# Patient Record
Sex: MALE | Race: WHITE | Employment: OTHER | URBAN - METROPOLITAN AREA
[De-identification: names, ages, dates, MRNs, and addresses within clinical notes are randomized per-mention and may not be internally consistent; named-entity substitution may affect disease eponyms.]

---

## 2017-05-02 ENCOUNTER — GENERIC CONVERSION - ENCOUNTER (OUTPATIENT)
Dept: OTHER | Facility: OTHER | Age: 67
End: 2017-05-02

## 2017-05-02 LAB
A/G RATIO (HISTORICAL): 2.1 (ref 1.2–2.2)
ALBUMIN SERPL BCP-MCNC: 4.4 G/DL (ref 3.6–4.8)
ALP SERPL-CCNC: 45 IU/L (ref 39–117)
ALT SERPL W P-5'-P-CCNC: 22 IU/L (ref 0–44)
AST SERPL W P-5'-P-CCNC: 24 IU/L (ref 0–40)
BASOPHILS # BLD AUTO: 0.1 X10E3/UL (ref 0–0.2)
BASOPHILS # BLD AUTO: 1 %
BILIRUB SERPL-MCNC: 0.8 MG/DL (ref 0–1.2)
BUN SERPL-MCNC: 18 MG/DL (ref 8–27)
BUN/CREA RATIO (HISTORICAL): 16 (ref 10–24)
CALCIUM SERPL-MCNC: 9.2 MG/DL (ref 8.6–10.2)
CHLORIDE SERPL-SCNC: 100 MMOL/L (ref 96–106)
CHOLEST SERPL-MCNC: 153 MG/DL (ref 100–199)
CO2 SERPL-SCNC: 22 MMOL/L (ref 18–29)
CREAT SERPL-MCNC: 1.11 MG/DL (ref 0.76–1.27)
DEPRECATED RDW RBC AUTO: 14.1 % (ref 12.3–15.4)
EGFR AFRICAN AMERICAN (HISTORICAL): 79 ML/MIN/1.73
EGFR-AMERICAN CALC (HISTORICAL): 68 ML/MIN/1.73
EOSINOPHIL # BLD AUTO: 0.8 X10E3/UL (ref 0–0.4)
EOSINOPHIL # BLD AUTO: 13 %
GLUCOSE SERPL-MCNC: 108 MG/DL (ref 65–99)
HBA1C MFR BLD HPLC: 6.1 % (ref 4.8–5.6)
HCT VFR BLD AUTO: 40.4 % (ref 37.5–51)
HDLC SERPL-MCNC: 53 MG/DL
HGB BLD-MCNC: 14.2 G/DL (ref 12.6–17.7)
IMM.GRANULOCYTES (CD4/8) (HISTORICAL): 0 %
IMM.GRANULOCYTES (CD4/8) (HISTORICAL): 0 X10E3/UL (ref 0–0.1)
LDLC SERPL CALC-MCNC: 78 MG/DL (ref 0–99)
LYMPHOCYTES # BLD AUTO: 2.1 X10E3/UL (ref 0.7–3.1)
LYMPHOCYTES # BLD AUTO: 33 %
MCH RBC QN AUTO: 29.1 PG (ref 26.6–33)
MCHC RBC AUTO-ENTMCNC: 35.1 G/DL (ref 31.5–35.7)
MCV RBC AUTO: 83 FL (ref 79–97)
MONOCYTES # BLD AUTO: 0.5 X10E3/UL (ref 0.1–0.9)
MONOCYTES (HISTORICAL): 8 %
NEUTROPHILS # BLD AUTO: 2.9 X10E3/UL (ref 1.4–7)
NEUTROPHILS # BLD AUTO: 45 %
PLATELET # BLD AUTO: 231 X10E3/UL (ref 150–379)
POTASSIUM SERPL-SCNC: 4.3 MMOL/L (ref 3.5–5.2)
PROSTATE SPECIFIC ANTIGEN (HISTORICAL): 1 NG/ML (ref 0–4)
RBC (HISTORICAL): 4.88 X10E6/UL (ref 4.14–5.8)
SODIUM SERPL-SCNC: 139 MMOL/L (ref 134–144)
TOT. GLOBULIN, SERUM (HISTORICAL): 2.1 G/DL (ref 1.5–4.5)
TOTAL PROTEIN (HISTORICAL): 6.5 G/DL (ref 6–8.5)
TRIGL SERPL-MCNC: 111 MG/DL (ref 0–149)
WBC # BLD AUTO: 6.3 X10E3/UL (ref 3.4–10.8)

## 2017-05-03 LAB — INTERPRETATION (HISTORICAL): NORMAL

## 2017-05-10 ENCOUNTER — ALLSCRIPTS OFFICE VISIT (OUTPATIENT)
Dept: OTHER | Facility: OTHER | Age: 67
End: 2017-05-10

## 2017-05-15 ENCOUNTER — GENERIC CONVERSION - ENCOUNTER (OUTPATIENT)
Dept: OTHER | Facility: OTHER | Age: 67
End: 2017-05-15

## 2017-11-13 ENCOUNTER — GENERIC CONVERSION - ENCOUNTER (OUTPATIENT)
Dept: OTHER | Facility: OTHER | Age: 67
End: 2017-11-13

## 2017-11-13 LAB
A/G RATIO (HISTORICAL): 2.1 (ref 1.2–2.2)
ALBUMIN SERPL BCP-MCNC: 4.2 G/DL (ref 3.6–4.8)
ALP SERPL-CCNC: 40 IU/L (ref 39–117)
ALT SERPL W P-5'-P-CCNC: 23 IU/L (ref 0–44)
AST SERPL W P-5'-P-CCNC: 21 IU/L (ref 0–40)
BILIRUB SERPL-MCNC: 0.6 MG/DL (ref 0–1.2)
BUN SERPL-MCNC: 15 MG/DL (ref 8–27)
BUN/CREA RATIO (HISTORICAL): 13 (ref 10–24)
CALCIUM SERPL-MCNC: 8.9 MG/DL (ref 8.6–10.2)
CHLORIDE SERPL-SCNC: 99 MMOL/L (ref 96–106)
CO2 SERPL-SCNC: 25 MMOL/L (ref 18–29)
CREAT SERPL-MCNC: 1.14 MG/DL (ref 0.76–1.27)
EGFR AFRICAN AMERICAN (HISTORICAL): 77 ML/MIN/1.73
EGFR-AMERICAN CALC (HISTORICAL): 66 ML/MIN/1.73
GLUCOSE SERPL-MCNC: 107 MG/DL (ref 65–99)
HBA1C MFR BLD HPLC: 6.1 % (ref 4.8–5.6)
POTASSIUM SERPL-SCNC: 3.9 MMOL/L (ref 3.5–5.2)
SODIUM SERPL-SCNC: 140 MMOL/L (ref 134–144)
TOT. GLOBULIN, SERUM (HISTORICAL): 2 G/DL (ref 1.5–4.5)
TOTAL PROTEIN (HISTORICAL): 6.2 G/DL (ref 6–8.5)

## 2017-11-15 ENCOUNTER — GENERIC CONVERSION - ENCOUNTER (OUTPATIENT)
Dept: OTHER | Facility: OTHER | Age: 67
End: 2017-11-15

## 2017-11-20 ENCOUNTER — ALLSCRIPTS OFFICE VISIT (OUTPATIENT)
Dept: OTHER | Facility: OTHER | Age: 67
End: 2017-11-20

## 2017-11-21 NOTE — PROGRESS NOTES
Assessment    1  Benign essential hypertension (401 1) (I10)   2  CAD (coronary atherosclerotic disease) (414 00) (I25 10)   3  Hyperlipidemia LDL goal <100 (272 4) (E78 5)   4  IFG (impaired fasting glucose) (790 21) (R73 01)    Plan  Benign essential hypertension    · AmLODIPine Besylate 5 MG Oral Tablet; Take 1 tablet daily   · Metoprolol Tartrate 50 MG Oral Tablet; 1 two times a day  Benign essential hypertension, CAD (coronary atherosclerotic disease), HyperlipidemiaLDL goal <100, IFG (impaired fasting glucose)    · (1) COMPREHENSIVE METABOLIC PANEL; Status:Active; Requested for:20Nov2017;    · (1) HEMOGLOBIN A1C; Status:Active; Requested for:20Nov2017;   Benign essential hypertension, CAD (coronary atherosclerotic disease), HyperlipidemiaLDL goal <100, IFG (impaired fasting glucose), Prostate cancerscreening    · (1) PSA (SCREEN) (Dx V76 44 Screen for Prostate Cancer); Status:Active; Requestedfor:20Nov2017;   CAD (coronary atherosclerotic disease)    · Aspir-81 81 MG Oral Tablet Delayed Release; 1 every day   · Clopidogrel Bisulfate 75 MG Oral Tablet (Plavix); 1 every day   · Nitrostat 0 4 MG Sublingual Tablet Sublingual (Nitroglycerin); PLACE 1 TABLETUNDER THE TONGUE EVERY 5 MINUTES UP TO 3 DOSES AS NEEDEDFOR CHEST PAIN  Hyperlipidemia LDL goal <100    · Crestor 20 MG Oral Tablet (Rosuvastatin Calcium); ONE TAB DAILY  Screening for genitourinary condition    · *VB - Urinary Incontinence Screen (Dx Z13 89 Screen for UI); Status:Resulted - RequiresVerification,Retrospective By Protocol Authorization;   Done: 60GNO2825 12:00AM  Unlinked    · Doxycycline Hyclate 20 MG Oral Tablet    Discussion/Summary  Possible side effects of new medications were reviewed with the patient/guardian today  The treatment plan was reviewed with the patient/guardian  The patient/guardian understands and agrees with the treatment plan      Chief Complaint  Seen for a f/u on labs  er/cma        History of Present Illness  prediabetesand prevention advisedadvisedloss is recommended  The patient states his hyperlipidemia has been stable since the last visit  Comorbid Illnesses: coronary artery disease  Symptoms: denies muscle pain  The patient presents for follow-up of essential hypertension  The patient states he has been doing well with his blood pressure control since the last visit  Comorbid Illnesses: coronary artery disease  Symptoms: denies lower extremity edema  Review of Systems   Cardiovascular: no chest pain  Neurological: no dizziness  Preventive Quality 65 and Older: The patient currently has no urinary incontinence symptoms  Active Problems    1  Benign colon polyp (211 3) (K63 5)   2  Benign essential hypertension (401 1) (I10)   3  BMI 26 0-26 9,adult (V85 22) (Z68 26)   4  CAD (coronary atherosclerotic disease) (414 00) (I25 10)   5  Colon cancer screening (V76 51) (Z12 11)   6  Hemorrhoids (455 6) (K64 9)   7  Hyperlipidemia LDL goal <100 (272 4) (E78 5)   8  IFG (impaired fasting glucose) (790 21) (R73 01)   9  Immunization due (V05 9) (Z23)   10  Organic impotence (607 84) (N52 9)   11  Overweight (278 02) (E66 3)   12  Prostate cancer screening (V76 44) (Z12 5)   13  Screening for cardiovascular, respiratory, and genitourinary diseases  (V81 2,V81 4,V81 6) (Z13 6,Z13 83,Z13 89)   14  Screening for diabetes mellitus (DM) (V77 1) (Z13 1)    Past Medical History  1  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)   2  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)   3  History of chest pain (V13 89) (Z87 898)   4  History of dermatitis (V13 3) (Z87 2)   5  History of diarrhea (V12 79) (Z87 898)   6  History of Myalgia And Myositis (729 1)   7  History of Noninfectious Dermatological Conditions (709 9)   8  History of Skin neoplasm (239 2) (D49 2)    Family History  Father    1   Family history of Coronary arteriosclerosis    The family history was reviewed and updated today  Social History     · Former smoker (C35 25) (X85 108)  The social history was reviewed and updated today  Current Meds   1  AmLODIPine Besylate 5 MG Oral Tablet; Take 1 tablet daily; Therapy: (Recorded:20Nov2017) to Recorded   2  Aspir-81 81 MG Oral Tablet Delayed Release; 1 every day; Therapy: 90OXR0824 to (Last Rx:10Jun2013)  Requested for: 17QYL7878 Ordered   3  Cialis 20 MG Oral Tablet; use as directed; Therapy: 42OSY3237 to (Last Richard Lias)  Requested for: 28Oct2016 Ordered   4  Clopidogrel Bisulfate 75 MG Oral Tablet; 1 every day; Therapy: 82RUS0253 to (Last Rx:10Jun2013)  Requested for: 14GWT0893 Ordered   5  Crestor 20 MG Oral Tablet; ONE TAB DAILY; Therapy: 47DRV2865 to (Last Rx:10Jun2013)  Requested for: 67EKJ4461 Ordered   6  Doxycycline Hyclate 20 MG Oral Tablet; take 1 tablet twice daily Recorded   7  Metoprolol Tartrate 50 MG Oral Tablet; 1 two times a day; Therapy: 13IDW0664 to (Last Rx:10Jun2013)  Requested for: 28GER9059 Ordered   8  Nitrostat 0 4 MG Sublingual Tablet Sublingual; PLACE 1 TABLET UNDER THE TONGUE EVERY 5 MINUTES UP TO 3 DOSES AS NEEDED FOR CHEST PAIN; Therapy: 55PLJ6161 to (Evaluate:23Oct2013); Last Rx:77Ens7095 Ordered    Allergies  1  No Known Drug Allergies    Vitals  Vital Signs    Recorded: 20Nov2017 10:00AM   Temperature 96 3 F   Heart Rate 76   Respiration 20   Systolic 402   Diastolic 80   Height 5 ft 10 in   Weight 178 lb    BMI Calculated 25 54   BSA Calculated 1 99       Physical Exam   Constitutional  General appearance: No acute distress, well appearing and well nourished  Eyes  Conjunctiva and lids: No swelling, erythema, or discharge  Pupils and irises: Equal, round and reactive to light  Ears, Nose, Mouth, and Throat  External inspection of ears and nose: Normal    Otoscopic examination: Tympanic membrance translucent with normal light reflex  Canals patent without erythema     Nasal mucosa, septum, and turbinates: Normal without edema or erythema  Oropharynx: Normal with no erythema, edema, exudate or lesions  Pulmonary  Respiratory effort: No increased work of breathing or signs of respiratory distress  Auscultation of lungs: Clear to auscultation, equal breath sounds bilaterally, no wheezes, no rales, no rhonci  Cardiovascular  Auscultation of heart: Normal rate and rhythm, normal S1 and S2, without murmurs  Examination of extremities for edema and/or varicosities: Normal    Carotid pulses: Normal    Abdomen  Abdomen: Non-tender, no masses  Lymphatic  Palpation of lymph nodes in neck: No lymphadenopathy  Musculoskeletal  Gait and station: Normal    Digits and nails: Normal without clubbing or cyanosis  Skin  Skin and subcutaneous tissue: Normal without rashes or lesions  Psychiatric  Mood and affect: Normal          Results/Data  *VB - Urinary Incontinence Screen (Dx Z13 89 Screen for UI) 07IJA2067 12:00AM Julisa Bismarck     Test Name Result Flag Reference   Urinary Incontinence Assessment 72NLK1510                       Provider Comments    htn/cad/chol stableunchangedf/uof statins for purposes of CVD/CVA risk reduction was advised according to USPSTF guidelines, along with appropriate continued liver monitoring        Signatures   Electronically signed by : Jaiden Pink DO; Nov 20 2017 12:49PM EST                       (Author)

## 2018-01-11 NOTE — PROGRESS NOTES
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)   6  Prediabetes (790 29) (R73 09)    Plan  Benign essential hypertension, CAD (coronary atherosclerotic disease), Hyperlipidemia  LDL goal <100, Prediabetes    · (LC) CMP14+eGFR; Status:Active; Requested QUINN:30JLH2595;    · (LC) Hemoglobin A1c; Status:Active; Requested KWK:91WZU7266;    · ([facility]) Lipid Panel; Status:Active; Requested KRYSTAL:50PGD8714;   Hyperlipidemia LDL goal <100    · Begin a limited exercise program ; Status:Complete;   Done: 05IZL9402   · Eat a low fat and low cholesterol diet ; Status:Complete;   Done: 48GFZ6630   · Shared Decision Making Aid given; Status:Complete;   Done: 64OTC0812    Discussion/Summary    ALVIN  htn/cad/chol/ed/prediabetes stable  q6m f/u  The patient, patient's family was counseled regarding risk factor reductions, impressions, risks and benefits of treatment options  Chief Complaint  pt present to follow up on medications  hg      History of Present Illness  CAD/htn/chol stable  ED stable, not very active  sees cardiologist  Dr Diego Aggarwal  prediabetes unchanged  chol stable  psa normal      Review of Systems    Constitutional: no fever and no chills  Cardiovascular: no chest pain  Respiratory: no shortness of breath  Neurological: no dizziness and no fainting  Preventive Quality 65 and Older: The patient currently has no urinary incontinence symptoms  Active Problems    1  Benign colon polyp (211 3) (K63 5)   2  Benign essential hypertension (401 1) (I10)   3  CAD (coronary atherosclerotic disease) (414 00) (I25 10)   4  Colon cancer screening (V76 51) (Z12 11)   5  Dermatitis (692 9) (L30 9)   6  Hemorrhoids (455 6) (K64 9)   7   Hyperlipidemia LDL goal <100 (272 4) (E78 5)   8  Immunization due (V05 9) (Z23)   9  Myalgia And Myositis (729 1)   10  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)   11  Prediabetes (790 29) (R73 09)   12  Prostate cancer screening (V76 44) (Z12 5)   13  Screening for diabetes mellitus (DM) (V77 1) (Z13 1)   14  Skin neoplasm (239 2) (D49 2)    Past Medical History    1  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)   2  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)   3  History of chest pain (V13 89) (Z87 898)   4  History of diarrhea (V12 79) (Z87 898)   5  History of Noninfectious Dermatological Conditions (709 9)    Family History    1  Family history of Coronary arteriosclerosis    The family history was reviewed and updated today  Social History    · Former smoker (N04 50) (W13 358)  The social history was reviewed and is unchanged  Current Meds   1  AmLODIPine Besylate 5 MG Oral Tablet; Take 1 tablet daily; Therapy: (Blanquita Ball) to Recorded   2  Aspir-81 81 MG Oral Tablet Delayed Release; 1 every day; Therapy: 02GQY7696 to (Last Rx:10Jun2013)  Requested for: 13VPT6105 Ordered   3  Cialis 20 MG Oral Tablet; use as directed; Therapy: 14BMH0851 to (Last Rx:92Kqu5873)  Requested for: 83Svo3707 Ordered   4  Clopidogrel Bisulfate 75 MG Oral Tablet; 1 every day; Therapy: 18LCJ0661 to (Last Rx:10Jun2013)  Requested for: 03Obi4788 Ordered   5  Crestor 20 MG Oral Tablet; ONE TAB DAILY; Therapy: 11NWP5308 to (Last Rx:10Jun2013)  Requested for: 88WFE5242 Ordered   6  Doxycycline Hyclate 20 MG Oral Tablet; take 1 tablet twice daily Recorded   7  Metoprolol Tartrate 50 MG Oral Tablet; 1 two times a day; Therapy: 99ZFZ9800 to (Last Rx:10Jun2013)  Requested for: 06Nrt7613 Ordered   8  Nitrostat 0 4 MG Sublingual Tablet Sublingual; PLACE 1 TABLET UNDER THE TONGUE   EVERY 5 MINUTES UP TO 3 DOSES AS NEEDED FOR CHEST PAIN;   Therapy: 89SYY4271 to (Evaluate:23Oct2013);  Last Rx:36Ksk8711 Ordered    Allergies    1  No Known Drug Allergies    Vitals  Vital Signs [Data Includes: Current Encounter]    Recorded: 16ASR5668 02:10PM   Temperature 98 4 F   Heart Rate 68   Respiration 18   Systolic 152   Diastolic 70   Height 5 ft 10 in   Weight 182 lb    BMI Calculated 26 11   BSA Calculated 2 01     Physical Exam    Constitutional   General appearance: No acute distress, well appearing and well nourished  Eyes   Conjunctiva and lids: No swelling, erythema, or discharge  Pupils and irises: Equal, round and reactive to light  Ears, Nose, Mouth, and Throat   External inspection of ears and nose: Normal     Otoscopic examination: Tympanic membrance translucent with normal light reflex  Canals patent without erythema  Nasal mucosa, septum, and turbinates: Normal without edema or erythema  Oropharynx: Normal with no erythema, edema, exudate or lesions  Pulmonary   Respiratory effort: No increased work of breathing or signs of respiratory distress  Auscultation of lungs: Clear to auscultation, equal breath sounds bilaterally, no wheezes, no rales, no rhonci  Cardiovascular   Palpation of heart: Normal PMI, no thrills  Auscultation of heart: Normal rate and rhythm, normal S1 and S2, without murmurs  Examination of extremities for edema and/or varicosities: Normal     Abdomen   Abdomen: Non-tender, no masses  Lymphatic   Palpation of lymph nodes in neck: No lymphadenopathy  Musculoskeletal   Gait and station: Normal     Digits and nails: Normal without clubbing or cyanosis  Inspection/palpation of joints, bones, and muscles: Normal     Skin   Skin and subcutaneous tissue: Normal without rashes or lesions      Psychiatric   Mood and affect: Normal          Signatures   Electronically signed by : Stefano Boykin DO; Jan 27 2016 10:46PM EST                       (Author)

## 2018-01-11 NOTE — RESULT NOTES
Verified Results  (1) CBC/PLT/DIFF 95XHY8121 07:38AM Juluis Hair     Test Name Result Flag Reference   WBC 6 3 x10E3/uL  3 4-10 8   RBC 4 88 x10E6/uL  4 14-5 80   Hemoglobin 14 2 g/dL  12 6-17 7   Hematocrit 40 4 %  37 5-51 0   MCV 83 fL  79-97   MCH 29 1 pg  26 6-33 0   MCHC 35 1 g/dL  31 5-35 7   RDW 14 1 %  12 3-15 4   Platelets 785 E23L6/ZJ  150-379   Neutrophils 45 %     Lymphs 33 %     Monocytes 8 %     Eos 13 %     Basos 1 %     Neutrophils (Absolute) 2 9 x10E3/uL  1 4-7 0   Lymphs (Absolute) 2 1 x10E3/uL  0 7-3 1   Monocytes(Absolute) 0 5 x10E3/uL  0 1-0 9   Eos (Absolute) 0 8 x10E3/uL H 0 0-0 4   Baso (Absolute) 0 1 x10E3/uL  0 0-0 2   Immature Granulocytes 0 %     Immature Grans (Abs) 0 0 x10E3/uL  0 0-0 1

## 2018-01-12 VITALS
HEIGHT: 70 IN | SYSTOLIC BLOOD PRESSURE: 130 MMHG | DIASTOLIC BLOOD PRESSURE: 80 MMHG | BODY MASS INDEX: 25.48 KG/M2 | WEIGHT: 178 LBS | TEMPERATURE: 96.3 F | HEART RATE: 76 BPM | RESPIRATION RATE: 20 BRPM

## 2018-01-12 NOTE — PROGRESS NOTES
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)   6  Prediabetes (790 29) (R73 09)    Plan  Benign essential hypertension, CAD (coronary atherosclerotic disease), Hyperlipidemia  LDL goal <100, Prediabetes    · (LC) CMP14+eGFR; Status:Active; Requested YOK:95NFW6003;    · (LC) Hemoglobin A1c; Status:Active; Requested RNB:16YXZ6695;    · ([facility]) Lipid Panel; Status:Active; Requested [id number];   Hyperlipidemia LDL goal <100    · Begin a limited exercise program ; Status:Complete;   Done: 73YRQ8803   · Eat a low fat and low cholesterol diet ; Status:Complete;   Done: 63UCT6047   · Shared Decision Making Aid given; Status:Complete;   Done: 05BBW1804    Discussion/Summary  Impression: Initial Annual Wellness Visit, with preventive exam as well as age and risk appropriate counseling completed, given  Cardiovascular screening and counseling: the risks and benefits of screening were discussed and screening is current  Diabetes screening and counseling: the risks and benefits of screening were discussed and screening is current  Prostate cancer screening and counseling: the risks and benefits of screening were discussed and screening is current  Immunizations: the patient declines the influenza vaccination and the patient declines the pneumococcal vaccination  Advance Directive Planning: paperwork and instructions were given to the patient  Patient Discussion: plan discussed with the patient, plan discussed with the patient's family  History of Present Illness  The patient is being seen for the initial annual wellness visit  Medicare Screening and Risk Factors   Hospitalizations: he has been previously hospitalizied and cardiac stent     Medicare Screening Tests Risk Questions   Abdominal aortic aneurysm risk assessment: none indicated  Osteoporosis risk assessment: none indicated  HIV risk assessment: none indicated  Drug and Alcohol Use: The patient is a former cigarette smoker and quit 1984  The patient reports occasional alcohol use  He has never used illicit drugs  Diet and Physical Activity: Current diet includes low fat food choices and low salt food choices  He walks dog  The patient does not exercise  Mood Disorder and Cognitive Impairment Screening: PHQ-9 Depression Scale   Over the past 2 weeks, how often have you been bothered by the following problems? 1 ) Little interest or pleasure in doing things? Not at all    2 ) Feeling down, depressed or hopeless? Not at all  He denies feeling down, depressed, or hopeless over the past two weeks  He denies feeling little interest or pleasure in doing things over the past two weeks  Functional Ability/Level of Safety: Hearing is a hearing aid is not used  He denies hearing difficulties  The patient is currently able to do activities of daily living without limitations and able to do instrumental activities of daily living without limitations  Activities of daily living details: no meal preparation help needed, does not need help doing housework, does not need help managing medications and does not need help managing money  Advance Directives: Advance directives: no living will  Co-Managers and Medical Equipment/Suppliers: See Patient Care Team      Patient Care Team    Care Team Member Role Specialty Office Number   Carson Toledo, [address] Referring Family Medicine [phone]   Enzo Perez MD Specialist Cardiology [phone]    Specialist  [phone]    Specialist  [phone]     Active Problems    1  Benign colon polyp (211 3) (K63 5)   2  Benign essential hypertension (401 1) (I10)   3  CAD (coronary atherosclerotic disease) (414 00) (I25 10)   4   Colon cancer screening (V74 51) (Z12 11)   5  Dermatitis (692 9) (L30 9)   6  Hemorrhoids (455 6) (K64 9)   7  Hyperlipidemia LDL goal <100 (272 4) (E78 5)   8  Immunization due (V05 9) (Z23)   9  Myalgia And Myositis (729 1)   10  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)   11  Prediabetes (790 29) (R73 09)   12  Prostate cancer screening (V76 44) (Z12 5)   13  Screening for diabetes mellitus (DM) (V77 1) (Z13 1)   14  Screening for genitourinary condition (V81 6) (Z13 89)   15  Skin neoplasm (239 2) (D49 2)    Past Medical History    1  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)   2  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)   3  History of chest pain (V13 89) (Z87 898)   4  History of diarrhea (V12 79) (Z87 898)   5  History of Noninfectious Dermatological Conditions (709 9)    Family History    1  Family history of Coronary arteriosclerosis    Social History    · Former smoker (H12 86) (T72 970)    Current Meds   1  AmLODIPine Besylate 5 MG Oral Tablet; Take 1 tablet daily; Therapy: (Javier Chin to Recorded   2  Aspir-81 81 MG Oral Tablet Delayed Release; 1 every day; Therapy: 88DSX5936 to (Last Rx:10Jun2013)  Requested for: 94EVT4941 Ordered   3  Cialis 20 MG Oral Tablet; use as directed; Therapy: 58CRO2440 to (Last Rx:53Qtl9941)  Requested for: 84Wrl4428 Ordered   4  Clopidogrel Bisulfate 75 MG Oral Tablet (Plavix); 1 every day; Therapy: 84KBF7132 to (Last Rx:10Jun2013)  Requested for: 92Pfn0599 Ordered   5  Crestor 20 MG Oral Tablet; ONE TAB DAILY; Therapy: 14ZOR8925 to (Last Rx:10Jun2013)  Requested for: 95HOO4858 Ordered   6  Doxycycline Hyclate 20 MG Oral Tablet; take 1 tablet twice daily Recorded   7  Metoprolol Tartrate 50 MG Oral Tablet; 1 two times a day; Therapy: 35SBR2725 to (Last Rx:48Ywd5241)  Requested for: 91Zen4385 Ordered   8   Nitrostat 0 4 MG Sublingual Tablet Sublingual; PLACE 1 TABLET UNDER THE TONGUE   EVERY 5 MINUTES UP TO 3 DOSES AS NEEDED FOR CHEST PAIN;   Therapy: 45GOI4581 to (Evaluate:23Oct2013); Last Rx:02Wsx3759 Ordered    Allergies    1   No Known Drug Allergies    Vitals  Signs [Data Includes: Current Encounter]   Recorded: 91DRS5815 02:10PM   Temperature: 98 4 F  Heart Rate: 68  Respiration: 18  Systolic: 214  Diastolic: 70  Height: 5 ft 10 in  Weight: 182 lb   BMI Calculated: 26 11  BSA Calculated: 2 01    Results/Data  Encounter Results   *VB-Urinary Incontinence Screen (Dx V81 6 Screen for UI) 28AHZ0814 10:46PM Calin Hem     Test Name Result Flag Reference   Urinary Incontinence Assessment 47VQC6123       DIGITAL RECTAL EXAM 57UEF9363 10:44PM Calin Hem     Test Name Result Flag Reference   DIGITAL RECTAL EXAM [id number]         Signatures   Electronically signed by : Kady Valiente DO; Jan 27 2016 10:51PM EST                       (Author)

## 2018-01-14 VITALS
TEMPERATURE: 97.4 F | SYSTOLIC BLOOD PRESSURE: 122 MMHG | BODY MASS INDEX: 25.34 KG/M2 | HEIGHT: 70 IN | WEIGHT: 177 LBS | RESPIRATION RATE: 16 BRPM | DIASTOLIC BLOOD PRESSURE: 82 MMHG | HEART RATE: 60 BPM

## 2018-01-15 NOTE — RESULT NOTES
Verified Results  ([facility] [facility]) Comp  Metabolic Panel (13) 15DDR6182 09:56AM Phyliss Severs     Test Name Result Flag Reference   Glucose, Serum 107 mg/dL H 65-99   BUN 13 mg/dL  8-27   Creatinine, Serum 1 09 mg/dL  0 76-1 27   eGFR If NonAfricn Am 71 mL/min/1 73  >59   eGFR If Africn Am 82 mL/min/1 73  >59   BUN/Creatinine Ratio 12  10-22   Sodium, Serum 139 mmol/L  134-144   Potassium, Serum 4 6 mmol/L  3 5-5 2   Chloride, Serum 99 mmol/L     Carbon Dioxide, Total 23 mmol/L  18-29   Calcium, Serum 9 2 mg/dL  8 6-10 2   Protein, Total, Serum 6 7 g/dL  6 0-8 5   Albumin, Serum 4 7 g/dL  3 6-4 8   Globulin, Total 2 0 g/dL  1 5-4 5   A/G Ratio 2 4  1 1-2 5   Bilirubin, Total 0 7 mg/dL  0 0-1 2   Alkaline Phosphatase, S 49 IU/L     AST (SGOT) 19 IU/L  0-40     (LC) Lipid Panel 70SYS6912 09:56AM Phyliss Severs     Test Name Result Flag Reference   Cholesterol, Total 192 mg/dL  100-199   Triglycerides 165 mg/dL H 0-149   HDL Cholesterol 63 mg/dL  >39   According to ATP-III Guidelines, HDL-C >59 mg/dL is considered a  negative risk factor for CHD  VLDL Cholesterol Jeremias 33 mg/dL  5-40   LDL Cholesterol Calc 96 mg/dL  0-99     (1) PSA (SCREEN) (Dx V76 44 Screen for Prostate Cancer) 13DMB8043 09:56AM Phyliss Severs     Test Name Result Flag Reference   Prostate Specific Ag, Serum 1 1 ng/mL  0 0-4 0   Roche ECLIA methodology  According to the American Urological Association, Serum PSA should  decrease and remain at undetectable levels after radical  prostatectomy  The AUA defines biochemical recurrence as an initial  PSA value 0 2 ng/mL or greater followed by a subsequent confirmatory  PSA value 0 2 ng/mL or greater  Values obtained with different assay methods or kits cannot be used  interchangeably  Results cannot be interpreted as absolute evidence  of the presence or absence of malignant disease       [facility]) Cardiovascular Risk Assessment 81QIT1640 09:56AM Phyliss Severs     Test Name Result Flag Reference Interpretation Note     Supplement report is available  PDF Image   ([facility]) Hemoglobin A1c 33IHY7176 09:56AM Reji Razo courtesy copy of this report has been sent to  [facility] Specialist      Test Name Result Flag Reference   Hemoglobin A1c 6 4 %Hb     Reference Range:  American Diabetes Association (ADA) Guidelines:  <5 7: Decreased risk for diabetes  5 7 - 6 4: Increased risk for diabetes  >6 4: Ongoing Hyperglycemia of any cause  <7 0: Glycemic control for adults with diabetes   Estimated Average Glucose 137 mg/dL         Discussion/Summary   Please schedule an office appointment to discuss your test results    Dr Carson Toledo

## 2018-01-16 NOTE — RESULT NOTES
Verified Results  ([address] [facility]) Hemoglobin A1c 82Jgq7344 10:48AM Juan Vizcarra     Test Name Result Flag Reference   Hemoglobin A1c 6 3 %Hb     Reference Range:  American Diabetes Association (ADA) Guidelines:  <5 7: Decreased risk for diabetes  5 7 - 6 4: Increased risk for diabetes  >6 4: Ongoing Hyperglycemia of any cause  <7 0: Glycemic control for adults with diabetes   Estimated Average Glucose 134 mg/dL         Discussion/Summary   A1c level indicates risk for developing Diabetes    Dr Jessica Proctor

## 2018-01-16 NOTE — RESULT NOTES
Verified Results  [facility]) CMP14+eGFR [id number] 10:48AM [facility] [place]     Test Name Result Flag Reference   Glucose, Serum 113 mg/dL H 65-99   BUN 12 mg/dL  8-27   Creatinine, Serum 1 02 mg/dL  0 76-1 27   eGFR If NonAfricn Am 76 mL/min/1 73  >59   eGFR If Africn Am 88 mL/min/1 73  >59   BUN/Creatinine Ratio 12  10-22   Sodium, Serum 142 mmol/L  134-144   Potassium, Serum 4 9 mmol/L  3 5-5 2   Chloride, Serum 99 mmol/L     Carbon Dioxide, Total 26 mmol/L  18-29   Calcium, Serum 9 6 mg/dL  8 6-10 2   Protein, Total, Serum 7 0 g/dL  6 0-8 5   Albumin, Serum 4 9 g/dL H 3 6-4 8   Globulin, Total 2 1 g/dL  1 5-4 5   A/G Ratio 2 3  1 1-2 5   Bilirubin, Total 0 5 mg/dL  0 0-1 2   Alkaline Phosphatase, S 53 IU/L     AST (SGOT) 25 IU/L  0-40   ALT (SGPT) 28 IU/L  0-44     (LC) Lipid Panel [id number] 10:48AM [facility] [place]     Test Name Result Flag Reference   Cholesterol, Total 183 mg/dL  100-199   Triglycerides 182 mg/dL H 0-149   HDL Cholesterol 63 mg/dL  >39   According to ATP-III Guidelines, HDL-C >59 mg/dL is considered a  negative risk factor for CHD  VLDL Cholesterol Jeremias 36 mg/dL  5-40   LDL Cholesterol Calc 84 mg/dL  0-99       Discussion/Summary   Please schedule an office appointment    Dr Swann Render

## 2018-01-17 NOTE — RESULT NOTES
Verified Results  (1) COMPREHENSIVE METABOLIC PANEL [id number] 89:57NS [place]     Test Name Result Flag Reference   Glucose, Serum 108 mg/dL H 65-99   BUN 18 mg/dL  8-27   Creatinine, Serum 1 11 mg/dL  0 76-1 27   BUN/Creatinine Ratio 16  10-24   Sodium, Serum 139 mmol/L  134-144   Potassium, Serum 4 3 mmol/L  3 5-5 2   Chloride, Serum 100 mmol/L     Carbon Dioxide, Total 22 mmol/L  18-29   Calcium, Serum 9 2 mg/dL  8 6-10 2   Protein, Total, Serum 6 5 g/dL  6 0-8 5   Albumin, Serum 4 4 g/dL  3 6-4 8   Globulin, Total 2 1 g/dL  1 5-4 5   A/G Ratio 2 1  1 2-2 2   Bilirubin, Total 0 8 mg/dL  0 0-1 2   Alkaline Phosphatase, S 45 IU/L     AST (SGOT) 24 IU/L  0-40   ALT (SGPT) 22 IU/L  0-44   eGFR If NonAfricn Am 68 mL/min/1 73  >59   eGFR If Africn Am 79 mL/min/1 73  >59

## 2018-01-18 NOTE — RESULT NOTES
Discussion/Summary   Please schedule an office appointment     Dr Addison Murry        Verified Results  (1) HEMOGLOBIN A1C [id number] 07:50AM [place]     Test Name Result Flag Reference   Hemoglobin A1c 6 1 % H 4 8-5 6   Pre-diabetes: 5 7 - 6 4           Diabetes: >6 4           Glycemic control for adults with diabetes: <7 0     (1) COMPREHENSIVE METABOLIC PANEL [id number] 48:93VX [place]     Test Name Result Flag Reference   Glucose, Serum 107 mg/dL H 65-99   BUN 15 mg/dL  8-27   Creatinine, Serum 1 14 mg/dL  0 76-1 27   BUN/Creatinine Ratio 13  10-24   Sodium, Serum 140 mmol/L  134-144   Potassium, Serum 3 9 mmol/L  3 5-5 2   Chloride, Serum 99 mmol/L     Carbon Dioxide, Total 25 mmol/L  18-29   Calcium, Serum 8 9 mg/dL  8 6-10 2   Protein, Total, Serum 6 2 g/dL  6 0-8 5   Albumin, Serum 4 2 g/dL  3 6-4 8   Globulin, Total 2 0 g/dL  1 5-4 5   A/G Ratio 2 1  1 2-2 2   Bilirubin, Total 0 6 mg/dL  0 0-1 2   Alkaline Phosphatase, S 40 IU/L     AST (SGOT) 21 IU/L  0-40   ALT (SGPT) 23 IU/L  0-44   eGFR If NonAfricn Am 66 mL/min/1 73  >59   eGFR If Africn Am 77 mL/min/1 73  >59

## 2018-05-19 LAB
ALBUMIN SERPL-MCNC: 4.6 G/DL (ref 3.6–4.8)
ALBUMIN/GLOB SERPL: 2.1 {RATIO} (ref 1.2–2.2)
ALP SERPL-CCNC: 46 IU/L (ref 39–117)
ALT SERPL-CCNC: 21 IU/L (ref 0–44)
AST SERPL-CCNC: 17 IU/L (ref 0–40)
BILIRUB SERPL-MCNC: 0.5 MG/DL (ref 0–1.2)
BUN SERPL-MCNC: 13 MG/DL (ref 8–27)
BUN/CREAT SERPL: 12 (ref 10–24)
CALCIUM SERPL-MCNC: 9.2 MG/DL (ref 8.6–10.2)
CHLORIDE SERPL-SCNC: 99 MMOL/L (ref 96–106)
CO2 SERPL-SCNC: 25 MMOL/L (ref 18–29)
CREAT SERPL-MCNC: 1.08 MG/DL (ref 0.76–1.27)
GLOBULIN SER-MCNC: 2.2 G/DL (ref 1.5–4.5)
GLUCOSE SERPL-MCNC: 114 MG/DL (ref 65–99)
HBA1C MFR BLD: 6.1 % (ref 4.8–5.6)
POTASSIUM SERPL-SCNC: 4.5 MMOL/L (ref 3.5–5.2)
PROT SERPL-MCNC: 6.8 G/DL (ref 6–8.5)
PSA SERPL-MCNC: 1 NG/ML (ref 0–4)
SL AMB EGFR AFRICAN AMERICAN: 81 ML/MIN/1.73
SL AMB EGFR NON AFRICAN AMERICAN: 70 ML/MIN/1.73
SODIUM SERPL-SCNC: 140 MMOL/L (ref 134–144)

## 2018-06-14 ENCOUNTER — OFFICE VISIT (OUTPATIENT)
Dept: FAMILY MEDICINE CLINIC | Facility: CLINIC | Age: 68
End: 2018-06-14
Payer: MEDICARE

## 2018-06-14 VITALS
DIASTOLIC BLOOD PRESSURE: 70 MMHG | TEMPERATURE: 97 F | SYSTOLIC BLOOD PRESSURE: 134 MMHG | RESPIRATION RATE: 16 BRPM | WEIGHT: 175 LBS | HEIGHT: 69 IN | BODY MASS INDEX: 25.92 KG/M2 | HEART RATE: 72 BPM

## 2018-06-14 DIAGNOSIS — I25.10 CORONARY ARTERY DISEASE INVOLVING NATIVE CORONARY ARTERY OF NATIVE HEART WITHOUT ANGINA PECTORIS: ICD-10-CM

## 2018-06-14 DIAGNOSIS — I10 BENIGN ESSENTIAL HYPERTENSION: ICD-10-CM

## 2018-06-14 DIAGNOSIS — N52.9 ORGANIC IMPOTENCE: ICD-10-CM

## 2018-06-14 DIAGNOSIS — Z00.00 MEDICARE ANNUAL WELLNESS VISIT, SUBSEQUENT: Primary | ICD-10-CM

## 2018-06-14 DIAGNOSIS — R73.01 IFG (IMPAIRED FASTING GLUCOSE): ICD-10-CM

## 2018-06-14 PROCEDURE — G0439 PPPS, SUBSEQ VISIT: HCPCS | Performed by: FAMILY MEDICINE

## 2018-06-14 PROCEDURE — 99214 OFFICE O/P EST MOD 30 MIN: CPT | Performed by: FAMILY MEDICINE

## 2018-06-14 RX ORDER — TADALAFIL 20 MG/1
20 TABLET ORAL DAILY PRN
Qty: 12 TABLET | Refills: 5 | Status: SHIPPED | OUTPATIENT
Start: 2018-06-14 | End: 2019-07-09 | Stop reason: SDUPTHER

## 2018-06-14 RX ORDER — ROSUVASTATIN CALCIUM 20 MG/1
20 TABLET, COATED ORAL
COMMUNITY
Start: 2010-12-01 | End: 2021-03-18 | Stop reason: SDUPTHER

## 2018-06-14 RX ORDER — TADALAFIL 20 MG/1
TABLET ORAL
COMMUNITY
Start: 2015-08-05 | End: 2018-06-14 | Stop reason: SDUPTHER

## 2018-06-14 RX ORDER — METOPROLOL TARTRATE 50 MG/1
TABLET, FILM COATED ORAL
COMMUNITY
Start: 2017-10-27 | End: 2021-03-18 | Stop reason: SDUPTHER

## 2018-06-14 RX ORDER — AMLODIPINE BESYLATE 5 MG/1
5 TABLET ORAL
COMMUNITY
Start: 2018-05-07 | End: 2021-03-18 | Stop reason: SDUPTHER

## 2018-06-14 RX ORDER — NITROGLYCERIN 0.4 MG/1
1 TABLET SUBLINGUAL
COMMUNITY
Start: 2013-06-11 | End: 2021-03-18 | Stop reason: SDUPTHER

## 2018-06-14 RX ORDER — CLOPIDOGREL BISULFATE 75 MG/1
75 TABLET ORAL
COMMUNITY
Start: 2017-07-10 | End: 2021-03-18 | Stop reason: SDUPTHER

## 2018-06-14 NOTE — PROGRESS NOTES
Assessment/Plan:    No problem-specific Assessment & Plan notes found for this encounter  Cad/htn/ifg stable  Cont meds  cialis for ED given, risks aware       Diagnoses and all orders for this visit:    Medicare annual wellness visit, subsequent    Benign essential hypertension  -     Comprehensive metabolic panel; Future    Coronary artery disease involving native coronary artery of native heart without angina pectoris  -     Lipid Panel with Direct LDL reflex; Future    IFG (impaired fasting glucose)  -     Hemoglobin A1C; Future    Organic impotence  -     tadalafil (CIALIS) 20 MG tablet; Take 1 tablet (20 mg total) by mouth daily as needed for erectile dysfunction    Other orders  -     amLODIPine (NORVASC) 5 mg tablet;   -     Discontinue: tadalafil (CIALIS) 20 MG tablet; Take by mouth  -     clopidogrel (PLAVIX) 75 mg tablet; Take 75 mg by mouth  -     rosuvastatin (CRESTOR) 20 MG tablet; Take 1 tablet by mouth daily  -     metoprolol tartrate (LOPRESSOR) 50 mg tablet; TAKE 1 TABLET TWICE A DAY  -     nitroglycerin (NITROSTAT) 0 4 mg SL tablet; Place 1 tablet under the tongue every 5 (five) minutes as needed              Return in about 6 months (around 12/14/2018) for Recheck  Subjective:      Patient ID: Sea Weinberg is a 76 y o  male  Chief Complaint   Patient presents with    Follow-up     bloodwork 5/18/2018 Eugenio Alejandro       HPI  Dr Joss Lloyd cardio in [facility]  Due soon  q6m  Last stress test normal  cialis tolerated in past    Low fat/salt  Daily exercise  No cp or COLINDRES    htn stable  ifg aware  Could do more exercise  Does walk dog    The following portions of the patient's history were reviewed and updated as appropriate: allergies, current medications, past family history, past medical history, past social history, past surgical history and problem list     Review of Systems   Cardiovascular: Negative for chest pain  Neurological: Negative for syncope           Current Outpatient Prescriptions Medication Sig Dispense Refill    amLODIPine (NORVASC) 5 mg tablet       clopidogrel (PLAVIX) 75 mg tablet Take 75 mg by mouth      metoprolol tartrate (LOPRESSOR) 50 mg tablet TAKE 1 TABLET TWICE A DAY      nitroglycerin (NITROSTAT) 0 4 mg SL tablet Place 1 tablet under the tongue every 5 (five) minutes as needed      rosuvastatin (CRESTOR) 20 MG tablet Take 1 tablet by mouth daily      tadalafil (CIALIS) 20 MG tablet Take 1 tablet (20 mg total) by mouth daily as needed for erectile dysfunction 12 tablet 5     No current facility-administered medications for this visit  Objective:    /70   Pulse 72   Temp (!) 97 °F (36 1 °C)   Resp 16   Ht 5' 9" (1 753 m)   Wt 79 4 kg (175 lb)   BMI 25 84 kg/m²        Physical Exam   Constitutional: He appears well-developed  HENT:   Head: Normocephalic  Eyes: Conjunctivae are normal    Neck: Neck supple  Cardiovascular: Normal rate and intact distal pulses  Pulmonary/Chest: Effort normal  No respiratory distress  Abdominal: Soft  Musculoskeletal: He exhibits no edema or deformity  Neurological: He is alert  Skin: Skin is warm and dry  Psychiatric: His behavior is normal  Thought content normal    Nursing note and vitals reviewed               Power Ruano DO  Assessment and Plan:    Problem List Items Addressed This Visit     Benign essential hypertension    Relevant Medications    amLODIPine (NORVASC) 5 mg tablet    metoprolol tartrate (LOPRESSOR) 50 mg tablet    Other Relevant Orders    Comprehensive metabolic panel    Coronary artery disease involving native coronary artery of native heart without angina pectoris    Relevant Medications    amLODIPine (NORVASC) 5 mg tablet    clopidogrel (PLAVIX) 75 mg tablet    metoprolol tartrate (LOPRESSOR) 50 mg tablet    nitroglycerin (NITROSTAT) 0 4 mg SL tablet    tadalafil (CIALIS) 20 MG tablet    Other Relevant Orders    Lipid Panel with Direct LDL reflex    IFG (impaired fasting glucose) Relevant Orders    Hemoglobin A1C    Medicare annual wellness visit, subsequent - Primary    Organic impotence    Relevant Medications    tadalafil (CIALIS) 20 MG tablet        Health Maintenance Due   Topic Date Due    Hepatitis C Screening  1950    Depression Screening PHQ-9  1950    CRC Screening: Colonoscopy  1950    DTaP,Tdap,and Td Vaccines (1 - Tdap) 03/11/1971    ABDOMINAL AORTIC ANEURYSM (AAA) SCREEN  03/11/2015    PNEUMOCOCCAL POLYSACCHARIDE VACCINE AGE 72 AND OVER  03/11/2015    GLAUCOMA SCREENING 67+ YR  03/11/2017         HPI:  Christine Teresa is a 76 y o  male here for his Subsequent Wellness Visit  Patient Active Problem List   Diagnosis    Benign essential hypertension    CAD (coronary atherosclerotic disease)    Chronic ischemic heart disease, unspecified    Coronary artery disease involving native coronary artery of native heart without angina pectoris    Hyperlipidemia LDL goal <100    Organic impotence    Antiplatelet or antithrombotic long-term use    Old myocardial infarction    IFG (impaired fasting glucose)    Medicare annual wellness visit, subsequent     Past Medical History:   Diagnosis Date    Chest pain     25nov2008  last assessed    Congenital anomaly of coronary artery     16Jan2009  last assessed    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 [facility])     16jan2009  last assessed    Skin neoplasm     [id number] resolved     No past surgical history on file    Family History   Problem Relation Age of Onset    Coronary artery disease Father      History   Smoking Status    Former Smoker   Smokeless Tobacco    Never Used     History   Alcohol Use    Yes     Comment: occ      History   Drug Use No       Current Outpatient Prescriptions   Medication Sig Dispense Refill    amLODIPine (NORVASC) 5 mg tablet       clopidogrel (PLAVIX) 75 mg tablet Take 75 mg by mouth      metoprolol tartrate (LOPRESSOR) 50 mg tablet TAKE 1 TABLET TWICE A DAY      nitroglycerin (NITROSTAT) 0 4 mg SL tablet Place 1 tablet under the tongue every 5 (five) minutes as needed      rosuvastatin (CRESTOR) 20 MG tablet Take 1 tablet by mouth daily      tadalafil (CIALIS) 20 MG tablet Take 1 tablet (20 mg total) by mouth daily as needed for erectile dysfunction 12 tablet 5     No current facility-administered medications for this visit  Allergies   Allergen Reactions    Lisinopril Cough       There is no immunization history on file for this patient      Patient Care Team:  Autumn Bernal DO as PCP - General  MD Autumn Mixon DO      Medicare Screening Tests and Risk Assessments:  AWV Clinical     ISAR:   Previous hospitalizations?:  No       Once in a Lifetime Medicare Screening:   EKG performed?:  No    AAA screening performed? (if performed, please add date to Health Maintenance):  No       Medicare Screening Tests and Risk Assessment:   AAA Risk Assessment    None Indicated:  Yes    Osteoporosis Risk Assessment    None indicated:  Yes    HIV Risk Assessment    None indicated:  Yes        Drug and Alcohol Use:   Tobacco use    Cigarettes:  former smoker    Quit date:  1/1/1984   Tobacco use duration    Tobacco Cessation Readiness    Alcohol use    Alcohol use:  occasional use    Concern about alcohol use:  No    Alcohol Treatment Readiness   Illicit Drug Use    Drug use:  never        Diet & Exercise:   Diet   What is your diet?:  Low Saturated Fat   How many servings a day of the following:   Exercise    Do you currently exercise?:  yes    Frequency:  daily    Type of exercise:  walking       Cognitive Impairment Screening:   Cognitive Impairment Screening    Do you have difficulty learning or retaining new information?:  No        Functional Ability/Level of Safety:   Hearing    Hearing difficulties:  Yes    Hearing aid:  No    Hearing Impairment Assessment    Current Activities    Status:  unlimited ADL's   Help needed with the folllowing: Transportation:  No   Managing Medications:  No Managing Money:  No   ADL    Fall Risk   Have you fallen in the last 12 months?:  No    Injury History       Home Safety:   Are there hazards in your environment?:  No   Home Safety Risk Factors       Advanced Directives:   Advanced Directives    Living Will:  No Durable POA for healthcare:  No   Patient's End of Life Decisions    Reviewed with patient:  No        Urinary Incontinence:   Do you have urinary incontinence?:  No        Glaucoma:            Provider Screening     Preventative Screening/Counseling:   Cardiovascular Screening/Counseling:   (Labs Q5 years, EKG optional one-time)   General:  Screening Current           Diabetes Screening/Counseling:   (2 tests/year if Pre-Diabetes or 1 test/year if no Diabetes)   General:  Screening Current           Colorectal Cancer Screening/Counseling:   (FOBT Q1 yr; Flex Sig Q4 yrs or Q10 yrs after Screening Colonoscopy; Screening Colonoscpy Q2 yrs High Risk or Q10 yrs Low Risk; Barium Enema Q2 yrs High Risk or Q4 yrs Low Risk)   General:  Risks and Benefits Discussed           Prostate Cancer Screening/Counseling:   (Annual)    General:  Risks and Benefits Discussed          Breast Cancer Screening/Counseling:   (Baseline Age 28 - 43; Annual Age 36+)         Cervical Cancer Screening/Counseling:   (Annual for High Risk or Childbearing Age with Abnormal Pap in Last 3 yrs; Every 2 all others)         Osteoporosis Screening/Counseling:   (Every 2 Yrs if at risk or more if medically necessary)         AAA Screening/Counseling:   (Once per Lifetime with risk factors)          Glaucoma Screening/Counseling:   (Annual)         HIV Screening/Counseling:   (Voluntary;  Once annually for high risk OR 3 times for Pregnancy at diagnosis of IUP; 3rd trimester; and at Labor         Hepatitis C Screening:             Immunizations:   Pneumococcal (Once in a Lifetime):  Patient Declines       Other Preventative Couseling (Non-Medicare Wellness Visit Required):       Referrals (Non-Medicare Wellness Visit Required):       Medical Equipment/Suppliers:

## 2018-09-07 NOTE — PRE-PROCEDURE INSTRUCTIONS
Pre-Surgery Instructions:   Medication Instructions    amLODIPine (NORVASC) 5 mg tablet Patient was instructed by Physician and understands   ASPIRIN 81 PO Patient was instructed by Physician and understands   clopidogrel (PLAVIX) 75 mg tablet Patient was instructed by Physician and understands   metoprolol tartrate (LOPRESSOR) 50 mg tablet Patient was instructed by Physician and understands   nitroglycerin (NITROSTAT) 0 4 mg SL tablet Patient was instructed by Physician and understands   rosuvastatin (CRESTOR) 20 MG tablet Patient was instructed by Physician and understands   tadalafil (CIALIS) 20 MG tablet Patient was instructed by Physician and understands  Pt to follow Dr Deepak Ramírez instructions  Pt is aware of needing a

## 2018-09-10 ENCOUNTER — HOSPITAL ENCOUNTER (OUTPATIENT)
Facility: AMBULARY SURGERY CENTER | Age: 68
Setting detail: OUTPATIENT SURGERY
Discharge: HOME | End: 2018-09-10
Attending: INTERNAL MEDICINE | Admitting: INTERNAL MEDICINE
Payer: MEDICARE

## 2018-09-10 ENCOUNTER — ANESTHESIA (OUTPATIENT)
Dept: GASTROENTEROLOGY | Facility: AMBULARY SURGERY CENTER | Age: 68
End: 2018-09-10
Payer: MEDICARE

## 2018-09-10 VITALS
HEART RATE: 59 BPM | OXYGEN SATURATION: 98 % | TEMPERATURE: 96.9 F | BODY MASS INDEX: 26.66 KG/M2 | WEIGHT: 180 LBS | DIASTOLIC BLOOD PRESSURE: 77 MMHG | SYSTOLIC BLOOD PRESSURE: 166 MMHG | RESPIRATION RATE: 18 BRPM | HEIGHT: 69 IN

## 2018-09-10 DIAGNOSIS — Z86.010 HISTORY OF COLONIC POLYPS: ICD-10-CM

## 2018-09-10 PROCEDURE — 88305 TISSUE EXAM BY PATHOLOGIST: CPT | Performed by: PATHOLOGY

## 2018-09-10 RX ORDER — SODIUM CHLORIDE, SODIUM LACTATE, POTASSIUM CHLORIDE, CALCIUM CHLORIDE 600; 310; 30; 20 MG/100ML; MG/100ML; MG/100ML; MG/100ML
125 INJECTION, SOLUTION INTRAVENOUS CONTINUOUS
Status: DISCONTINUED | OUTPATIENT
Start: 2018-09-10 | End: 2018-09-10 | Stop reason: HOSPADM

## 2018-09-10 RX ORDER — PROPOFOL 10 MG/ML
INJECTION, EMULSION INTRAVENOUS CONTINUOUS PRN
Status: DISCONTINUED | OUTPATIENT
Start: 2018-09-10 | End: 2018-09-10 | Stop reason: SURG

## 2018-09-10 RX ORDER — PROPOFOL 10 MG/ML
INJECTION, EMULSION INTRAVENOUS AS NEEDED
Status: DISCONTINUED | OUTPATIENT
Start: 2018-09-10 | End: 2018-09-10 | Stop reason: SURG

## 2018-09-10 RX ORDER — LIDOCAINE HYDROCHLORIDE 10 MG/ML
INJECTION, SOLUTION INFILTRATION; PERINEURAL AS NEEDED
Status: DISCONTINUED | OUTPATIENT
Start: 2018-09-10 | End: 2018-09-10 | Stop reason: SURG

## 2018-09-10 RX ADMIN — LIDOCAINE HYDROCHLORIDE 50 MG: 10 INJECTION, SOLUTION INFILTRATION; PERINEURAL at 08:42

## 2018-09-10 RX ADMIN — PROPOFOL 125 MCG/KG/MIN: 10 INJECTION, EMULSION INTRAVENOUS at 08:40

## 2018-09-10 RX ADMIN — SODIUM CHLORIDE, SODIUM LACTATE, POTASSIUM CHLORIDE, AND CALCIUM CHLORIDE: .6; .31; .03; .02 INJECTION, SOLUTION INTRAVENOUS at 08:41

## 2018-09-10 RX ADMIN — PROPOFOL 100 MG: 10 INJECTION, EMULSION INTRAVENOUS at 08:40

## 2018-09-10 RX ADMIN — SODIUM CHLORIDE, SODIUM LACTATE, POTASSIUM CHLORIDE, AND CALCIUM CHLORIDE 125 ML/HR: .6; .31; .03; .02 INJECTION, SOLUTION INTRAVENOUS at 08:37

## 2018-09-10 NOTE — ANESTHESIA PREPROCEDURE EVALUATION
Review of Systems/Medical History          Cardiovascular  Hyperlipidemia, Hypertension , Past MI > 6 months, CAD , CAD status: 1VD,    Pulmonary       GI/Hepatic            Endo/Other     GYN       Hematology   Musculoskeletal       Neurology   Psychology           Physical Exam    Airway    Mallampati score: I         Dental       Cardiovascular  Rhythm: regular, Rate: normal,     Pulmonary      Other Findings        Anesthesia Plan  ASA Score- 3     Anesthesia Type- IV sedation with anesthesia with ASA Monitors  Additional Monitors:   Airway Plan:         Plan Factors-    Induction- intravenous  Postoperative Plan-     Informed Consent- Anesthetic plan and risks discussed with patient

## 2018-09-10 NOTE — OP NOTE
COLONOSCOPY  Procedure Note    Arce Nando  9/10/2018    Procedure:  Colonoscopy with snare polypectomy    Pre-op Diagnosis:  History of multiple colon polyps     Post-op Diagnosis: see impression below    Allergies   Allergen Reactions    Lisinopril Cough       Surgeon(s) and Role:     * Jamar Cordero MD - Primary     Bleeding Abnormalities:  None    Indications:  58-year-old gentleman history of multiple hyperplastic polyps last colonoscopy 8 years ago here for colonoscopy  Pre-Procedure Exam:  As per preop consult  Anesthesia:  Mac     Instruments:  Colonoscope    Technique:   ASA class 2 patient was verbally identified less than 100% sensitivity discussed informed consent was obtained which included description of the procedure alternatives risks benefits possible complications including pain bleeding infection perforation arrhythmias and respiratory depression  With the left side down the patient was sedated digital exam was performed scope was advanced to the cecum upon pull out although was evaluated as best as possible  Tolerated procedure well without any immediate complications  Estimated Blood Loss:  Less than 1 cc    Findings:  Good prep scope the cecum appendix opening normal distal terminal ileum normal ascending 5 mm polyp cold snare container 1  Sigmoid diverticulosis 4 mm polyp cold snare container 2    Internal hemorrhoids rectal exam no masses  Impressions:  History of polyps 2 removed today sigmoid diverticulosis internal hemorrhoids  Plan:  Colonoscopy 5 years    CC:  DO Jamar Lozada MD     Date: 9/10/2018  Time: 9:05 AM

## 2018-09-10 NOTE — DISCHARGE INSTRUCTIONS
Two polyps removed repeat colonoscopy 5 years we will call you in 10 days with the results    You have diverticulosis and internal hemorrhoids

## 2018-09-10 NOTE — H&P
Physician Requesting Consult: Jose Tomas DO       Reason for Consult / Principal Problem:  History of polyps of the colon      HPI:  14-year-old gentleman history of multiple hyperplastic polyps last colonoscopy 8 years ago    Allergies: Allergies   Allergen Reactions    Lisinopril Cough       Medications:  Current Facility-Administered Medications:     lactated ringers infusion, 125 mL/hr, Intravenous, Continuous, Harjit Horta DO, Last Rate: 125 mL/hr at 09/10/18 0837, 125 mL/hr at 09/10/18 0837    Past Medical history:  Past Medical History:   Diagnosis Date    Chest pain     25nov2008  last assessed    Congenital anomaly of coronary artery     16Jan2009  last assessed    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 [facility])     16jan2009  last assessed    Fracture     right lower leg    Hypertension     Skin neoplasm     [id number] resolved    Wears glasses        Past Surgical History:   Past Surgical History:   Procedure Laterality Date    ANGIOPLASTY      COLONOSCOPY      CORONARY ANGIOPLASTY WITH STENT PLACEMENT      rca stent    FRACTURE SURGERY Right     tibia,fibula fx repair    TONSILLECTOMY      age 10       Social history:  Reviewed see chart    Review of Systems: Otherwise multiple systems reviewed and/or noncontributory- see chart    Physical Exam:  Vital signs stable afebrile alert oriented x3 regular rate rhythm clear to auscultation abdomen benign    Lab Results: No results found for this or any previous visit (from the past 24 hour(s))  Imaging Studies: No results found      Assessment:  As above    Plan:  Colonoscopy today    Norah Ramirez MD

## 2018-12-17 LAB
ALBUMIN SERPL-MCNC: 4.5 G/DL (ref 3.6–4.8)
ALBUMIN/GLOB SERPL: 2.1 {RATIO} (ref 1.2–2.2)
ALP SERPL-CCNC: 44 IU/L (ref 39–117)
ALT SERPL-CCNC: 21 IU/L (ref 0–44)
AST SERPL-CCNC: 19 IU/L (ref 0–40)
BILIRUB SERPL-MCNC: 0.4 MG/DL (ref 0–1.2)
BUN SERPL-MCNC: 14 MG/DL (ref 8–27)
BUN/CREAT SERPL: 13 (ref 10–24)
CALCIUM SERPL-MCNC: 9.1 MG/DL (ref 8.6–10.2)
CHLORIDE SERPL-SCNC: 99 MMOL/L (ref 96–106)
CHOLEST SERPL-MCNC: 167 MG/DL (ref 100–199)
CO2 SERPL-SCNC: 24 MMOL/L (ref 20–29)
CREAT SERPL-MCNC: 1.1 MG/DL (ref 0.76–1.27)
GLOBULIN SER-MCNC: 2.1 G/DL (ref 1.5–4.5)
GLUCOSE SERPL-MCNC: 115 MG/DL (ref 65–99)
HBA1C MFR BLD: 6.1 % (ref 4.8–5.6)
HDLC SERPL-MCNC: 45 MG/DL
LABCORP COMMENT: NORMAL
LDLC SERPL CALC-MCNC: 95 MG/DL (ref 0–99)
MICRODELETION SYND BLD/T FISH: NORMAL
POTASSIUM SERPL-SCNC: 4.1 MMOL/L (ref 3.5–5.2)
PROT SERPL-MCNC: 6.6 G/DL (ref 6–8.5)
SL AMB EGFR AFRICAN AMERICAN: 79 ML/MIN/1.73
SL AMB EGFR NON AFRICAN AMERICAN: 69 ML/MIN/1.73
SODIUM SERPL-SCNC: 138 MMOL/L (ref 134–144)
TRIGL SERPL-MCNC: 135 MG/DL (ref 0–149)

## 2019-01-08 ENCOUNTER — OFFICE VISIT (OUTPATIENT)
Dept: FAMILY MEDICINE CLINIC | Facility: CLINIC | Age: 69
End: 2019-01-08
Payer: COMMERCIAL

## 2019-01-08 VITALS
HEIGHT: 69 IN | DIASTOLIC BLOOD PRESSURE: 76 MMHG | HEART RATE: 58 BPM | WEIGHT: 178 LBS | RESPIRATION RATE: 16 BRPM | SYSTOLIC BLOOD PRESSURE: 124 MMHG | BODY MASS INDEX: 26.36 KG/M2 | TEMPERATURE: 96.2 F

## 2019-01-08 DIAGNOSIS — I25.10 ATHEROSCLEROSIS OF NATIVE CORONARY ARTERY OF NATIVE HEART WITHOUT ANGINA PECTORIS: Primary | ICD-10-CM

## 2019-01-08 DIAGNOSIS — R73.01 IFG (IMPAIRED FASTING GLUCOSE): ICD-10-CM

## 2019-01-08 DIAGNOSIS — Z12.5 PROSTATE CANCER SCREENING: ICD-10-CM

## 2019-01-08 DIAGNOSIS — Z95.5 STENTED CORONARY ARTERY: ICD-10-CM

## 2019-01-08 DIAGNOSIS — E78.5 HYPERLIPIDEMIA LDL GOAL <100: ICD-10-CM

## 2019-01-08 DIAGNOSIS — I10 BENIGN ESSENTIAL HYPERTENSION: ICD-10-CM

## 2019-01-08 PROCEDURE — 3074F SYST BP LT 130 MM HG: CPT | Performed by: FAMILY MEDICINE

## 2019-01-08 PROCEDURE — 1160F RVW MEDS BY RX/DR IN RCRD: CPT | Performed by: FAMILY MEDICINE

## 2019-01-08 PROCEDURE — 1036F TOBACCO NON-USER: CPT | Performed by: FAMILY MEDICINE

## 2019-01-08 PROCEDURE — 3078F DIAST BP <80 MM HG: CPT | Performed by: FAMILY MEDICINE

## 2019-01-08 PROCEDURE — 3008F BODY MASS INDEX DOCD: CPT | Performed by: FAMILY MEDICINE

## 2019-01-08 PROCEDURE — 99214 OFFICE O/P EST MOD 30 MIN: CPT | Performed by: FAMILY MEDICINE

## 2019-01-08 NOTE — PROGRESS NOTES
Assessment/Plan:    No problem-specific Assessment & Plan notes found for this encounter     htn stable  Chol stable  Use of statins for the purposes of CVD/CVA risks reduction was advised according to USPSTF guidelines along with appropriate continued liver monitoring  No bruising on plavix  ED stable, cialis effective  q6m f/u advised if I am prescribing meds, qyear if gets all from cardiologist, he will check bottles  Declines immunizations  ifg aware  Diet/exercise/weight loss is recommended  Diagnoses and all orders for this visit:    Atherosclerosis of native coronary artery of native heart without angina pectoris    Benign essential hypertension  -     Comprehensive metabolic panel; Future    Stented coronary artery  -     CBC and differential; Future    Hyperlipidemia LDL goal <100    Prostate cancer screening  -     PSA, Total Screen; Future    IFG (impaired fasting glucose)  -     Hemoglobin A1C; Future              Return in about 6 months (around 7/8/2019) for Recheck  Subjective:      Patient ID: Sharri Selby is a 76 y o  male  Chief Complaint   Patient presents with    Follow-up     Review blood work drhlpn       HPI  Cardio in [facility] qyear  Low fat/salt diet  Exercises daily, walking  Last stress test normal  cialis effective, and tolerated, no cp  Tolerating meds  Sees his cardiologist for past 10y in [facility]    The following portions of the patient's history were reviewed and updated as appropriate: allergies, current medications, past family history, past medical history, past social history, past surgical history and problem list     Review of Systems   Respiratory: Negative for shortness of breath  Cardiovascular: Negative for chest pain           Current Outpatient Prescriptions   Medication Sig Dispense Refill    amLODIPine (NORVASC) 5 mg tablet 5 mg daily at bedtime        ASPIRIN 81 PO Take by mouth daily at bedtime      clopidogrel (PLAVIX) 75 mg tablet Take 75 mg by mouth daily at bedtime        metoprolol tartrate (LOPRESSOR) 50 mg tablet TAKE 1 TABLET TWICE A DAY      nitroglycerin (NITROSTAT) 0 4 mg SL tablet Place 1 tablet under the tongue every 5 (five) minutes as needed      rosuvastatin (CRESTOR) 20 MG tablet Take 20 mg by mouth daily at bedtime        tadalafil (CIALIS) 20 MG tablet Take 1 tablet (20 mg total) by mouth daily as needed for erectile dysfunction 12 tablet 5     No current facility-administered medications for this visit  Objective:    /76   Pulse 58   Temp (!) 96 2 °F (35 7 °C)   Resp 16   Ht 5' 9" (1 753 m)   Wt 80 7 kg (178 lb)   BMI 26 29 kg/m²        Physical Exam   Constitutional: He appears well-developed  No distress  HENT:   Head: Normocephalic  Mouth/Throat: No oropharyngeal exudate  Eyes: Conjunctivae are normal  No scleral icterus  Neck: Neck supple  No thyromegaly present  Cardiovascular: Normal rate and intact distal pulses  No murmur heard  Pulmonary/Chest: Effort normal  No respiratory distress  He has no wheezes  Abdominal: Soft  There is no tenderness  There is no rebound  Musculoskeletal: He exhibits no edema or deformity  Lymphadenopathy:     He has no cervical adenopathy  Neurological: He is alert  He exhibits normal muscle tone  Skin: Skin is warm and dry  No rash noted  No pallor  Psychiatric: His behavior is normal  Thought content normal    Nursing note and vitals reviewed               Nolvia Ospina DO

## 2019-05-31 LAB
ALBUMIN SERPL-MCNC: 4.5 G/DL (ref 3.6–4.8)
ALBUMIN/GLOB SERPL: 2.3 {RATIO} (ref 1.2–2.2)
ALP SERPL-CCNC: 60 IU/L (ref 39–117)
ALT SERPL-CCNC: 25 IU/L (ref 0–44)
AST SERPL-CCNC: 34 IU/L (ref 0–40)
BASOPHILS # BLD AUTO: 0.1 X10E3/UL (ref 0–0.2)
BASOPHILS NFR BLD AUTO: 1 %
BILIRUB SERPL-MCNC: 0.8 MG/DL (ref 0–1.2)
BUN SERPL-MCNC: 17 MG/DL (ref 8–27)
BUN/CREAT SERPL: 16 (ref 10–24)
CALCIUM SERPL-MCNC: 9.3 MG/DL (ref 8.6–10.2)
CHLORIDE SERPL-SCNC: 99 MMOL/L (ref 96–106)
CO2 SERPL-SCNC: 23 MMOL/L (ref 20–29)
CREAT SERPL-MCNC: 1.06 MG/DL (ref 0.76–1.27)
EOSINOPHIL # BLD AUTO: 0.8 X10E3/UL (ref 0–0.4)
EOSINOPHIL NFR BLD AUTO: 8 %
ERYTHROCYTE [DISTWIDTH] IN BLOOD BY AUTOMATED COUNT: 14 % (ref 12.3–15.4)
GLOBULIN SER-MCNC: 2 G/DL (ref 1.5–4.5)
GLUCOSE SERPL-MCNC: 112 MG/DL (ref 65–99)
HBA1C MFR BLD: 6.2 % (ref 4.8–5.6)
HCT VFR BLD AUTO: 44.8 % (ref 37.5–51)
HGB BLD-MCNC: 14.8 G/DL (ref 13–17.7)
IMM GRANULOCYTES # BLD: 0 X10E3/UL (ref 0–0.1)
IMM GRANULOCYTES NFR BLD: 0 %
LABCORP COMMENT: NORMAL
LYMPHOCYTES # BLD AUTO: 2.1 X10E3/UL (ref 0.7–3.1)
LYMPHOCYTES NFR BLD AUTO: 21 %
MCH RBC QN AUTO: 28.5 PG (ref 26.6–33)
MCHC RBC AUTO-ENTMCNC: 33 G/DL (ref 31.5–35.7)
MCV RBC AUTO: 86 FL (ref 79–97)
MONOCYTES # BLD AUTO: 0.8 X10E3/UL (ref 0.1–0.9)
MONOCYTES NFR BLD AUTO: 8 %
NEUTROPHILS # BLD AUTO: 6.4 X10E3/UL (ref 1.4–7)
NEUTROPHILS NFR BLD AUTO: 62 %
PLATELET # BLD AUTO: 231 X10E3/UL (ref 150–450)
POTASSIUM SERPL-SCNC: 4.3 MMOL/L (ref 3.5–5.2)
PROT SERPL-MCNC: 6.5 G/DL (ref 6–8.5)
PSA SERPL-MCNC: 1 NG/ML (ref 0–4)
RBC # BLD AUTO: 5.2 X10E6/UL (ref 4.14–5.8)
SL AMB EGFR AFRICAN AMERICAN: 82 ML/MIN/1.73
SL AMB EGFR NON AFRICAN AMERICAN: 71 ML/MIN/1.73
SODIUM SERPL-SCNC: 136 MMOL/L (ref 134–144)
WBC # BLD AUTO: 10.2 X10E3/UL (ref 3.4–10.8)

## 2019-07-09 ENCOUNTER — OFFICE VISIT (OUTPATIENT)
Dept: FAMILY MEDICINE CLINIC | Facility: CLINIC | Age: 69
End: 2019-07-09
Payer: COMMERCIAL

## 2019-07-09 VITALS
BODY MASS INDEX: 26.36 KG/M2 | HEIGHT: 69 IN | HEART RATE: 60 BPM | RESPIRATION RATE: 16 BRPM | WEIGHT: 178 LBS | TEMPERATURE: 98.9 F | DIASTOLIC BLOOD PRESSURE: 70 MMHG | SYSTOLIC BLOOD PRESSURE: 130 MMHG

## 2019-07-09 DIAGNOSIS — R73.01 IFG (IMPAIRED FASTING GLUCOSE): ICD-10-CM

## 2019-07-09 DIAGNOSIS — I25.10 ATHEROSCLEROSIS OF NATIVE CORONARY ARTERY OF NATIVE HEART WITHOUT ANGINA PECTORIS: ICD-10-CM

## 2019-07-09 DIAGNOSIS — Z00.00 MEDICARE ANNUAL WELLNESS VISIT, SUBSEQUENT: Primary | ICD-10-CM

## 2019-07-09 DIAGNOSIS — I10 BENIGN ESSENTIAL HYPERTENSION: ICD-10-CM

## 2019-07-09 DIAGNOSIS — Z87.891 HISTORY OF TOBACCO USE: ICD-10-CM

## 2019-07-09 DIAGNOSIS — N52.9 ORGANIC IMPOTENCE: ICD-10-CM

## 2019-07-09 DIAGNOSIS — L30.9 ECZEMA OF LEFT HAND: ICD-10-CM

## 2019-07-09 DIAGNOSIS — Z12.5 PROSTATE CANCER SCREENING: ICD-10-CM

## 2019-07-09 DIAGNOSIS — E78.5 HYPERLIPIDEMIA LDL GOAL <100: ICD-10-CM

## 2019-07-09 PROCEDURE — 1170F FXNL STATUS ASSESSED: CPT | Performed by: FAMILY MEDICINE

## 2019-07-09 PROCEDURE — 1036F TOBACCO NON-USER: CPT | Performed by: FAMILY MEDICINE

## 2019-07-09 PROCEDURE — 1125F AMNT PAIN NOTED PAIN PRSNT: CPT | Performed by: FAMILY MEDICINE

## 2019-07-09 PROCEDURE — 99214 OFFICE O/P EST MOD 30 MIN: CPT | Performed by: FAMILY MEDICINE

## 2019-07-09 PROCEDURE — G0439 PPPS, SUBSEQ VISIT: HCPCS | Performed by: FAMILY MEDICINE

## 2019-07-09 PROCEDURE — 3075F SYST BP GE 130 - 139MM HG: CPT | Performed by: FAMILY MEDICINE

## 2019-07-09 PROCEDURE — 3008F BODY MASS INDEX DOCD: CPT | Performed by: FAMILY MEDICINE

## 2019-07-09 PROCEDURE — 1160F RVW MEDS BY RX/DR IN RCRD: CPT | Performed by: FAMILY MEDICINE

## 2019-07-09 RX ORDER — TADALAFIL 20 MG/1
20 TABLET ORAL DAILY PRN
Qty: 20 TABLET | Refills: 5 | Status: SHIPPED | OUTPATIENT
Start: 2019-07-09 | End: 2021-03-18 | Stop reason: SDUPTHER

## 2019-07-09 NOTE — PROGRESS NOTES
Assessment/Plan:    No problem-specific Assessment & Plan notes found for this encounter  Aware of ntg-cialis warning  Labs ordered, cc to cardio  tob use hx, LDCT ordered  ED stable, refill given  Eczema hand, skin care advised, cortisone cream prn  bmi aware  BMI Counseling: Body mass index is 26 29 kg/m²  Discussed the patient's BMI with him  The BMI is above average  BMI counseling and education was provided to the patient  Nutrition recommendations include decreasing overall calorie intake  Diagnoses and all orders for this visit:    History of tobacco use  -     CT lung screening program; Future    Benign essential hypertension  -     Comprehensive metabolic panel; Future    Atherosclerosis of native coronary artery of native heart without angina pectoris  -     Lipid Panel with Direct LDL reflex; Future  -     CBC and differential; Future    Hyperlipidemia LDL goal <100    Organic impotence  -     tadalafil (CIALIS) 20 MG tablet; Take 1 tablet (20 mg total) by mouth daily as needed for erectile dysfunction    IFG (impaired fasting glucose)  -     Hemoglobin A1C; Future    Medicare annual wellness visit, subsequent    Prostate cancer screening  -     PSA, Total Screen; Future    Eczema of left hand        Return in about 1 year (around 7/9/2020)  Subjective:      Patient ID: Flip Youngblood is a 71 y o  male  Chief Complaint   Patient presents with    Follow-up     review [id number]       HPI  Sees cardio Dr Gonzalez  Yearly per pt, no labs  Happy with yearly labs from me  Ex smoker, over 1ppd      The following portions of the patient's history were reviewed and updated as appropriate: allergies, current medications, past family history, past medical history, past social history, past surgical history and problem list     Review of Systems   Respiratory: Negative for shortness of breath  Cardiovascular: Negative for chest pain  Gastrointestinal: Negative for bowel incontinence  Psychiatric/Behavioral: The patient is not nervous/anxious  Current Outpatient Medications   Medication Sig Dispense Refill    amLODIPine (NORVASC) 5 mg tablet 5 mg daily at bedtime        ASPIRIN 81 PO Take by mouth daily at bedtime      clopidogrel (PLAVIX) 75 mg tablet Take 75 mg by mouth daily at bedtime        metoprolol tartrate (LOPRESSOR) 50 mg tablet TAKE 1 TABLET TWICE A DAY      nitroglycerin (NITROSTAT) 0 4 mg SL tablet Place 1 tablet under the tongue every 5 (five) minutes as needed      rosuvastatin (CRESTOR) 20 MG tablet Take 20 mg by mouth daily at bedtime        tadalafil (CIALIS) 20 MG tablet Take 1 tablet (20 mg total) by mouth daily as needed for erectile dysfunction 20 tablet 5     No current facility-administered medications for this visit  Objective:    /70   Pulse 60   Temp 98 9 °F (37 2 °C)   Resp 16   Ht 5' 9" (1 753 m)   Wt 80 7 kg (178 lb)   BMI 26 29 kg/m²        Physical Exam   Constitutional: He appears well-developed  No distress  HENT:   Head: Normocephalic  Mouth/Throat: No oropharyngeal exudate  Eyes: Conjunctivae are normal  No scleral icterus  Neck: Neck supple  No JVD present  Cardiovascular: Normal rate, normal heart sounds and intact distal pulses  No murmur heard  Pulmonary/Chest: Effort normal  No respiratory distress  He has no wheezes  He has no rales  Abdominal: Soft  Bowel sounds are normal  He exhibits no distension  No hernia  Genitourinary: Rectum normal, prostate normal and penis normal    Musculoskeletal: He exhibits no edema or deformity  Neurological: He is alert  He exhibits normal muscle tone  Skin: Skin is warm and dry  No pallor  Psychiatric: His behavior is normal  Thought content normal    Nursing note and vitals reviewed               Jazmin Saleh DO   Assessment and Plan:     Problem List Items Addressed This Visit        Active Problems    Benign essential hypertension    Relevant Orders Comprehensive metabolic panel    CAD (coronary atherosclerotic disease)    Relevant Medications    tadalafil (CIALIS) 20 MG tablet    Other Relevant Orders    Lipid Panel with Direct LDL reflex    CBC and differential    History of tobacco use - Primary    Relevant Orders    CT lung screening program    Hyperlipidemia LDL goal <100    IFG (impaired fasting glucose)    Relevant Orders    Hemoglobin A1C    Medicare annual wellness visit, subsequent    Organic impotence    Relevant Medications    tadalafil (CIALIS) 20 MG tablet      Other Visit Diagnoses     Prostate cancer screening        Relevant Orders    PSA, Total Screen    Eczema of left hand             History of Present Illness:     Patient presents for Medicare Annual Wellness visit    Patient Care Team:  Vonda Waller DO as PCP - MD Vonda Tadeo DO Lenny Record, MD as Endoscopist     Problem List:     Patient Active Problem List   Diagnosis    Benign essential hypertension    CAD (coronary atherosclerotic disease)    Chronic ischemic heart disease, unspecified    Coronary artery disease involving native coronary artery of native heart without angina pectoris    Hyperlipidemia LDL goal <100    Organic impotence    Antiplatelet or antithrombotic long-term use    Old myocardial infarction    IFG (impaired fasting glucose)    Medicare annual wellness visit, subsequent    Benign colon polyp    Hemorrhoids    Overweight    Stented coronary artery    History of tobacco use      Past Medical and Surgical History:     Past Medical History:   Diagnosis Date    Chest pain     [id number]  last assessed    Congenital anomaly of coronary artery     16Jan2009  last assessed    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 [facility])     16jan2009  last assessed    Fracture     right lower leg    Hypertension     Skin neoplasm     [id number] resolved    Wears glasses      Past Surgical History:   Procedure Laterality Date    ANGIOPLASTY      COLONOSCOPY      COLONOSCOPY N/A 9/10/2018    Procedure: COLONOSCOPY;  Surgeon: Chepe Gomez MD;  Location: [facility]; Service: Gastroenterology    CORONARY ANGIOPLASTY WITH STENT PLACEMENT      rca stent    FRACTURE SURGERY Right     tibia,fibula fx repair   Elwyn Serge TONSILLECTOMY      age 10      Family History:     Family History   Problem Relation Age of Onset    Coronary artery disease Father     Heart disease Father     Hypertension Father     Macular degeneration Father     Diabetes Sister     Heart disease Brother         CABG age 55,      Social History:     Social History     Tobacco Use   Smoking Status Former Smoker    Last attempt to quit:     Years since quittin 5   Smokeless Tobacco Never Used     Social History     Substance and Sexual Activity   Alcohol Use Yes    Comment: occ     Social History     Substance and Sexual Activity   Drug Use No      Medications and Allergies:     Current Outpatient Medications   Medication Sig Dispense Refill    amLODIPine (NORVASC) 5 mg tablet 5 mg daily at bedtime        ASPIRIN 81 PO Take by mouth daily at bedtime      clopidogrel (PLAVIX) 75 mg tablet Take 75 mg by mouth daily at bedtime        metoprolol tartrate (LOPRESSOR) 50 mg tablet TAKE 1 TABLET TWICE A DAY      nitroglycerin (NITROSTAT) 0 4 mg SL tablet Place 1 tablet under the tongue every 5 (five) minutes as needed      rosuvastatin (CRESTOR) 20 MG tablet Take 20 mg by mouth daily at bedtime        tadalafil (CIALIS) 20 MG tablet Take 1 tablet (20 mg total) by mouth daily as needed for erectile dysfunction 20 tablet 5     No current facility-administered medications for this visit  Allergies   Allergen Reactions    Lisinopril Cough      Immunizations: There is no immunization history on file for this patient  Medicare Screening Tests and Risk Assessments:     Soy is here for his Subsequent Wellness visit    Last Medicare Wellness visit information reviewed, patient interviewed and updates made to the record today  Health Risk Assessment:  Patient rates overall health as excellent  Patient feels that their physical health rating is Much better  Eyesight was rated as Same  Hearing was rated as Same  Patient feels that their emotional and mental health rating is Much better  Pain experienced by patient in the last 7 days has been None  Patient states that he has experienced no weight loss or gain in last 6 months  Emotional/Mental Health:  Patient has not been feeling nervous/anxious  PHQ-9 Depression Screening:    Frequency of the following problems over the past two weeks:      1  Little interest or pleasure in doing things: 0 - not at all      2  Feeling down, depressed, or hopeless: 0 - not at all  PHQ-2 Score: 0          Broken Bones/Falls: Fall Risk Assessment:    In the past year, patient has experienced: No history of falling in past year          Bladder/Bowel:  Patient has not leaked urine accidently in the last six months  Patient reports no loss of bowel control  Immunizations:  Patient has not had a flu vaccination within the last year  Patient has not received a pneumonia shot  Patient has not received a shingles shot  Patient has not received tetanus/diphtheria shot  Home Safety:  Patient does not have trouble with stairs inside or outside of their home  Patient currently reports that there are no safety hazards present in home, working smoke alarms, no working carbon monoxide detectors  Preventative Screenings:   prostate cancer screen performed, colon cancer screen completed, cholesterol screen completed, glaucoma eye exam completed,     Nutrition:  Current diet: Regular, Limited junk food, Low Saturated Fat, Low Carb, No Added Salt and Low Cholesterol with servings of the following:    Medications:  Patient is not currently taking any over-the-counter supplements  Patient is able to manage medications  Lifestyle Choices:  Patient reports no tobacco use  Patient has smoked or used tobacco in the past   Patient has stopped his tobacco use  Tobacco use quit date: 1984  Patient reports alcohol use  Alcohol use per week: 1  Patient drives a vehicle  Patient wears seat belt  Current level of exercise of physical activity described by patient as: walk  Activities of Daily Living:  Can get out of bed by his or her self, able to dress self, able to make own meals, able to do own shopping, able to bathe self, can do own laundry/housekeeping, can manage own money, pay bills and track expenses    Previous Hospitalizations:  No hospitalization or ED visit in past 12 months        Advanced Directives:  Patient has not decided on power of   Patient has not completed advanced directive  Preventative Screening/Counseling:      Cardiovascular:      General: Risks and Benefits Discussed and Screening Current          Diabetes:      General: Screening Current          Colorectal Cancer:      General: Risks and Benefits Discussed          Prostate Cancer:      General: Screening Current          Osteoporosis:      General: Screening Not Indicated          AAA:      General: Screening Not Indicated          Glaucoma:      General: Risks and Benefits Discussed          HIV:      General: Screening Not Indicated          Hepatitis C:      General: Risks and Benefits Discussed        Advanced Directives:   Patient has no living will for healthcare, No end of life assessment reviewed with patient

## 2019-07-10 NOTE — PATIENT INSTRUCTIONS
Obesity   AMBULATORY CARE:   Obesity  is when your body mass index (BMI) is greater than 30  Your healthcare provider will use your height and weight to measure your BMI  The risks of obesity include  many health problems, such as injuries or physical disability  You may need tests to check for the following:  · Diabetes     · High blood pressure or high cholesterol     · Heart disease     · Gallbladder or liver disease     · Cancer of the colon, breast, prostate, liver, or kidney     · Sleep apnea     · Arthritis or gout  Seek care immediately if:   · You have a severe headache, confusion, or difficulty speaking  · You have weakness on one side of your body  · You have chest pain, sweating, or shortness of breath  Contact your healthcare provider if:   · You have symptoms of gallbladder or liver disease, such as pain in your upper abdomen  · You have knee or hip pain and discomfort while walking  · You have symptoms of diabetes, such as intense hunger and thirst, and frequent urination  · You have symptoms of sleep apnea, such as snoring or daytime sleepiness  · You have questions or concerns about your condition or care  Treatment for obesity  focuses on helping you lose weight to improve your health  Even a small decrease in BMI can reduce the risk for many health problems  Your healthcare provider will help you set a weight-loss goal   · Lifestyle changes  are the first step in treating obesity  These include making healthy food choices and getting regular physical activity  Your healthcare provider may suggest a weight-loss program that involves coaching, education, and therapy  · Medicine  may help you lose weight when it is used with a healthy diet and physical activity  · Surgery  can help you lose weight if you are very obese and have other health problems  There are several types of weight-loss surgery  Ask your healthcare provider for more information    Be successful losing weight:   · Set small, realistic goals  An example of a small goal is to walk for 20 minutes 5 days a week  Anther goal is to lose 5% of your body weight  · Tell friends, family members, and coworkers about your goals  and ask for their support  Ask a friend to lose weight with you, or join a weight-loss support group  · Identify foods or triggers that may cause you to overeat , and find ways to avoid them  Remove tempting high-calorie foods from your home and workplace  Place a bowl of fresh fruit on your kitchen counter  If stress causes you to eat, then find other ways to cope with stress  · Keep a diary to track what you eat and drink  Also write down how many minutes of physical activity you do each day  Weigh yourself once a week and record it in your diary  Eating changes: You will need to eat 500 to 1,000 fewer calories each day than you currently eat to lose 1 to 2 pounds a week  The following changes will help you cut calories:  · Eat smaller portions  Use small plates, no larger than 9 inches in diameter  Fill your plate half full of fruits and vegetables  Measure your food using measuring cups until you know what a serving size looks like  · Eat 3 meals and 1 or 2 snacks each day  Plan your meals in advance  [place] and eat at home most of the time  Eat slowly  · Eat fruits and vegetables at every meal   They are low in calories and high in fiber, which makes you feel full  Do not add butter, margarine, or cream sauce to vegetables  Use herbs to season steamed vegetables  · Eat less fat and fewer fried foods  Eat more baked or grilled chicken and fish  These protein sources are lower in calories and fat than red meat  Limit fast food  Dress your salads with olive oil and vinegar instead of bottled dressing  · Limit the amount of sugar you eat  Do not drink sugary beverages  Limit alcohol  Activity changes:  Physical activity is good for your body in many ways   It helps you burn calories and build strong muscles  It decreases stress and depression, and improves your mood  It can also help you sleep better  Talk to your healthcare provider before you begin an exercise program   · Exercise for at least 30 minutes 5 days a week  Start slowly  Set aside time each day for physical activity that you enjoy and that is convenient for you  It is best to do both weight training and an activity that increases your heart rate, such as walking, bicycling, or swimming  · Find ways to be more active  Do yard work and housecleaning  Walk up the stairs instead of using elevators  Spend your leisure time going to events that require walking, such as outdoor festivals or fairs  This extra physical activity can help you lose weight and keep it off  Follow up with your healthcare provider as directed: You may need to meet with a dietitian  Write down your questions so you remember to ask them during your visits  © 2017 [address] [employer] Information is for End User's use only and may not be sold, redistributed or otherwise used for commercial purposes  All illustrations and images included in CareNotes® are the copyrighted property of [employer]  or Charly Guerra  The above information is an  only  It is not intended as medical advice for individual conditions or treatments  Talk to your doctor, nurse or pharmacist before following any medical regimen to see if it is safe and effective for you  Urinary Incontinence   WHAT YOU NEED TO KNOW:   What is urinary incontinence? Urinary incontinence (UI) is when you lose control of your bladder  What causes UI? UI occurs because your bladder cannot store or empty urine properly  The following are the most common types of UI:  · Stress incontinence  is when you leak urine due to increased bladder pressure  This may happen when you cough, sneeze, or exercise       · Urge incontinence  is when you feel the need to urinate right away and leak urine accidentally  · Mixed incontinence  is when you have both stress and urge UI  What are the signs and symptoms of UI?   · You feel like your bladder does not empty completely when you urinate  · You urinate often and need to urinate immediately  · You leak urine when you sleep, or you wake up with the urge to urinate  · You leak urine when you cough, sneeze, exercise, or laugh  How is UI diagnosed? Your healthcare provider will ask how often you leak urine and whether you have stress or urge symptoms  Tell him which medicines you take, how often you urinate, and how much liquid you drink each day  You may need any of the following tests:  · Urine tests  may show infection or kidney function  · A pelvic exam  may be done to check for blockages  A pelvic exam will also show if your bladder, uterus, or other organs have moved out of place  · An x-ray, ultrasound, or CT  may show problems with parts of your urinary system  You may be given contrast liquid to help your organs show up better in the pictures  Tell the healthcare provider if you have ever had an allergic reaction to contrast liquid  Do not enter the MRI room with anything metal  Metal can cause serious injury  Tell the healthcare provider if you have any metal in or on your body  · A bladder scan  will show how much urine is left in your bladder after you urinate  You will be asked to urinate and then healthcare providers will use a small ultrasound machine to check the urine left in your bladder  · Cystometry  is used to check the function of your urinary system  Your healthcare provider checks the pressure in your bladder while filling it with fluid  Your bladder pressure may also be tested when your bladder is full and while you urinate  How is UI treated? · Medicines  can help strengthen your bladder control      · Electrical stimulation  is used to send a small amount of electrical energy to your pelvic floor muscles  This helps control your bladder function  Electrodes may be placed outside your body or in your rectum  For women, the electrodes may be placed in the vagina  · A bulking agent  may be injected into the wall of your urethra to make it thicker  This helps keep your urethra closed and decreases urine leakage  · Devices  such as a clamp, pessary, or tampon may help stop urine leaks  Ask your healthcare provider for more information about these and other devices  · Surgery  may be needed if other treatments do not work  Several types of surgery can help improve your bladder control  Ask your healthcare provider for more information about the surgery you may need  How can I manage my symptoms? · Do pelvic muscle exercises often  Your pelvic muscles help you stop urinating  Squeeze these muscles tight for 5 seconds, then relax for 5 seconds  Gradually work up to squeezing for 10 seconds  Do 3 sets of 15 repetitions a day, or as directed  This will help strengthen your pelvic muscles and improve bladder control  · A catheter  may be used to help empty your bladder  A catheter is a tiny, plastic tube that is put into your bladder to drain your urine  Your healthcare provider may tell you to use a catheter to prevent your bladder from getting too full and leaking urine  · Keep a UI record  Write down how often you leak urine and how much you leak  Make a note of what you were doing when you leaked urine  · Train your bladder  Go to the bathroom at set times, such as every 2 hours, even if you do not feel the urge to go  You can also try to hold your urine when you feel the urge to go  For example, hold your urine for 5 minutes when you feel the urge to go  As that becomes easier, hold your urine for 10 minutes  · Drink liquids as directed  Ask your healthcare provider how much liquid to drink each day and which liquids are best for you   You may need to limit the amount of liquid you drink to help control your urine leakage  Limit or do not have drinks that contain caffeine or alcohol  Do not drink any liquid right before you go to bed  · Prevent constipation  Eat a variety of high-fiber foods  Good examples are high-fiber cereals, beans, vegetables, and whole-grain breads  Prune juice may help make your bowel movement softer  Walking is the best way to trigger your intestines to have a bowel movement  · Exercise regularly and maintain a healthy weight  Ask your healthcare provider how much you should weigh and about the best exercise plan for you  Weight loss and exercise will decrease pressure on your bladder and help you control your leakage  Ask him to help you create a weight loss plan if you are overweight  When should I seek immediate care? · You have severe pain  · You are confused or cannot think clearly  When should I contact my healthcare provider? · You have a fever  · You see blood in your urine  · You have pain when you urinate  · You have new or worse pain, even after treatment  · Your mouth feels dry or you have vision changes  · Your urine is cloudy or smells bad  · You have questions or concerns about your condition or care  CARE AGREEMENT:   You have the right to help plan your care  Learn about your health condition and how it may be treated  Discuss treatment options with your caregivers to decide what care you want to receive  You always have the right to refuse treatment  The above information is an  only  It is not intended as medical advice for individual conditions or treatments  Talk to your doctor, nurse or pharmacist before following any medical regimen to see if it is safe and effective for you  © 2017 [address] [employer] Information is for End User's use only and may not be sold, redistributed or otherwise used for commercial purposes   All illustrations and images included in CareNotes® are the copyrighted property of [employer] M [employer]  or Charly Guerra  Cigarette Smoking and Your Health   AMBULATORY CARE:   Risks to your health if you smoke:  Nicotine and other chemicals found in tobacco damage every cell in your body  Even if you are a light smoker, you have an increased risk for cancer, heart disease, and lung disease  If you are pregnant or have diabetes, smoking increases your risk for complications  Benefits to your health if you stop smoking:   · You decrease respiratory symptoms such as coughing, wheezing, and shortness of breath  · You reduce your risk for cancers of the lung, mouth, throat, kidney, bladder, pancreas, stomach, and cervix  If you already have cancer, you increase the benefits of chemotherapy  You also reduce your risk for cancer returning or a second cancer from developing  · You reduce your risk for heart disease, blood clots, heart attack, and stroke  · You reduce your risk for lung infections, and diseases such as pneumonia, asthma, chronic bronchitis, and emphysema  · Your circulation improves  More oxygen can be delivered to your body  If you have diabetes, you lower your risk for complications, such as kidney, artery, and eye diseases  You also lower your risk for nerve damage  Nerve damage can lead to amputations, poor vision, and blindness  · You improve your body's ability to heal and to fight infections  Benefits to the health of others if you stop smoking:  Tobacco is harmful to nonsmokers who breathe in your secondhand smoke  The following are ways the health of others around you may improve when you stop smoking:  · You lower the risks for lung cancer and heart disease in nonsmoking adults  · If you are pregnant, you lower the risk for miscarriage, early delivery, low birth weight, and stillbirth  You also lower your baby's risk for SIDS, obesity, developmental delay, and neurobehavioral problems, such as ADHD  · If you have children, you lower their risk for ear infections, colds, pneumonia, bronchitis, and asthma  For more information and support to stop smoking:   · Smokefree  gov  Phone: 3- 640 - 244-3561  Web Address: www smokefrTilera  Follow up with your healthcare provider as directed:  Write down your questions so you remember to ask them during your visits  © 2017 [address] [employer] Information is for End User's use only and may not be sold, redistributed or otherwise used for commercial purposes  All illustrations and images included in CareNotes® are the copyrighted property of [employer] M [employer]  or Charly Guerra  The above information is an  only  It is not intended as medical advice for individual conditions or treatments  Talk to your doctor, nurse or pharmacist before following any medical regimen to see if it is safe and effective for you  Fall Prevention   WHAT YOU NEED TO KNOW:   What is fall prevention? Fall prevention includes ways to make your home and other areas safer  It also includes ways you can move more carefully to prevent a fall  What increases my risk for falls? · Lack of vitamin D    · Not getting enough sleep each night    · Trouble walking or keeping your balance, or foot problems    · Health conditions that cause changes in your blood pressure, vision, or muscle strength and coordination    · Medicines that make you dizzy, weak, or sleepy    · Problems seeing clearly    · Shoes that have high heels or are not supportive    · Tripping hazards, such as items left on the floor, no handrails on the stairs, or broken steps  How can I help protect myself from falls? · Stand or sit up slowly  This may help you keep your balance and prevent falls  If you need to get up during the night, sit up first  Be sure you are fully awake before you stand  Turn on the light before you start walking  Go slowly in case you are still sleepy   Make sure you will not trip over any pets sleeping in the bedroom  · Use assistive devices as directed  Your healthcare provider may suggest that you use a cane or walker to help you keep your balance  You may need to have grab bars put in your bathroom near the toilet or in the shower  · Wear shoes that fit well and have soles that   Wear shoes both inside and outside  Use slippers with good   Do not wear shoes with high heels  · Wear a personal alarm  This is a device that allows you to call 911 if you fall and need help  Ask your healthcare provider for more information  · Stay active  Exercise can help strengthen your muscles and improve your balance  Your healthcare provider may recommend water aerobics or walking  He or she may also recommend physical therapy to improve your coordination  Never start an exercise program without talking to your healthcare provider first      · Manage medical conditions  Keep all appointments with your healthcare providers  Visit your eye doctor as directed  How can I make my home safer? · Add items to prevent falls in the bathroom  Put nonslip strips on your bath or shower floor to prevent you from slipping  Use a bath mat if you do not have carpet in the bathroom  This will prevent you from falling when you step out of the bath or shower  Use a shower seat so you do not need to stand while you shower  Sit on the toilet or a chair in your bathroom to dry yourself and put on clothing  This will prevent you from losing your balance from drying or dressing yourself while you are standing  · Keep paths clear  Remove books, shoes, and other objects from walkways and stairs  Place cords for telephones and lamps out of the way so that you do not need to walk over them  Tape them down if you cannot move them  Remove small rugs  If you cannot remove a rug, secure it with double-sided tape  This will prevent you from tripping  · Install bright lights in your home  Use night lights to help light paths to the bathroom or kitchen  Always turn on the light before you start walking  · Keep items you use often on shelves within reach  Do not use a step stool to help you reach an item  · Paint or place reflective tape on the edges of your stairs  This will help you see the stairs better  Call 911 or have someone else call if:   · You have fallen and are unconscious  · You have fallen and cannot move part of your body  Contact your healthcare provider if:   · You have fallen and have pain or a headache  · You have questions or concerns about your condition or care  CARE AGREEMENT:   You have the right to help plan your care  Learn about your health condition and how it may be treated  Discuss treatment options with your caregivers to decide what care you want to receive  You always have the right to refuse treatment  The above information is an  only  It is not intended as medical advice for individual conditions or treatments  Talk to your doctor, nurse or pharmacist before following any medical regimen to see if it is safe and effective for you  © 2017 [address] [facility] Information is for End User's use only and may not be sold, redistributed or otherwise used for commercial purposes  All illustrations and images included in CareNotes® are the copyrighted property of [employer]  or Charly Guerra  Advance Directives   WHAT YOU NEED TO KNOW:   What are advance directives? Advance directives are legal documents that state your wishes and plans for medical care  These plans are made ahead of time in case you lose your ability to make decisions for yourself  Advance directives can apply to any medical decision, such as the treatments you want, and if you want to donate organs  What are the types of advance directives? There are many types of advance directives, and each state has rules about how to use them   You may choose a combination of any of the following:  · Living will: This is a written record of the treatment you want  You can also choose which treatments you do not want, which to limit, and which to stop at a certain time  This includes surgery, medicine, IV fluid, and tube feedings  · Durable power of  for healthcare [place] [facility]): This is a written record that states who you want to make healthcare choices for you when you are unable to make them for yourself  This person, called a proxy, is usually a family member or a friend  You may choose more than 1 proxy  · Do not resuscitate (DNR) order:  A DNR order is used in case your heart stops beating or you stop breathing  It is a request not to have certain forms of treatment, such as CPR  A DNR order may be included in other types of advance directives  · Medical directive: This covers the care that you want if you are in a coma, near death, or unable to make decisions for yourself  You can list the treatments you want for each condition  Treatment may include pain medicine, surgery, blood transfusions, dialysis, IV or tube feedings, and a ventilator (breathing machine)  · Values history: This document has questions about your views, beliefs, and how you feel and think about life  This information can help others choose the care that you would choose  Why are advance directives important? An advance directive helps you control your care  Although spoken wishes may be used, it is better to have your wishes written down  Spoken wishes can be misunderstood, or not followed  Treatments may be given even if you do not want them  An advance directive may make it easier for your family to make difficult choices about your care  How do I decide what to put in my advance directives? · Make informed decisions:  Make sure you fully understand treatments or care you may receive   Think about the benefits and problems your decisions could cause for you or your family  Talk to healthcare providers if you have concerns or questions before you write down your wishes  You may also want to talk with your Sikh or , or a   Check your state laws to make sure that what you put in your advance directive is legal      · Sign all forms:  Sign and date your advance directive when you have finished  You may also need 2 witnesses to sign the forms  Witnesses cannot be your doctor or his staff, your spouse, heirs or beneficiaries, people you owe money to, or your chosen proxy  Talk to your family, proxy, and healthcare providers about your advance directive  Give each person a copy, and keep one for yourself in a place you can get to easily  Do not keep it hidden or locked away  · Review and revise your plans: You can revise your advance directive at any time, as long as you are able to make decisions  Review your plan every year, and when there are changes in your life, or your health  When you make changes, let your family, proxy, and healthcare providers know  Give each a new copy  Where can I find more information? · [employer]  [address] , [address]   Phone: 1- 825 - 631-9395  Phone: 9- 047 - [phone]  Web Address: http://www  aafp org  · [address] [facility])  [address] [facility], [address]  Phone: 1- [phone] - [phone]  Phone: [phone]  Web Address: Yesenia powell  [facility] AGREEMENT:   You have the right to help plan your care  To help with this plan, you must learn about your health condition and treatment options  You must also learn about advance directives and how they are used  Work with your healthcare providers to decide what care will be used to treat you  You always have the right to refuse treatment  The above information is an  only   It is not intended as medical advice for individual conditions or treatments  Talk to your doctor, nurse or pharmacist before following any medical regimen to see if it is safe and effective for you  © 2017 [address] [employer] Information is for End User's use only and may not be sold, redistributed or otherwise used for commercial purposes  All illustrations and images included in CareNotes® are the copyrighted property of [employer] M [employer]  or Charly Guerra

## 2019-08-13 ENCOUNTER — HOSPITAL ENCOUNTER (OUTPATIENT)
Dept: RADIOLOGY | Facility: HOSPITAL | Age: 69
Discharge: HOME | End: 2019-08-13
Attending: FAMILY MEDICINE
Payer: COMMERCIAL

## 2019-08-13 DIAGNOSIS — Z87.891 HISTORY OF TOBACCO USE: ICD-10-CM

## 2019-08-13 PROCEDURE — G0297 LDCT FOR LUNG CA SCREEN: HCPCS

## 2019-08-18 PROBLEM — E04.1 RIGHT THYROID NODULE: Status: ACTIVE | Noted: 2019-08-18

## 2019-09-19 DIAGNOSIS — E04.1 RIGHT THYROID NODULE: Primary | ICD-10-CM

## 2019-09-30 ENCOUNTER — TRANSCRIBE ORDERS (OUTPATIENT)
Dept: ADMINISTRATIVE | Facility: HOSPITAL | Age: 69
End: 2019-09-30

## 2019-09-30 ENCOUNTER — HOSPITAL ENCOUNTER (OUTPATIENT)
Dept: RADIOLOGY | Facility: HOSPITAL | Age: 69
Discharge: HOME | End: 2019-09-30
Attending: FAMILY MEDICINE
Payer: COMMERCIAL

## 2019-09-30 DIAGNOSIS — E04.1 RIGHT THYROID NODULE: ICD-10-CM

## 2019-09-30 PROCEDURE — 76536 US EXAM OF HEAD AND NECK: CPT

## 2019-10-03 ENCOUNTER — TELEPHONE (OUTPATIENT)
Dept: FAMILY MEDICINE CLINIC | Facility: CLINIC | Age: 69
End: 2019-10-03

## 2019-10-03 DIAGNOSIS — E04.1 RIGHT THYROID NODULE: Primary | ICD-10-CM

## 2019-12-09 ENCOUNTER — HOSPITAL ENCOUNTER (OUTPATIENT)
Dept: RADIOLOGY | Facility: HOSPITAL | Age: 69
Discharge: HOME | End: 2019-12-09
Attending: FAMILY MEDICINE
Payer: COMMERCIAL

## 2019-12-09 DIAGNOSIS — E04.1 RIGHT THYROID NODULE: ICD-10-CM

## 2019-12-09 PROCEDURE — 10005 FNA BX W/US GDN 1ST LES: CPT

## 2019-12-09 PROCEDURE — 88173 CYTOPATH EVAL FNA REPORT: CPT | Performed by: PATHOLOGY

## 2019-12-09 RX ORDER — LIDOCAINE HYDROCHLORIDE 10 MG/ML
10 INJECTION, SOLUTION EPIDURAL; INFILTRATION; INTRACAUDAL; PERINEURAL ONCE
Status: COMPLETED | OUTPATIENT
Start: 2019-12-09 | End: 2019-12-09

## 2019-12-09 RX ADMIN — LIDOCAINE HYDROCHLORIDE 2 ML: 10 INJECTION, SOLUTION EPIDURAL; INFILTRATION; INTRACAUDAL; PERINEURAL at 09:55

## 2020-03-17 ENCOUNTER — OFFICE VISIT (OUTPATIENT)
Dept: FAMILY MEDICINE CLINIC | Facility: CLINIC | Age: 70
End: 2020-03-17
Payer: COMMERCIAL

## 2020-03-17 VITALS
TEMPERATURE: 97.9 F | DIASTOLIC BLOOD PRESSURE: 70 MMHG | RESPIRATION RATE: 16 BRPM | BODY MASS INDEX: 26.36 KG/M2 | HEART RATE: 60 BPM | SYSTOLIC BLOOD PRESSURE: 134 MMHG | HEIGHT: 69 IN | WEIGHT: 178 LBS

## 2020-03-17 DIAGNOSIS — I25.10 CORONARY ARTERY DISEASE INVOLVING NATIVE CORONARY ARTERY OF NATIVE HEART WITHOUT ANGINA PECTORIS: ICD-10-CM

## 2020-03-17 DIAGNOSIS — I10 BENIGN ESSENTIAL HYPERTENSION: ICD-10-CM

## 2020-03-17 DIAGNOSIS — B07.0 PLANTAR WART: Primary | ICD-10-CM

## 2020-03-17 PROCEDURE — 17110 DESTRUCTION B9 LES UP TO 14: CPT | Performed by: FAMILY MEDICINE

## 2020-03-17 PROCEDURE — 3078F DIAST BP <80 MM HG: CPT | Performed by: FAMILY MEDICINE

## 2020-03-17 PROCEDURE — 99213 OFFICE O/P EST LOW 20 MIN: CPT | Performed by: FAMILY MEDICINE

## 2020-03-17 PROCEDURE — 3008F BODY MASS INDEX DOCD: CPT | Performed by: FAMILY MEDICINE

## 2020-03-17 PROCEDURE — 1036F TOBACCO NON-USER: CPT | Performed by: FAMILY MEDICINE

## 2020-03-17 PROCEDURE — 3075F SYST BP GE 130 - 139MM HG: CPT | Performed by: FAMILY MEDICINE

## 2020-03-17 PROCEDURE — 1160F RVW MEDS BY RX/DR IN RCRD: CPT | Performed by: FAMILY MEDICINE

## 2020-03-17 NOTE — PROGRESS NOTES
Assessment/Plan:    No problem-specific Assessment & Plan notes found for this encounter  Cryo x 1 done after shave, tolerated well after shave done, f/u if recurs, no definite FB seen    Cad/htn stable     Diagnoses and all orders for this visit:    Plantar wart  -     Lesion Destruction    Coronary artery disease involving native coronary artery of native heart without angina pectoris    Benign essential hypertension        Return if symptoms worsen or fail to improve  Subjective:      Patient ID: Chava Ryan is a 79 y o  male  Chief Complaint   Patient presents with    Pain     foot pain , right foot for 7 weeks prcma       HPI  Right foot pain due to bump  Sudden onset? White  Crusty  No FB exposure  Was in [place]  Usually wears socks/shoes  Tender to walk on  No redness or pus or bleeding    The following portions of the patient's history were reviewed and updated as appropriate: allergies, current medications, past family history, past medical history, past social history, past surgical history and problem list     Review of Systems   Constitutional: Negative for fever  Current Outpatient Medications   Medication Sig Dispense Refill    amLODIPine (NORVASC) 5 mg tablet 5 mg daily at bedtime        ASPIRIN 81 PO Take by mouth daily at bedtime      clopidogrel (PLAVIX) 75 mg tablet Take 75 mg by mouth daily at bedtime        metoprolol tartrate (LOPRESSOR) 50 mg tablet TAKE 1 TABLET TWICE A DAY      nitroglycerin (NITROSTAT) 0 4 mg SL tablet Place 1 tablet under the tongue every 5 (five) minutes as needed      rosuvastatin (CRESTOR) 20 MG tablet Take 20 mg by mouth daily at bedtime        tadalafil (CIALIS) 20 MG tablet Take 1 tablet (20 mg total) by mouth daily as needed for erectile dysfunction 20 tablet 5     No current facility-administered medications for this visit          Objective:    /70   Pulse 60   Temp 97 9 °F (36 6 °C)   Resp 16   Ht 5' 9" (1 753 m)   Wt 80 7 kg (178 lb)   BMI 26 29 kg/m²        Physical Exam   Constitutional: He appears well-developed  No distress  HENT:   Head: Normocephalic  Right Ear: External ear normal    Left Ear: External ear normal    Mouth/Throat: No oropharyngeal exudate  Eyes: Conjunctivae are normal  No scleral icterus  Neck: Neck supple  Cardiovascular: Normal rate, regular rhythm and intact distal pulses  Pulmonary/Chest: Effort normal and breath sounds normal  No respiratory distress  Abdominal: Soft  Musculoskeletal: He exhibits no edema or deformity  Neurological: He is alert  Skin: Skin is warm and dry  No rash noted  No pallor  Right plantar wart   Psychiatric: His behavior is normal  Thought content normal    Nursing note and vitals reviewed  BMI Counseling: Body mass index is 26 29 kg/m²  The BMI is above normal  Nutrition recommendations include decreasing portion sizes and moderation in carbohydrate intake  Exercise recommendations include exercising 3-5 times per week  No pharmacotherapy was ordered         Lesion Destruction  Date/Time: 3/17/2020 10:20 PM  Performed by: Yadira Juan DO  Authorized by: Yadira Juan DO     Procedure Details - Lesion Destruction:     Number of Lesions:  1  Lesion 1:     Body area:  Lower extremity    Lower extremity location:  R foot    Malignancy: benign lesion      Destruction method: cryotherapy    Lesion 6:      Tolerated well             Yadira Juan DO

## 2020-12-01 ENCOUNTER — VBI (OUTPATIENT)
Dept: ADMINISTRATIVE | Facility: OTHER | Age: 70
End: 2020-12-01

## 2021-02-09 ENCOUNTER — TELEPHONE (OUTPATIENT)
Dept: FAMILY MEDICINE CLINIC | Facility: CLINIC | Age: 71
End: 2021-02-09

## 2021-02-10 DIAGNOSIS — Z12.5 PROSTATE CANCER SCREENING: ICD-10-CM

## 2021-02-10 DIAGNOSIS — I25.9 CHRONIC ISCHEMIC HEART DISEASE, UNSPECIFIED: ICD-10-CM

## 2021-02-10 DIAGNOSIS — R73.01 IFG (IMPAIRED FASTING GLUCOSE): ICD-10-CM

## 2021-02-10 DIAGNOSIS — I10 BENIGN ESSENTIAL HYPERTENSION: Primary | ICD-10-CM

## 2021-02-10 DIAGNOSIS — E78.5 HYPERLIPIDEMIA LDL GOAL <100: ICD-10-CM

## 2021-03-12 LAB
ALBUMIN SERPL-MCNC: 4.5 G/DL (ref 3.7–4.7)
ALBUMIN/GLOB SERPL: 2.4 {RATIO} (ref 1.2–2.2)
ALP SERPL-CCNC: 55 IU/L (ref 39–117)
ALT SERPL-CCNC: 22 IU/L (ref 0–44)
AST SERPL-CCNC: 20 IU/L (ref 0–40)
BASOPHILS # BLD AUTO: 0.1 X10E3/UL (ref 0–0.2)
BASOPHILS NFR BLD AUTO: 1 %
BILIRUB SERPL-MCNC: 0.4 MG/DL (ref 0–1.2)
BUN SERPL-MCNC: 14 MG/DL (ref 8–27)
BUN/CREAT SERPL: 13 (ref 10–24)
CALCIUM SERPL-MCNC: 9.2 MG/DL (ref 8.6–10.2)
CHLORIDE SERPL-SCNC: 101 MMOL/L (ref 96–106)
CHOLEST SERPL-MCNC: 172 MG/DL (ref 100–199)
CO2 SERPL-SCNC: 25 MMOL/L (ref 20–29)
CREAT SERPL-MCNC: 1.08 MG/DL (ref 0.76–1.27)
EOSINOPHIL # BLD AUTO: 0.7 X10E3/UL (ref 0–0.4)
EOSINOPHIL NFR BLD AUTO: 9 %
ERYTHROCYTE [DISTWIDTH] IN BLOOD BY AUTOMATED COUNT: 13.4 % (ref 11.6–15.4)
GLOBULIN SER-MCNC: 1.9 G/DL (ref 1.5–4.5)
GLUCOSE SERPL-MCNC: 115 MG/DL (ref 65–99)
HBA1C MFR BLD: 6.2 % (ref 4.8–5.6)
HCT VFR BLD AUTO: 44.8 % (ref 37.5–51)
HDLC SERPL-MCNC: 42 MG/DL
HGB BLD-MCNC: 15.3 G/DL (ref 13–17.7)
IMM GRANULOCYTES # BLD: 0 X10E3/UL (ref 0–0.1)
IMM GRANULOCYTES NFR BLD: 0 %
LDLC SERPL CALC-MCNC: 97 MG/DL (ref 0–99)
LYMPHOCYTES # BLD AUTO: 2.6 X10E3/UL (ref 0.7–3.1)
LYMPHOCYTES NFR BLD AUTO: 33 %
MCH RBC QN AUTO: 29.2 PG (ref 26.6–33)
MCHC RBC AUTO-ENTMCNC: 34.2 G/DL (ref 31.5–35.7)
MCV RBC AUTO: 86 FL (ref 79–97)
MICRODELETION SYND BLD/T FISH: NORMAL
MONOCYTES # BLD AUTO: 0.6 X10E3/UL (ref 0.1–0.9)
MONOCYTES NFR BLD AUTO: 8 %
NEUTROPHILS # BLD AUTO: 3.8 X10E3/UL (ref 1.4–7)
NEUTROPHILS NFR BLD AUTO: 49 %
PLATELET # BLD AUTO: 232 X10E3/UL (ref 150–450)
POTASSIUM SERPL-SCNC: 4.6 MMOL/L (ref 3.5–5.2)
PROT SERPL-MCNC: 6.4 G/DL (ref 6–8.5)
PSA SERPL-MCNC: 1.2 NG/ML (ref 0–4)
RBC # BLD AUTO: 5.24 X10E6/UL (ref 4.14–5.8)
SL AMB EGFR AFRICAN AMERICAN: 79 ML/MIN/1.73
SL AMB EGFR NON AFRICAN AMERICAN: 69 ML/MIN/1.73
SODIUM SERPL-SCNC: 139 MMOL/L (ref 134–144)
TRIGL SERPL-MCNC: 193 MG/DL (ref 0–149)
WBC # BLD AUTO: 7.8 X10E3/UL (ref 3.4–10.8)

## 2021-03-18 ENCOUNTER — OFFICE VISIT (OUTPATIENT)
Dept: FAMILY MEDICINE CLINIC | Facility: CLINIC | Age: 71
End: 2021-03-18
Payer: COMMERCIAL

## 2021-03-18 ENCOUNTER — TELEPHONE (OUTPATIENT)
Dept: FAMILY MEDICINE CLINIC | Facility: CLINIC | Age: 71
End: 2021-03-18

## 2021-03-18 VITALS
TEMPERATURE: 97.1 F | HEIGHT: 69 IN | BODY MASS INDEX: 26.66 KG/M2 | RESPIRATION RATE: 16 BRPM | WEIGHT: 180 LBS | HEART RATE: 64 BPM | SYSTOLIC BLOOD PRESSURE: 150 MMHG | DIASTOLIC BLOOD PRESSURE: 80 MMHG

## 2021-03-18 DIAGNOSIS — R73.03 PREDIABETES: ICD-10-CM

## 2021-03-18 DIAGNOSIS — N52.9 ORGANIC IMPOTENCE: ICD-10-CM

## 2021-03-18 DIAGNOSIS — Z00.00 MEDICARE ANNUAL WELLNESS VISIT, SUBSEQUENT: Primary | ICD-10-CM

## 2021-03-18 DIAGNOSIS — I25.10 CORONARY ARTERY DISEASE INVOLVING NATIVE CORONARY ARTERY OF NATIVE HEART WITHOUT ANGINA PECTORIS: ICD-10-CM

## 2021-03-18 DIAGNOSIS — I10 BENIGN ESSENTIAL HYPERTENSION: ICD-10-CM

## 2021-03-18 DIAGNOSIS — Z95.5 STENTED CORONARY ARTERY: ICD-10-CM

## 2021-03-18 DIAGNOSIS — M20.62 TOE DEFORMITY, LEFT: ICD-10-CM

## 2021-03-18 DIAGNOSIS — E78.5 HYPERLIPIDEMIA LDL GOAL <100: ICD-10-CM

## 2021-03-18 PROBLEM — B07.0 PLANTAR WART: Status: RESOLVED | Noted: 2020-03-17 | Resolved: 2021-03-18

## 2021-03-18 PROCEDURE — 1160F RVW MEDS BY RX/DR IN RCRD: CPT | Performed by: FAMILY MEDICINE

## 2021-03-18 PROCEDURE — 1170F FXNL STATUS ASSESSED: CPT | Performed by: FAMILY MEDICINE

## 2021-03-18 PROCEDURE — 1125F AMNT PAIN NOTED PAIN PRSNT: CPT | Performed by: FAMILY MEDICINE

## 2021-03-18 PROCEDURE — 99214 OFFICE O/P EST MOD 30 MIN: CPT | Performed by: FAMILY MEDICINE

## 2021-03-18 PROCEDURE — G0439 PPPS, SUBSEQ VISIT: HCPCS | Performed by: FAMILY MEDICINE

## 2021-03-18 PROCEDURE — 3725F SCREEN DEPRESSION PERFORMED: CPT | Performed by: FAMILY MEDICINE

## 2021-03-18 PROCEDURE — 3288F FALL RISK ASSESSMENT DOCD: CPT | Performed by: FAMILY MEDICINE

## 2021-03-18 PROCEDURE — 1036F TOBACCO NON-USER: CPT | Performed by: FAMILY MEDICINE

## 2021-03-18 RX ORDER — CLOPIDOGREL BISULFATE 75 MG/1
75 TABLET ORAL
Qty: 30 TABLET | Refills: 1 | Status: SHIPPED | OUTPATIENT
Start: 2021-03-18 | End: 2021-04-12 | Stop reason: SDUPTHER

## 2021-03-18 RX ORDER — TADALAFIL 20 MG/1
20 TABLET ORAL DAILY PRN
Qty: 20 TABLET | Refills: 5 | Status: SHIPPED | OUTPATIENT
Start: 2021-03-18

## 2021-03-18 RX ORDER — METOPROLOL TARTRATE 50 MG/1
50 TABLET, FILM COATED ORAL 2 TIMES DAILY
Qty: 30 TABLET | Refills: 1 | Status: SHIPPED | OUTPATIENT
Start: 2021-03-18 | End: 2021-04-12 | Stop reason: SDUPTHER

## 2021-03-18 RX ORDER — ROSUVASTATIN CALCIUM 20 MG/1
20 TABLET, COATED ORAL
Qty: 30 TABLET | Refills: 1 | Status: SHIPPED | OUTPATIENT
Start: 2021-03-18 | End: 2021-04-12 | Stop reason: SDUPTHER

## 2021-03-18 RX ORDER — AMLODIPINE BESYLATE 5 MG/1
5 TABLET ORAL
Qty: 30 TABLET | Refills: 1 | Status: SHIPPED | OUTPATIENT
Start: 2021-03-18 | End: 2021-04-12 | Stop reason: SDUPTHER

## 2021-03-18 RX ORDER — NITROGLYCERIN 0.4 MG/1
0.4 TABLET SUBLINGUAL
Qty: 25 TABLET | Refills: 0 | Status: SHIPPED | OUTPATIENT
Start: 2021-03-18

## 2021-03-18 NOTE — TELEPHONE ENCOUNTER
DR Jf Llamas called and said there is an interaction with patients nitro and Cialis  Please call back  Julia Young

## 2021-03-18 NOTE — TELEPHONE ENCOUNTER
Please call pharmacy to let them know that I reiterated to the patient that the medications should never be used at the same time and that they can dispense them

## 2021-03-18 NOTE — PROGRESS NOTES
Assessment/Plan:    No problem-specific Assessment & Plan notes found for this encounter  Cad/stent, stable, rare ntg, wants new cardio here in [place]  HLD, stable, cont for risk reduction, refilled until sees cardio  htn stable, cont meds, refilled until sees cardio  ED new, clearly understands never to use with ntg at risk of death  Prediabetes, diet advised  Toe deformity, chronically swollen, #4 left foot, xr, possible podiatry     Diagnoses and all orders for this visit:    Medicare annual wellness visit, subsequent    Stented coronary artery    Hyperlipidemia LDL goal <100  -     rosuvastatin (Crestor) 20 MG tablet; Take 1 tablet (20 mg total) by mouth daily at bedtime    Organic impotence  -     tadalafil (Cialis) 20 MG tablet; Take 1 tablet (20 mg total) by mouth daily as needed for erectile dysfunction    Prediabetes    Benign essential hypertension  -     amLODIPine (NORVASC) 5 mg tablet; Take 1 tablet (5 mg total) by mouth daily at bedtime  -     metoprolol tartrate (LOPRESSOR) 50 mg tablet; Take 1 tablet (50 mg total) by mouth 2 (two) times a day    Coronary artery disease involving native coronary artery of native heart without angina pectoris  -     nitroglycerin (Nitrostat) 0 4 mg SL tablet; Place 1 tablet (0 4 mg total) under the tongue every 5 (five) minutes as needed for chest pain  -     Ambulatory referral to Cardiology; Future  -     clopidogrel (PLAVIX) 75 mg tablet; Take 1 tablet (75 mg total) by mouth daily at bedtime    Toe deformity, left  -     XR toe left fourth min 2 views; Future        No follow-ups on file  Subjective:      Patient ID: Jessica Briceno is a 70 y o  male      Chief Complaint   Patient presents with    Follow-up     medication ac/cma     Blood Pressure Check    Cardiologist referrl       HPI  Wart better  Needs new cardiologist, Dr Yehuda Montero retired, [facility]    On cialis  Tolerated  Effective when taken  Risks with ntg aware    On amlodipine and plavix  Last seen by cardio in 2 years  Ran out of crestor yesterday  Also metoprolol 50mg bid, lately 1x/d past week  norvasc 5mg qd  plavix 75mg qd    ntg has but not taken    Last labs noted  fbs 115    On diet   Low fat  Low salt  Not watching carbs    Daily exercise, walking    Has heart stent, x 1, since 2009    The following portions of the patient's history were reviewed and updated as appropriate: allergies, current medications, past family history, past medical history, past social history, past surgical history and problem list     Review of Systems   Respiratory: Negative for shortness of breath  Cardiovascular: Negative for chest pain  Current Outpatient Medications   Medication Sig Dispense Refill    amLODIPine (NORVASC) 5 mg tablet Take 1 tablet (5 mg total) by mouth daily at bedtime 30 tablet 1    ASPIRIN 81 PO Take by mouth daily at bedtime      clopidogrel (PLAVIX) 75 mg tablet Take 1 tablet (75 mg total) by mouth daily at bedtime 30 tablet 1    metoprolol tartrate (LOPRESSOR) 50 mg tablet Take 1 tablet (50 mg total) by mouth 2 (two) times a day 30 tablet 1    nitroglycerin (Nitrostat) 0 4 mg SL tablet Place 1 tablet (0 4 mg total) under the tongue every 5 (five) minutes as needed for chest pain 25 tablet 0    rosuvastatin (Crestor) 20 MG tablet Take 1 tablet (20 mg total) by mouth daily at bedtime 30 tablet 1    tadalafil (Cialis) 20 MG tablet Take 1 tablet (20 mg total) by mouth daily as needed for erectile dysfunction 20 tablet 5     No current facility-administered medications for this visit  Objective:    /80   Pulse 64   Temp (!) 97 1 °F (36 2 °C)   Resp 16   Ht 5' 9" (1 753 m)   Wt 81 6 kg (180 lb)   BMI 26 58 kg/m²        Physical Exam  Vitals signs and nursing note reviewed  Constitutional:       Appearance: He is well-developed  He is not ill-appearing  HENT:      Head: Normocephalic        Right Ear: Tympanic membrane normal       Left Ear: Tympanic membrane normal  Eyes:      General: No scleral icterus  Conjunctiva/sclera: Conjunctivae normal    Neck:      Musculoskeletal: Neck supple  Cardiovascular:      Rate and Rhythm: Normal rate and regular rhythm  Heart sounds: No murmur  Pulmonary:      Effort: Pulmonary effort is normal  No respiratory distress  Abdominal:      Palpations: Abdomen is soft  There is no mass  Tenderness: There is no abdominal tenderness  Hernia: No hernia is present  Genitourinary:     Penis: Normal        Scrotum/Testes: Normal       Prostate: Normal       Rectum: Normal    Musculoskeletal:         General: No deformity  Right lower leg: No edema  Left lower leg: No edema  Skin:     General: Skin is warm and dry  Coloration: Skin is not pale  Neurological:      Mental Status: He is alert  Motor: No weakness  Gait: Gait normal    Psychiatric:         Behavior: Behavior normal          Thought Content: Thought content normal        BMI Counseling: Body mass index is 26 58 kg/m²  The BMI is above normal  Nutrition recommendations include decreasing portion sizes and moderation in carbohydrate intake  Exercise recommendations include exercising 3-5 times per week  No pharmacotherapy was ordered                  Arun Alonzo DO

## 2021-03-19 NOTE — PATIENT INSTRUCTIONS

## 2021-03-19 NOTE — PROGRESS NOTES
Assessment and Plan:     Problem List Items Addressed This Visit        Active Problems    Benign essential hypertension    Relevant Medications    amLODIPine (NORVASC) 5 mg tablet    metoprolol tartrate (LOPRESSOR) 50 mg tablet    Coronary artery disease involving native coronary artery of native heart without angina pectoris    Relevant Medications    nitroglycerin (Nitrostat) 0 4 mg SL tablet    clopidogrel (PLAVIX) 75 mg tablet    amLODIPine (NORVASC) 5 mg tablet    metoprolol tartrate (LOPRESSOR) 50 mg tablet    tadalafil (Cialis) 20 MG tablet    Other Relevant Orders    Ambulatory referral to Cardiology    Hyperlipidemia LDL goal <100    Relevant Medications    rosuvastatin (Crestor) 20 MG tablet    Organic impotence    Relevant Medications    tadalafil (Cialis) 20 MG tablet    Medicare annual wellness visit, subsequent - Primary    Stented coronary artery    Prediabetes    Toe deformity, left    Relevant Orders    XR toe left fourth min 2 views           Preventive health issues were discussed with patient, and age appropriate screening tests were ordered as noted in patient's After Visit Summary  Personalized health advice and appropriate referrals for health education or preventive services given if needed, as noted in patient's After Visit Summary       History of Present Illness:     Patient presents for Medicare Annual Wellness visit    Patient Care Team:  Cristal Boyce DO as PCP - General  MD Cristal Dennis DO Vilma Rosenthal, MD as Endoscopist     Problem List:     Patient Active Problem List   Diagnosis    Benign essential hypertension    Chronic ischemic heart disease, unspecified    Coronary artery disease involving native coronary artery of native heart without angina pectoris    Hyperlipidemia LDL goal <100    Organic impotence    Antiplatelet or antithrombotic long-term use    Prediabetes    Medicare annual wellness visit, subsequent    Benign colon polyp    Hemorrhoids    Overweight    Stented coronary artery    History of tobacco use    Right thyroid nodule    Toe deformity, left      Past Medical and Surgical History:     Past Medical History:   Diagnosis Date    Chest pain     2008  last assessed    Congenital anomaly of coronary artery     2009  last assessed    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 [facility])     2009  last assessed    Fracture     right lower leg    Hypertension     Skin neoplasm     [id number] resolved    Wears glasses      Past Surgical History:   Procedure Laterality Date    ANGIOPLASTY      COLONOSCOPY      COLONOSCOPY N/A 9/10/2018    Procedure: COLONOSCOPY;  Surgeon: Anitha Cortés MD;  Location: [facility];   Service: Gastroenterology    CORONARY ANGIOPLASTY WITH STENT PLACEMENT      rca stent    FRACTURE SURGERY Right     tibia,fibula fx repair    TONSILLECTOMY      age 9    US GUIDED THYROID BIOPSY  2019      Family History:     Family History   Problem Relation Age of Onset    Coronary artery disease Father     Heart disease Father     Hypertension Father     Macular degeneration Father     Diabetes Sister     Heart disease Brother         CABG age 55,      Social History:        Social History     Socioeconomic History    Marital status: /Civil Union     Spouse name: None    Number of children: None    Years of education: None    Highest education level: None   Occupational History    None   Social Needs    Financial resource strain: None    Food insecurity     Worry: None     Inability: None    Transportation needs     Medical: None     Non-medical: None   Tobacco Use    Smoking status: Former Smoker     Quit date:      Years since quittin 2    Smokeless tobacco: Never Used   Substance and Sexual Activity    Alcohol use: Yes     Comment: occ    Drug use: No    Sexual activity: None   Lifestyle    Physical activity     Days per week: None     Minutes per session: None    Stress: None   Relationships    Social connections     Talks on phone: None     Gets together: None     Attends Cheondoism service: None     Active member of club or organization: None     Attends meetings of clubs or organizations: None     Relationship status: None    Intimate partner violence     Fear of current or ex partner: None     Emotionally abused: None     Physically abused: None     Forced sexual activity: None   Other Topics Concern    None   Social History Narrative    None      Medications and Allergies:     Current Outpatient Medications   Medication Sig Dispense Refill    amLODIPine (NORVASC) 5 mg tablet Take 1 tablet (5 mg total) by mouth daily at bedtime 30 tablet 1    ASPIRIN 81 PO Take by mouth daily at bedtime      clopidogrel (PLAVIX) 75 mg tablet Take 1 tablet (75 mg total) by mouth daily at bedtime 30 tablet 1    metoprolol tartrate (LOPRESSOR) 50 mg tablet Take 1 tablet (50 mg total) by mouth 2 (two) times a day 30 tablet 1    nitroglycerin (Nitrostat) 0 4 mg SL tablet Place 1 tablet (0 4 mg total) under the tongue every 5 (five) minutes as needed for chest pain 25 tablet 0    rosuvastatin (Crestor) 20 MG tablet Take 1 tablet (20 mg total) by mouth daily at bedtime 30 tablet 1    tadalafil (Cialis) 20 MG tablet Take 1 tablet (20 mg total) by mouth daily as needed for erectile dysfunction 20 tablet 5     No current facility-administered medications for this visit  Allergies   Allergen Reactions    Lisinopril Cough      Immunizations: There is no immunization history on file for this patient     Health Maintenance:         Topic Date Due    Hepatitis C Screening  Never done    Colorectal Cancer Screening  09/10/2028         Topic Date Due    COVID-19 Vaccine (1) Never done      Medicare Health Risk Assessment:     /80   Pulse 64   Temp (!) 97 1 °F (36 2 °C)   Resp 16   Ht 5' 9" (1 753 m)   Wt 81 6 kg (180 lb)   BMI 26 58 kg/m² Renita Bailey is here for his Subsequent Wellness visit  Last Medicare Wellness visit information reviewed, patient interviewed and updates made to the record today  Health Risk Assessment:   Patient rates overall health as excellent  Patient feels that their physical health rating is same  Patient is very satisfied with their life  Eyesight was rated as same  Hearing was rated as same  Patient feels that their emotional and mental health rating is same  Patients states they are never, rarely angry  Patient states they are never, rarely unusually tired/fatigued  Pain experienced in the last 7 days has been none  Patient states that he has experienced no weight loss or gain in last 6 months  Depression Screening:   PHQ-2 Score: 0      Fall Risk Screening: In the past year, patient has experienced: no history of falling in past year      Home Safety:  Patient does not have trouble with stairs inside or outside of their home  Patient has working smoke alarms and has no working carbon monoxide detector  Home safety hazards include: none  Nutrition:   Current diet is Low Saturated Fat  Medications:   Patient is not currently taking any over-the-counter supplements  Patient is able to manage medications  Activities of Daily Living (ADLs)/Instrumental Activities of Daily Living (IADLs):   Walk and transfer into and out of bed and chair?: Yes  Dress and groom yourself?: Yes    Bathe or shower yourself?: Yes    Feed yourself? Yes  Do your laundry/housekeeping?: Yes  Manage your money, pay your bills and track your expenses?: Yes  Make your own meals?: Yes    Do your own shopping?: Yes    Previous Hospitalizations:   Any hospitalizations or ED visits within the last 12 months?: No      Advance Care Planning:   Living will: Yes    Durable POA for healthcare:  Yes    Advanced directive: Yes    End of Life Decisions reviewed with patient: No      Cognitive Screening:   Provider or family/friend/caregiver concerned regarding cognition?: No    PREVENTIVE SCREENINGS      Cardiovascular Screening:    General: Screening Not Indicated and History Lipid Disorder      Diabetes Screening:     General: Screening Current      Colorectal Cancer Screening:     General: Screening Current      Prostate Cancer Screening:    General: Screening Current      Osteoporosis Screening:    General: Screening Not Indicated      Abdominal Aortic Aneurysm (AAA) Screening:    Risk factors include: age between 73-67 yo and tobacco use        General: Screening Not Indicated      Lung Cancer Screening:     General: Screening Not Indicated      Hepatitis C Screening:    General: Patient Declines    Hep C Screening Accepted: No     Screening, Brief Intervention, and Referral to Treatment (SBIRT)    Screening  Typical number of drinks in a day: 0  Typical number of drinks in a week: 0  Interpretation: Low risk drinking behavior  Single Item Drug Screening:  How often have you used an illegal drug (including marijuana) or a prescription medication for non-medical reasons in the past year? never    Single Item Drug Screen Score: 0  Interpretation: Negative screen for possible drug use disorder    Other Counseling Topics:   Car/seat belt/driving safety, sunscreen and regular weightbearing exercise       F/u 1m for bp check if does not see cardiologist    Clover Devlin, DO

## 2021-04-12 ENCOUNTER — CONSULT (OUTPATIENT)
Dept: CARDIOLOGY CLINIC | Facility: CLINIC | Age: 71
End: 2021-04-12
Payer: COMMERCIAL

## 2021-04-12 VITALS
SYSTOLIC BLOOD PRESSURE: 140 MMHG | DIASTOLIC BLOOD PRESSURE: 70 MMHG | HEIGHT: 69 IN | WEIGHT: 183 LBS | TEMPERATURE: 98.1 F | OXYGEN SATURATION: 97 % | HEART RATE: 55 BPM | BODY MASS INDEX: 27.11 KG/M2

## 2021-04-12 DIAGNOSIS — R01.1 CARDIAC MURMUR: ICD-10-CM

## 2021-04-12 DIAGNOSIS — I25.10 CORONARY ARTERY DISEASE INVOLVING NATIVE CORONARY ARTERY OF NATIVE HEART WITHOUT ANGINA PECTORIS: ICD-10-CM

## 2021-04-12 DIAGNOSIS — I10 BENIGN ESSENTIAL HYPERTENSION: ICD-10-CM

## 2021-04-12 DIAGNOSIS — Z98.61 HISTORY OF PERCUTANEOUS CORONARY INTERVENTION: ICD-10-CM

## 2021-04-12 DIAGNOSIS — E78.5 DYSLIPIDEMIA: ICD-10-CM

## 2021-04-12 PROCEDURE — 3078F DIAST BP <80 MM HG: CPT | Performed by: INTERNAL MEDICINE

## 2021-04-12 PROCEDURE — 1036F TOBACCO NON-USER: CPT | Performed by: INTERNAL MEDICINE

## 2021-04-12 PROCEDURE — 93000 ELECTROCARDIOGRAM COMPLETE: CPT | Performed by: INTERNAL MEDICINE

## 2021-04-12 PROCEDURE — 3077F SYST BP >= 140 MM HG: CPT | Performed by: INTERNAL MEDICINE

## 2021-04-12 PROCEDURE — 3008F BODY MASS INDEX DOCD: CPT | Performed by: INTERNAL MEDICINE

## 2021-04-12 PROCEDURE — 99214 OFFICE O/P EST MOD 30 MIN: CPT | Performed by: INTERNAL MEDICINE

## 2021-04-12 RX ORDER — ROSUVASTATIN CALCIUM 20 MG/1
20 TABLET, COATED ORAL
Qty: 90 TABLET | Refills: 2 | Status: SHIPPED | OUTPATIENT
Start: 2021-04-12 | End: 2021-11-17

## 2021-04-12 RX ORDER — AMLODIPINE BESYLATE 5 MG/1
5 TABLET ORAL
Qty: 90 TABLET | Refills: 2 | Status: SHIPPED | OUTPATIENT
Start: 2021-04-12 | End: 2021-11-17

## 2021-04-12 RX ORDER — METOPROLOL TARTRATE 50 MG/1
50 TABLET, FILM COATED ORAL 2 TIMES DAILY
Qty: 90 TABLET | Refills: 2 | Status: SHIPPED | OUTPATIENT
Start: 2021-04-12 | End: 2021-06-03

## 2021-04-12 RX ORDER — CLOPIDOGREL BISULFATE 75 MG/1
75 TABLET ORAL
Qty: 90 TABLET | Refills: 2 | Status: SHIPPED | OUTPATIENT
Start: 2021-04-12 | End: 2021-12-01

## 2021-04-12 NOTE — PROGRESS NOTES
Consultation - Cardiology Office  [facility]  Anita Liriano 70 y o  male MRN: [id number]  : 1950  Unit/Bed#:  Encounter: [id number]      Assessment:     1  Coronary artery disease involving native coronary artery of native heart without angina pectoris    2  Dyslipidemia    3  Benign essential hypertension    4  Cardiac murmur    5  History of percutaneous coronary intervention        Discussion summary and Plan:     1  Coronary artery disease  Patient had history of coronary artery disease with stenting done of right coronary artery in   Does not remember about other arteries  He has no recent cardiac workup he will be scheduled for echo Doppler and a exercise stress test   EKG shows no significant changes  2  Benign essential hypertension  Borderline acceptable continue metoprolol and amlodipine  Blood pressure has been acceptable  3  Dyslipidemia  On statins  Would like LDL to be lower than around 90  At some point we may need to increase the dose of Crestor or add Zetia  This was discussed with patient     4  Cardiac murmur  Echo Doppler has been ordered  5  History of erectile dysfunction  On Cialis        Thank you for your consultation  If you have any question please call me at [phone]    Counseling :  A description of the counseling  Goals and Barriers  Patient's ability to self care: Yes  Medication side effect reviewed with patient in detail and all their questions answered to their satisfaction  Primary Care Physician Requesting Consult: Leticia Josue DO    Reason for Consult / Principal Problem: Coronary artery disease with angioplasty of RCA      HPI :     Anita Liriano is a 70y o  year old male who was referred by primary care doctor for coronary artery disease    Patient who has medical history significant for coronary artery disease status post angioplasty of  Right coronary artery, hypertension, dyslipidemia who used to follow at [facility] now came to follow follow-up with us for his cardiovascular issues  Patient has not seen a cardiologist since 2019  He used to follow up with [facility]  He had a stenting done of his RCA in 2009  It was done for the symptoms of exertional shortness of breath and he underwent cardiac catheterization found to have RCA stenosis  He had a strong family history of heart problem multiple family members have history of coronary artery disease and has history of bypass surgery  He also himself has impaired glucose metabolism  He has no recent surgical history recently he was found to have thyroid nodule which is followed by the medical MD      He is  he lives with his wife  He does not smoke  Used to smoke quit 20 years ago    Review of Systems   Constitutional: Negative for activity change, chills, diaphoresis, fever and unexpected weight change  HENT: Negative for congestion  Eyes: Negative for discharge and redness  Respiratory: Negative for cough, chest tightness, shortness of breath and wheezing  Cardiovascular: Negative  Negative for chest pain, palpitations and leg swelling  Gastrointestinal: Negative for abdominal pain, diarrhea and nausea  Endocrine: Negative  Genitourinary: Negative for decreased urine volume and urgency  Musculoskeletal: Negative  Negative for arthralgias, back pain and gait problem  Skin: Negative for rash and wound  Allergic/Immunologic: Negative  Neurological: Negative for dizziness, seizures, syncope, weakness, light-headedness and headaches  Hematological: Negative  Psychiatric/Behavioral: Negative for agitation and confusion  The patient is not nervous/anxious          Historical Information   Past Medical History:   Diagnosis Date    Chest pain     [id number]  last assessed    Congenital anomaly of coronary artery     [id number]  last assessed    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;   Service: Gastroenterology    CORONARY ANGIOPLASTY WITH STENT PLACEMENT      rca stent    FRACTURE SURGERY Right     tibia,fibula fx repair    TONSILLECTOMY      age 9    US GUIDED THYROID BIOPSY  2019     Social History     Substance and Sexual Activity   Alcohol Use Yes    Comment: occ     Social History     Substance and Sexual Activity   Drug Use No     Social History     Tobacco Use   Smoking Status Former Smoker    Quit date: 12    Years since quittin 3   Smokeless Tobacco Never Used     Family History:   Family History   Problem Relation Age of Onset    Coronary artery disease Father     Heart disease Father     Hypertension Father     Macular degeneration Father     Diabetes Sister     Heart disease Brother         CABG age 55,       Meds/Allergies     Allergies   Allergen Reactions    Lisinopril Cough       Current Outpatient Medications:     amLODIPine (NORVASC) 5 mg tablet, Take 1 tablet (5 mg total) by mouth daily at bedtime, Disp: 90 tablet, Rfl: 2    ASPIRIN 81 PO, Take by mouth daily at bedtime, Disp: , Rfl:     clopidogrel (PLAVIX) 75 mg tablet, Take 1 tablet (75 mg total) by mouth daily at bedtime, Disp: 90 tablet, Rfl: 2    metoprolol tartrate (LOPRESSOR) 50 mg tablet, Take 1 tablet (50 mg total) by mouth 2 (two) times a day, Disp: 90 tablet, Rfl: 2    rosuvastatin (Crestor) 20 MG tablet, Take 1 tablet (20 mg total) by mouth daily at bedtime, Disp: 90 tablet, Rfl: 2    tadalafil (Cialis) 20 MG tablet, Take 1 tablet (20 mg total) by mouth daily as needed for erectile dysfunction, Disp: 20 tablet, Rfl: 5   nitroglycerin (Nitrostat) 0 4 mg SL tablet, Place 1 tablet (0 4 mg total) under the tongue every 5 (five) minutes as needed for chest pain (Patient not taking: Reported on 4/12/2021), Disp: 25 tablet, Rfl: 0    Vitals: Blood pressure 140/70, pulse 55, temperature 98 1 °F (36 7 °C), temperature source Temporal, height 5' 9" (1 753 m), weight 83 kg (183 lb), SpO2 97 %  Body mass index is 27 02 kg/m²  Wt Readings from Last 3 Encounters:   04/12/21 83 kg (183 lb)   03/18/21 81 6 kg (180 lb)   03/17/20 80 7 kg (178 lb)     Vitals:    04/12/21 1248   Weight: 83 kg (183 lb)     BP Readings from Last 3 Encounters:   04/12/21 140/70   03/18/21 150/80   03/17/20 134/70         Physical Exam    Neurologic:  Alert & oriented x 3, no new focal deficits, Not in any acute distress,  Constitutional:  Well developed, well nourished, non-toxic appearance   Eyes:  Pupil equal and reacting to light, conjunctiva normal, No JVP, No LNP   HENT:  Atraumatic, oropharynx moist, Neck- normal range of motion, no tenderness,  Neck supple   Respiratory:  Bilateral air entry, mostly clear to auscultation  Cardiovascular: S1-S2 regular with a 3/6 ejection systolic murmur and S4 is present  GI:  Soft, nondistended, normal bowel sounds, nontender, no hepatosplenomegaly appreciated  Musculoskeletal:  No edema, no tenderness, no deformities  Skin:  Well hydrated, no rash   Lymphatic:  No lymphadenopathy noted   Extremities:  No edema and distal pulses are present    Diagnostic Studies Review Cardio:  None recent    EKG:  Twelve lead EKG done 04/12/2021 shows sinus rhythm heart rate 55 beats per minute no significant abnormality      Lab Review   Lab Results   Component Value Date    WBC 7 8 03/11/2021    HGB 15 3 03/11/2021    HCT 44 8 03/11/2021    MCV 86 03/11/2021    RDW 13 4 03/11/2021     03/11/2021     BMP:  Lab Results   Component Value Date    SODIUM 139 03/11/2021    K 4 6 03/11/2021     03/11/2021    CO2 25 03/11/2021 BUN 14 03/11/2021    CREATININE 1 08 03/11/2021    GLUC 115 (H) 03/11/2021    CALCIUM 8 9 11/13/2017     LFT:  Lab Results   Component Value Date    AST 20 03/11/2021    ALT 22 03/11/2021    ALKPHOS 40 11/13/2017    TP 6 4 03/11/2021    ALB 4 5 03/11/2021        Lab Results   Component Value Date    HGBA1C 6 2 (H) 03/11/2021     Lipid Profile:   Lab Results   Component Value Date    CHOLESTEROL 172 03/11/2021    HDL 42 03/11/2021    LDLCALC 97 03/11/2021    TRIG 193 (H) 03/11/2021     Lab Results   Component Value Date    CHOLESTEROL 172 03/11/2021    CHOLESTEROL 167 12/17/2018         Dr Bandar Salcedo MD [facility]      "This note has been constructed using a voice recognition system  Therefore there may be syntax, spelling, and/or grammatical errors   Please call if you have any questions  "

## 2021-04-14 ENCOUNTER — IMMUNIZATIONS (OUTPATIENT)
Dept: FAMILY MEDICINE CLINIC | Facility: HOSPITAL | Age: 71
End: 2021-04-14

## 2021-04-14 DIAGNOSIS — Z23 ENCOUNTER FOR IMMUNIZATION: Primary | ICD-10-CM

## 2021-04-14 PROCEDURE — 0001A SARS-COV-2 / COVID-19 MRNA VACCINE (PFIZER-BIONTECH) 30 MCG: CPT

## 2021-04-14 PROCEDURE — 91300 SARS-COV-2 / COVID-19 MRNA VACCINE (PFIZER-BIONTECH) 30 MCG: CPT

## 2021-05-06 ENCOUNTER — IMMUNIZATIONS (OUTPATIENT)
Dept: FAMILY MEDICINE CLINIC | Facility: HOSPITAL | Age: 71
End: 2021-05-06

## 2021-05-06 DIAGNOSIS — Z23 ENCOUNTER FOR IMMUNIZATION: Primary | ICD-10-CM

## 2021-05-06 PROCEDURE — 91300 SARS-COV-2 / COVID-19 MRNA VACCINE (PFIZER-BIONTECH) 30 MCG: CPT

## 2021-05-06 PROCEDURE — 0002A SARS-COV-2 / COVID-19 MRNA VACCINE (PFIZER-BIONTECH) 30 MCG: CPT

## 2021-06-02 DIAGNOSIS — I10 BENIGN ESSENTIAL HYPERTENSION: ICD-10-CM

## 2021-06-03 RX ORDER — METOPROLOL TARTRATE 50 MG/1
TABLET, FILM COATED ORAL
Qty: 180 TABLET | Refills: 3 | Status: SHIPPED | OUTPATIENT
Start: 2021-06-03 | End: 2022-05-09

## 2021-06-18 ENCOUNTER — HOSPITAL ENCOUNTER (OUTPATIENT)
Dept: NON INVASIVE DIAGNOSTICS | Facility: HOSPITAL | Age: 71
Discharge: HOME | End: 2021-06-18
Attending: INTERNAL MEDICINE
Payer: COMMERCIAL

## 2021-06-18 DIAGNOSIS — Z98.61 HISTORY OF PERCUTANEOUS CORONARY INTERVENTION: ICD-10-CM

## 2021-06-18 DIAGNOSIS — I10 BENIGN ESSENTIAL HYPERTENSION: ICD-10-CM

## 2021-06-18 DIAGNOSIS — E78.5 DYSLIPIDEMIA: ICD-10-CM

## 2021-06-18 DIAGNOSIS — I25.10 CORONARY ARTERY DISEASE INVOLVING NATIVE CORONARY ARTERY OF NATIVE HEART WITHOUT ANGINA PECTORIS: ICD-10-CM

## 2021-06-18 PROCEDURE — 93018 CV STRESS TEST I&R ONLY: CPT | Performed by: INTERNAL MEDICINE

## 2021-06-18 PROCEDURE — 93306 TTE W/DOPPLER COMPLETE: CPT

## 2021-06-18 PROCEDURE — 93306 TTE W/DOPPLER COMPLETE: CPT | Performed by: INTERNAL MEDICINE

## 2021-06-18 PROCEDURE — 93016 CV STRESS TEST SUPVJ ONLY: CPT | Performed by: INTERNAL MEDICINE

## 2021-06-18 PROCEDURE — 93017 CV STRESS TEST TRACING ONLY: CPT

## 2021-06-21 ENCOUNTER — TELEPHONE (OUTPATIENT)
Dept: CARDIOLOGY CLINIC | Facility: CLINIC | Age: 71
End: 2021-06-21

## 2021-06-21 DIAGNOSIS — E78.5 DYSLIPIDEMIA: ICD-10-CM

## 2021-06-21 DIAGNOSIS — I10 BENIGN ESSENTIAL HYPERTENSION: ICD-10-CM

## 2021-06-21 DIAGNOSIS — R94.39 EQUIVOCAL STRESS TEST: ICD-10-CM

## 2021-06-21 DIAGNOSIS — I25.10 CORONARY ARTERY DISEASE INVOLVING NATIVE CORONARY ARTERY OF NATIVE HEART WITHOUT ANGINA PECTORIS: Primary | ICD-10-CM

## 2021-06-21 DIAGNOSIS — Z98.61 HISTORY OF PERCUTANEOUS CORONARY INTERVENTION: ICD-10-CM

## 2021-06-21 LAB
MAX DIASTOLIC BP: 74 MMHG
MAX HEART RATE: 109 BPM
MAX PREDICTED HEART RATE: 149 BPM
MAX. SYSTOLIC BP: 196 MMHG
PROTOCOL NAME: NORMAL
REASON FOR TERMINATION: NORMAL
TARGET HR FORMULA: NORMAL
TEST INDICATION: NORMAL
TIME IN EXERCISE PHASE: NORMAL

## 2021-06-21 NOTE — TELEPHONE ENCOUNTER
----- Message from Viet Garcia MD sent at 6/18/2021  5:01 PM EDT -----  Patient's echo shows normal LV systolic function  No significant valvular disease  Patient can keep an appointment  Please call patient about echo report

## 2021-06-22 ENCOUNTER — TELEPHONE (OUTPATIENT)
Dept: CARDIOLOGY CLINIC | Facility: CLINIC | Age: 71
End: 2021-06-22

## 2021-06-22 NOTE — TELEPHONE ENCOUNTER
----- Message from Jessica Feliz MD sent at 6/18/2021  4:08 PM EDT -----    Patient's stress test was equivocal   He could not get the target heart rate  He also had a hypertensive response to exercise  He will need a nuclear stress test   Which will be scheduled    Please let him know

## 2021-06-22 NOTE — TELEPHONE ENCOUNTER
I spoke with patient, made aware of results     Also made aware of request for nuclear stress test    Patient is not willing to do a nuclear stress test

## 2021-08-27 ENCOUNTER — OFFICE VISIT (OUTPATIENT)
Dept: CARDIOLOGY CLINIC | Facility: CLINIC | Age: 71
End: 2021-08-27
Payer: COMMERCIAL

## 2021-08-27 VITALS
HEART RATE: 56 BPM | WEIGHT: 179 LBS | TEMPERATURE: 98.2 F | SYSTOLIC BLOOD PRESSURE: 128 MMHG | OXYGEN SATURATION: 95 % | DIASTOLIC BLOOD PRESSURE: 60 MMHG | HEIGHT: 69 IN | BODY MASS INDEX: 26.51 KG/M2

## 2021-08-27 DIAGNOSIS — I10 BENIGN ESSENTIAL HYPERTENSION: ICD-10-CM

## 2021-08-27 DIAGNOSIS — E78.5 DYSLIPIDEMIA: ICD-10-CM

## 2021-08-27 DIAGNOSIS — I25.10 CORONARY ARTERY DISEASE INVOLVING NATIVE CORONARY ARTERY OF NATIVE HEART WITHOUT ANGINA PECTORIS: ICD-10-CM

## 2021-08-27 DIAGNOSIS — Z98.61 HISTORY OF PERCUTANEOUS CORONARY INTERVENTION: ICD-10-CM

## 2021-08-27 DIAGNOSIS — R01.1 CARDIAC MURMUR: ICD-10-CM

## 2021-08-27 PROCEDURE — 3078F DIAST BP <80 MM HG: CPT | Performed by: INTERNAL MEDICINE

## 2021-08-27 PROCEDURE — 99214 OFFICE O/P EST MOD 30 MIN: CPT | Performed by: INTERNAL MEDICINE

## 2021-08-27 PROCEDURE — 1036F TOBACCO NON-USER: CPT | Performed by: INTERNAL MEDICINE

## 2021-08-27 PROCEDURE — 3008F BODY MASS INDEX DOCD: CPT | Performed by: INTERNAL MEDICINE

## 2021-08-27 PROCEDURE — 3074F SYST BP LT 130 MM HG: CPT | Performed by: INTERNAL MEDICINE

## 2021-08-27 PROCEDURE — 1160F RVW MEDS BY RX/DR IN RCRD: CPT | Performed by: INTERNAL MEDICINE

## 2021-08-27 NOTE — PROGRESS NOTES
Emily Chandra, DO      HPI :     Alice Reyez is a 70y o  year old male who was referred by primary care doctor for coronary artery disease  Patient who has medical history significant for coronary artery disease status post angioplasty of  Right coronary artery, hypertension, dyslipidemia who used to follow at [facility] now came to follow follow-up with us for his cardiovascular issues  Patient has not seen a cardiologist since 2019  He used to follow up with [facility]  He had a stenting done of his RCA in 2009  It was done for the symptoms of exertional shortness of breath and he underwent cardiac catheterization found to have RCA stenosis  He had a strong family history of heart problem multiple family members have history of coronary artery disease and has history of bypass surgery  He also himself has impaired glucose metabolism  He has no recent surgical history recently he was found to have thyroid nodule which is followed by the medical MD      He is  he lives with his wife  He does not smoke  Used to smoke quit 20 years ago    08/27/2021  Above reviewed  Patient came for follow-up he is doing well he has no new complaints  Denies any chest pain any shortness of breath  His cardiac workup reviewed  His stress test was equivocal because he could not get the target heart rate he only got 73% maximum predicted heart rate  Echo shows normal LV systolic function  He has no symptoms we have order nuclear stress test which he has not done yet  His cholesterol do acceptable but LDL is still not at goal is around 97  He wants to try diet for some months and if it does not help he is willing to increase his statin therapy  He had history of coronary artery disease with angioplasty of LAD and unknown vessel otherwise he is doing well  No nausea no vomiting no fever no chills blood pressure is acceptable though he had mild hypertensive response to exercise  His medications reviewed no other cardiovascular complaint at this time  Review of Systems   Constitutional: Negative for activity change, chills, diaphoresis, fever and unexpected weight change  HENT: Negative for congestion  Eyes: Negative for discharge and redness  Respiratory: Negative for cough, chest tightness, shortness of breath and wheezing  Cardiovascular: Negative  Negative for chest pain, palpitations and leg swelling  Gastrointestinal: Negative for abdominal pain, diarrhea and nausea  Endocrine: Negative  Genitourinary: Negative for decreased urine volume and urgency  Musculoskeletal: Negative  Negative for arthralgias, back pain and gait problem  Skin: Negative for rash and wound  Allergic/Immunologic: Negative  Neurological: Negative for dizziness, seizures, syncope, weakness, light-headedness and headaches  Hematological: Negative  Psychiatric/Behavioral: Negative for agitation and confusion  The patient is not nervous/anxious  Historical Information   Past Medical History:   Diagnosis Date    Chest pain     [id number]  last assessed    Congenital anomaly of coronary artery     16Jan2009  last assessed    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)     16jan2009  last assessed    Fracture     right lower leg    Hypertension     Skin neoplasm     [id number] resolved    Wears glasses      Past Surgical History:   Procedure Laterality Date    ANGIOPLASTY      COLONOSCOPY      COLONOSCOPY N/A 9/10/2018    Procedure: COLONOSCOPY;  Surgeon: Kathy Leblanc MD;  Location: [facility];   Service: Gastroenterology    CORONARY ANGIOPLASTY WITH STENT PLACEMENT      rca stent    FRACTURE SURGERY Right     tibia,fibula fx repair    TONSILLECTOMY      age 10   [address] THYROID BIOPSY  12/9/2019     Social History     Substance and Sexual Activity   Alcohol Use Yes    Comment: occ     Social History     Substance and Sexual Activity   Drug Use No     Social History     Tobacco Use   Smoking Status Former Smoker    Quit date: 12    Years since quittin 6   Smokeless Tobacco Never Used     Family History:   Family History   Problem Relation Age of Onset    Coronary artery disease Father     Heart disease Father     Hypertension Father     Macular degeneration Father     Diabetes Sister     Heart disease Brother         CABG age 55,       Meds/Allergies     Allergies   Allergen Reactions    Lisinopril Cough       Current Outpatient Medications:     amLODIPine (NORVASC) 5 mg tablet, Take 1 tablet (5 mg total) by mouth daily at bedtime, Disp: 90 tablet, Rfl: 2    clopidogrel (PLAVIX) 75 mg tablet, Take 1 tablet (75 mg total) by mouth daily at bedtime, Disp: 90 tablet, Rfl: 2    metoprolol tartrate (LOPRESSOR) 50 mg tablet, TAKE 1 TABLET BY MOUTH  TWICE DAILY, Disp: 180 tablet, Rfl: 3    rosuvastatin (Crestor) 20 MG tablet, Take 1 tablet (20 mg total) by mouth daily at bedtime, Disp: 90 tablet, Rfl: 2    tadalafil (Cialis) 20 MG tablet, Take 1 tablet (20 mg total) by mouth daily as needed for erectile dysfunction, Disp: 20 tablet, Rfl: 5    ASPIRIN 81 PO, Take by mouth daily at bedtime (Patient not taking: Reported on 2021), Disp: , Rfl:     nitroglycerin (Nitrostat) 0 4 mg SL tablet, Place 1 tablet (0 4 mg total) under the tongue every 5 (five) minutes as needed for chest pain (Patient not taking: Reported on 2021), Disp: 25 tablet, Rfl: 0    Vitals: Blood pressure 128/60, pulse 56, temperature 98 2 °F (36 8 °C), height 5' 9" (1 753 m), weight 81 2 kg (179 lb), SpO2 95 %  Body mass index is 26 43 kg/m²    Wt Readings from Last 3 Encounters:   21 81 2 kg (179 lb)   21 83 kg (183 lb)   21 81 6 kg (180 lb)     Vitals:    21 1254   Weight: 81 2 kg (179 lb)     BP Readings from Last 3 Encounters:   21 128/60   21 140/70   21 150/80         Physical Exam  Constitutional:       General: He is not in acute distress  Appearance: He is well-developed  He is not diaphoretic  HENT:      Head: Normocephalic and atraumatic  Eyes:      Pupils: Pupils are equal, round, and reactive to light  Neck:      Thyroid: No thyromegaly  Vascular: No JVD  Trachea: No tracheal deviation  Cardiovascular:      Rate and Rhythm: Normal rate and regular rhythm  Heart sounds: S1 normal and S2 normal  Heart sounds not distant  Murmur heard  Systolic (ejection) murmur is present with a grade of 2/6  No friction rub  No gallop  No S3 or S4 sounds  Pulmonary:      Effort: Pulmonary effort is normal  No respiratory distress  Breath sounds: Normal breath sounds  No wheezing or rales  Chest:      Chest wall: No tenderness  Abdominal:      General: Bowel sounds are normal  There is no distension  Palpations: Abdomen is soft  Tenderness: There is no abdominal tenderness  Musculoskeletal:         General: No deformity  Cervical back: Neck supple  Skin:     General: Skin is warm and dry  Coloration: Skin is not pale  Findings: No rash  Neurological:      Mental Status: He is alert and oriented to person, place, and time  Psychiatric:         Behavior: Behavior normal          Judgment: Judgment normal              Diagnostic Studies Review Cardio:     exercise stress test   Exercise stress test done 06/18/2021 equivocal   Patient exercised for 8 minutes achieved workload of 80 8 metabolic equivalents  It was only 73% maximum predicted heart rate  And he has mild hypertensive response to exercise  He was recommended nuclear stress test which he did not due yet  Echo Doppler  Echo Doppler done 06/18/2020 shows normal LV systolic function  EF 55 60%, no significant valvular disease  EKG:  Twelve lead EKG done 04/12/2021 shows sinus rhythm heart rate 55 beats per minute no significant abnormality      Lab Review   Lab Results   Component Value Date    WBC 7 8 03/11/2021    HGB 15 3 03/11/2021    HCT 44 8 03/11/2021    MCV 86 03/11/2021    RDW 13 4 03/11/2021     03/11/2021     BMP:  Lab Results   Component Value Date    SODIUM 139 03/11/2021    K 4 6 03/11/2021     03/11/2021    CO2 25 03/11/2021    BUN 14 03/11/2021    CREATININE 1 08 03/11/2021    GLUC 115 (H) 03/11/2021    CALCIUM 8 9 11/13/2017     LFT:  Lab Results   Component Value Date    AST 20 03/11/2021    ALT 22 03/11/2021    ALKPHOS 40 11/13/2017    TP 6 4 03/11/2021    ALB 4 5 03/11/2021        Lab Results   Component Value Date    HGBA1C 6 2 (H) 03/11/2021     Lipid Profile:   Lab Results   Component Value Date    CHOLESTEROL 172 03/11/2021    HDL 42 03/11/2021    LDLCALC 97 03/11/2021    TRIG 193 (H) 03/11/2021     Lab Results   Component Value Date    CHOLESTEROL 172 03/11/2021    CHOLESTEROL 167 12/17/2018         Dr Luis Lal MD [facility] [place]      "This note has been constructed using a voice recognition system  Therefore there may be syntax, spelling, and/or grammatical errors   Please call if you have any questions  "

## 2021-09-10 ENCOUNTER — HOSPITAL ENCOUNTER (OUTPATIENT)
Dept: RADIOLOGY | Facility: HOSPITAL | Age: 71
Discharge: HOME | End: 2021-09-10
Attending: INTERNAL MEDICINE
Payer: COMMERCIAL

## 2021-09-10 ENCOUNTER — HOSPITAL ENCOUNTER (OUTPATIENT)
Dept: NON INVASIVE DIAGNOSTICS | Facility: HOSPITAL | Age: 71
Discharge: HOME | End: 2021-09-10
Attending: INTERNAL MEDICINE
Payer: COMMERCIAL

## 2021-09-10 DIAGNOSIS — I10 BENIGN ESSENTIAL HYPERTENSION: ICD-10-CM

## 2021-09-10 DIAGNOSIS — I25.10 CORONARY ARTERY DISEASE INVOLVING NATIVE CORONARY ARTERY OF NATIVE HEART WITHOUT ANGINA PECTORIS: ICD-10-CM

## 2021-09-10 DIAGNOSIS — R94.39 EQUIVOCAL STRESS TEST: ICD-10-CM

## 2021-09-10 DIAGNOSIS — E78.5 DYSLIPIDEMIA: ICD-10-CM

## 2021-09-10 DIAGNOSIS — Z98.61 HISTORY OF PERCUTANEOUS CORONARY INTERVENTION: ICD-10-CM

## 2021-09-10 LAB
CHEST PAIN STATEMENT: NORMAL
MAX DIASTOLIC BP: 78 MMHG
MAX HEART RATE: 108 BPM
MAX PREDICTED HEART RATE: 149 BPM
MAX. SYSTOLIC BP: 190 MMHG
PROTOCOL NAME: NORMAL
TARGET HR FORMULA: NORMAL
TEST INDICATION: NORMAL
TIME IN EXERCISE PHASE: NORMAL

## 2021-09-10 PROCEDURE — 78452 HT MUSCLE IMAGE SPECT MULT: CPT | Performed by: INTERNAL MEDICINE

## 2021-09-10 PROCEDURE — 78452 HT MUSCLE IMAGE SPECT MULT: CPT

## 2021-09-10 PROCEDURE — 93017 CV STRESS TEST TRACING ONLY: CPT

## 2021-09-10 PROCEDURE — G1004 CDSM NDSC: HCPCS

## 2021-09-10 PROCEDURE — A9502 TC99M TETROFOSMIN: HCPCS

## 2021-09-10 PROCEDURE — 93018 CV STRESS TEST I&R ONLY: CPT | Performed by: INTERNAL MEDICINE

## 2021-09-10 PROCEDURE — 93016 CV STRESS TEST SUPVJ ONLY: CPT | Performed by: INTERNAL MEDICINE

## 2021-09-10 RX ADMIN — REGADENOSON 0.4 MG: 0.08 INJECTION, SOLUTION INTRAVENOUS at 11:00

## 2021-09-13 ENCOUNTER — TELEPHONE (OUTPATIENT)
Dept: CARDIOLOGY CLINIC | Facility: CLINIC | Age: 71
End: 2021-09-13

## 2021-09-13 NOTE — TELEPHONE ENCOUNTER
----- Message from Barry Michel MD sent at 9/10/2021  3:04 PM EDT -----  Pt's Patient's stress test is normal    Patient can keep regular appointment  Please call patient with the result

## 2021-11-12 ENCOUNTER — OFFICE VISIT (OUTPATIENT)
Dept: FAMILY MEDICINE CLINIC | Facility: CLINIC | Age: 71
End: 2021-11-12
Payer: COMMERCIAL

## 2021-11-12 VITALS
DIASTOLIC BLOOD PRESSURE: 68 MMHG | SYSTOLIC BLOOD PRESSURE: 138 MMHG | TEMPERATURE: 97 F | BODY MASS INDEX: 26.57 KG/M2 | HEIGHT: 69 IN | HEART RATE: 53 BPM | RESPIRATION RATE: 16 BRPM | WEIGHT: 179.4 LBS

## 2021-11-12 DIAGNOSIS — I25.10 CORONARY ARTERY DISEASE INVOLVING NATIVE CORONARY ARTERY OF NATIVE HEART WITHOUT ANGINA PECTORIS: ICD-10-CM

## 2021-11-12 DIAGNOSIS — I10 BENIGN ESSENTIAL HYPERTENSION: ICD-10-CM

## 2021-11-12 DIAGNOSIS — N52.9 ORGANIC IMPOTENCE: ICD-10-CM

## 2021-11-12 DIAGNOSIS — R73.03 PREDIABETES: ICD-10-CM

## 2021-11-12 DIAGNOSIS — L20.84 INTRINSIC ECZEMA: Primary | ICD-10-CM

## 2021-11-12 DIAGNOSIS — Z13.1 SCREENING FOR DIABETES MELLITUS (DM): ICD-10-CM

## 2021-11-12 DIAGNOSIS — Z12.5 PROSTATE CANCER SCREENING: ICD-10-CM

## 2021-11-12 PROCEDURE — 99214 OFFICE O/P EST MOD 30 MIN: CPT | Performed by: FAMILY MEDICINE

## 2021-11-12 PROCEDURE — 3725F SCREEN DEPRESSION PERFORMED: CPT | Performed by: FAMILY MEDICINE

## 2021-11-12 PROCEDURE — 1160F RVW MEDS BY RX/DR IN RCRD: CPT | Performed by: FAMILY MEDICINE

## 2021-11-12 PROCEDURE — 3075F SYST BP GE 130 - 139MM HG: CPT | Performed by: FAMILY MEDICINE

## 2021-11-12 PROCEDURE — 3078F DIAST BP <80 MM HG: CPT | Performed by: FAMILY MEDICINE

## 2021-11-12 PROCEDURE — 1036F TOBACCO NON-USER: CPT | Performed by: FAMILY MEDICINE

## 2021-11-12 PROCEDURE — 3008F BODY MASS INDEX DOCD: CPT | Performed by: FAMILY MEDICINE

## 2021-11-12 RX ORDER — METHYLPREDNISOLONE 4 MG/1
TABLET ORAL
Qty: 21 TABLET | Refills: 0 | Status: SHIPPED | OUTPATIENT
Start: 2021-11-12 | End: 2021-11-18

## 2021-11-16 DIAGNOSIS — E78.5 DYSLIPIDEMIA: ICD-10-CM

## 2021-11-16 DIAGNOSIS — I10 BENIGN ESSENTIAL HYPERTENSION: ICD-10-CM

## 2021-11-17 RX ORDER — AMLODIPINE BESYLATE 5 MG/1
TABLET ORAL
Qty: 90 TABLET | Refills: 3 | Status: SHIPPED | OUTPATIENT
Start: 2021-11-17

## 2021-11-17 RX ORDER — ROSUVASTATIN CALCIUM 20 MG/1
TABLET, COATED ORAL
Qty: 90 TABLET | Refills: 3 | Status: SHIPPED | OUTPATIENT
Start: 2021-11-17

## 2021-11-30 DIAGNOSIS — I25.10 CORONARY ARTERY DISEASE INVOLVING NATIVE CORONARY ARTERY OF NATIVE HEART WITHOUT ANGINA PECTORIS: ICD-10-CM

## 2021-12-01 RX ORDER — CLOPIDOGREL BISULFATE 75 MG/1
TABLET ORAL
Qty: 90 TABLET | Refills: 3 | Status: SHIPPED | OUTPATIENT
Start: 2021-12-01

## 2022-03-12 LAB
ALBUMIN SERPL-MCNC: 4.6 G/DL (ref 3.7–4.7)
ALBUMIN/GLOB SERPL: 2.4 {RATIO} (ref 1.2–2.2)
ALP SERPL-CCNC: 52 IU/L (ref 44–121)
ALT SERPL-CCNC: 21 IU/L (ref 0–44)
AST SERPL-CCNC: 19 IU/L (ref 0–40)
BILIRUB SERPL-MCNC: 0.5 MG/DL (ref 0–1.2)
BUN SERPL-MCNC: 13 MG/DL (ref 8–27)
BUN/CREAT SERPL: 12 (ref 10–24)
CALCIUM SERPL-MCNC: 9.1 MG/DL (ref 8.6–10.2)
CHLORIDE SERPL-SCNC: 100 MMOL/L (ref 96–106)
CHOLEST SERPL-MCNC: 159 MG/DL (ref 100–199)
CO2 SERPL-SCNC: 26 MMOL/L (ref 20–29)
CREAT SERPL-MCNC: 1.12 MG/DL (ref 0.76–1.27)
EGFR: 70 ML/MIN/1.73
GLOBULIN SER-MCNC: 1.9 G/DL (ref 1.5–4.5)
GLUCOSE SERPL-MCNC: 119 MG/DL (ref 65–99)
HBA1C MFR BLD: 6.3 % (ref 4.8–5.6)
HDLC SERPL-MCNC: 41 MG/DL
LDLC SERPL CALC-MCNC: 87 MG/DL (ref 0–99)
MICRODELETION SYND BLD/T FISH: NORMAL
MICRODELETION SYND BLD/T FISH: NORMAL
POTASSIUM SERPL-SCNC: 4.7 MMOL/L (ref 3.5–5.2)
PROT SERPL-MCNC: 6.5 G/DL (ref 6–8.5)
PSA SERPL-MCNC: 1.1 NG/ML (ref 0–4)
SODIUM SERPL-SCNC: 139 MMOL/L (ref 134–144)
TRIGL SERPL-MCNC: 184 MG/DL (ref 0–149)

## 2022-03-15 ENCOUNTER — RA CDI HCC (OUTPATIENT)
Dept: OTHER | Facility: HOSPITAL | Age: 72
End: 2022-03-15

## 2022-03-15 NOTE — PROGRESS NOTES
[facility] 75  coding opportunities       Chart reviewed, no opportunity found:   [address]        Patients Insurance     Medicare Insurance: [employer] Advantage

## 2022-04-28 ENCOUNTER — TELEPHONE (OUTPATIENT)
Dept: FAMILY MEDICINE CLINIC | Facility: CLINIC | Age: 72
End: 2022-04-28

## 2022-05-05 DIAGNOSIS — I10 BENIGN ESSENTIAL HYPERTENSION: ICD-10-CM

## 2022-05-09 RX ORDER — METOPROLOL TARTRATE 50 MG/1
TABLET, FILM COATED ORAL
Qty: 180 TABLET | Refills: 3 | Status: SHIPPED | OUTPATIENT
Start: 2022-05-09

## 2022-05-13 ENCOUNTER — OFFICE VISIT (OUTPATIENT)
Dept: FAMILY MEDICINE CLINIC | Facility: CLINIC | Age: 72
End: 2022-05-13
Payer: COMMERCIAL

## 2022-05-13 VITALS
TEMPERATURE: 96.1 F | WEIGHT: 179.8 LBS | RESPIRATION RATE: 16 BRPM | HEIGHT: 69 IN | OXYGEN SATURATION: 99 % | DIASTOLIC BLOOD PRESSURE: 82 MMHG | BODY MASS INDEX: 26.63 KG/M2 | HEART RATE: 55 BPM | SYSTOLIC BLOOD PRESSURE: 136 MMHG

## 2022-05-13 DIAGNOSIS — Z12.12 SCREENING FOR COLORECTAL CANCER: ICD-10-CM

## 2022-05-13 DIAGNOSIS — L20.84 INTRINSIC ECZEMA: ICD-10-CM

## 2022-05-13 DIAGNOSIS — B07.0 PLANTAR WART: ICD-10-CM

## 2022-05-13 DIAGNOSIS — Z00.00 MEDICARE ANNUAL WELLNESS VISIT, SUBSEQUENT: Primary | ICD-10-CM

## 2022-05-13 DIAGNOSIS — Z12.11 SCREENING FOR COLORECTAL CANCER: ICD-10-CM

## 2022-05-13 DIAGNOSIS — I10 BENIGN ESSENTIAL HYPERTENSION: ICD-10-CM

## 2022-05-13 PROCEDURE — 1160F RVW MEDS BY RX/DR IN RCRD: CPT | Performed by: FAMILY MEDICINE

## 2022-05-13 PROCEDURE — 3079F DIAST BP 80-89 MM HG: CPT | Performed by: FAMILY MEDICINE

## 2022-05-13 PROCEDURE — 3075F SYST BP GE 130 - 139MM HG: CPT | Performed by: FAMILY MEDICINE

## 2022-05-13 PROCEDURE — G0439 PPPS, SUBSEQ VISIT: HCPCS | Performed by: FAMILY MEDICINE

## 2022-05-13 PROCEDURE — 1125F AMNT PAIN NOTED PAIN PRSNT: CPT | Performed by: FAMILY MEDICINE

## 2022-05-13 PROCEDURE — 17110 DESTRUCTION B9 LES UP TO 14: CPT | Performed by: FAMILY MEDICINE

## 2022-05-13 PROCEDURE — 1003F LEVEL OF ACTIVITY ASSESS: CPT | Performed by: FAMILY MEDICINE

## 2022-05-13 PROCEDURE — 3288F FALL RISK ASSESSMENT DOCD: CPT | Performed by: FAMILY MEDICINE

## 2022-05-13 PROCEDURE — 1101F PT FALLS ASSESS-DOCD LE1/YR: CPT | Performed by: FAMILY MEDICINE

## 2022-05-13 PROCEDURE — 99214 OFFICE O/P EST MOD 30 MIN: CPT | Performed by: FAMILY MEDICINE

## 2022-05-13 PROCEDURE — 1170F FXNL STATUS ASSESSED: CPT | Performed by: FAMILY MEDICINE

## 2022-05-13 PROCEDURE — 3725F SCREEN DEPRESSION PERFORMED: CPT | Performed by: FAMILY MEDICINE

## 2022-05-13 PROCEDURE — 3008F BODY MASS INDEX DOCD: CPT | Performed by: FAMILY MEDICINE

## 2022-05-13 PROCEDURE — 1036F TOBACCO NON-USER: CPT | Performed by: FAMILY MEDICINE

## 2022-05-13 NOTE — PATIENT INSTRUCTIONS
SHINGRIX is the new, more effective vaccine for Shingles  It is more than 90% effective  You should check with your local pharmacy and insurance company for availability and coverage  It is a 2 shot series, with the second shot given between 2-6 months after the first, and is approved for ages 48 and up  Medicare Preventive Visit Patient Instructions  Thank you for completing your Welcome to Medicare Visit or Medicare Annual Wellness Visit today  Your next wellness visit will be due in one year (5/14/2023)  The screening/preventive services that you may require over the next 5-10 years are detailed below  Some tests may not apply to you based off risk factors and/or age  Screening tests ordered at today's visit but not completed yet may show as past due  Also, please note that scanned in results may not display below  Preventive Screenings:  Service Recommendations Previous Testing/Comments   Colorectal Cancer Screening  · Colonoscopy    · Fecal Occult Blood Test (FOBT)/Fecal Immunochemical Test (FIT)  · Fecal DNA/Cologuard Test  · Flexible Sigmoidoscopy Age: 54-65 years old   Colonoscopy: every 10 years (May be performed more frequently if at higher risk)  OR  FOBT/FIT: every 1 year  OR  Cologuard: every 3 years  OR  Sigmoidoscopy: every 5 years  Screening may be recommended earlier than age 48 if at higher risk for colorectal cancer  Also, an individualized decision between you and your healthcare provider will decide whether screening between the ages of 74-80 would be appropriate   Colonoscopy: 09/10/2018  FOBT/FIT: Not on file  Cologuard: Not on file  Sigmoidoscopy: Not on file    Screening Current     Prostate Cancer Screening Individualized decision between patient and health care provider in men between ages of 53-78   Medicare will cover every 12 months beginning on the day after your 50th birthday PSA: 1 1 ng/mL     Screening Current     Hepatitis C Screening Once for adults born between [birth year] and 1965  More frequently in patients at high risk for Hepatitis C Hep C Antibody: Not on file    Risks and Benefits Discussed   Diabetes Screening 1-2 times per year if you're at risk for diabetes or have pre-diabetes Fasting glucose: No results in last 5 years   A1C: 6 3 %    Screening Current   Cholesterol Screening Once every 5 years if you don't have a lipid disorder  May order more often based on risk factors  Lipid panel: 03/11/2022    Screening Not Indicated  History Lipid Disorder      Other Preventive Screenings Covered by Medicare:  1  Abdominal Aortic Aneurysm (AAA) Screening: covered once if your at risk  You're considered to be at risk if you have a family history of AAA or a male between the age of 73-68 who smoking at least 100 cigarettes in your lifetime  2  Lung Cancer Screening: covers low dose CT scan once per year if you meet all of the following conditions: (1) Age 50-69; (2) No signs or symptoms of lung cancer; (3) Current smoker or have quit smoking within the last 15 years; (4) You have a tobacco smoking history of at least 30 pack years (packs per day x number of years you smoked); (5) You get a written order from a healthcare provider  3  Glaucoma Screening: covered annually if you're considered high risk: (1) You have diabetes OR (2) Family history of glaucoma OR (3)  aged 48 and older OR (3)  American aged 72 and older  3  Osteoporosis Screening: covered every 2 years if you meet one of the following conditions: (1) Have a vertebral abnormality; (2) On glucocorticoid therapy for more than 3 months; (3) Have primary hyperparathyroidism; (4) On osteoporosis medications and need to assess response to drug therapy  5  HIV Screening: covered annually if you're between the age of 12-76  Also covered annually if you are younger than 13 and older than 72 with risk factors for HIV infection   For pregnant patients, it is covered up to 3 times per pregnancy  Immunizations:  Immunization Recommendations   Influenza Vaccine Annual influenza vaccination during flu season is recommended for all persons aged >= 6 months who do not have contraindications   Pneumococcal Vaccine (Prevnar and Pneumovax)  * Prevnar = PCV13  * Pneumovax = PPSV23 Adults 25-60 years old: 1-3 doses may be recommended based on certain risk factors  Adults 72 years old: Prevnar (PCV13) vaccine recommended followed by Pneumovax (PPSV23) vaccine  If already received PPSV23 since turning 65, then PCV13 recommended at least one year after PPSV23 dose  Hepatitis B Vaccine 3 dose series if at intermediate or high risk (ex: diabetes, end stage renal disease, liver disease)   Tetanus (Td) Vaccine - COST NOT COVERED BY MEDICARE PART B Following completion of primary series, a booster dose should be given every 10 years to maintain immunity against tetanus  Td may also be given as tetanus wound prophylaxis  Tdap Vaccine - COST NOT COVERED BY MEDICARE PART B Recommended at least once for all adults  For pregnant patients, recommended with each pregnancy  Shingles Vaccine (Shingrix) - COST NOT COVERED BY MEDICARE PART B  2 shot series recommended in those aged 48 and above     Health Maintenance Due:      Topic Date Due    Hepatitis C Screening  Never done    Colorectal Cancer Screening  09/10/2028     Immunizations Due:      Topic Date Due    Pneumococcal Vaccine: 65+ Years (1 - PCV) Never done    COVID-19 Vaccine (3 - Booster for [employer] series) 10/06/2021     Advance Directives   What are advance directives? Advance directives are legal documents that state your wishes and plans for medical care  These plans are made ahead of time in case you lose your ability to make decisions for yourself  Advance directives can apply to any medical decision, such as the treatments you want, and if you want to donate organs  What are the types of advance directives?   There are many types of advance directives, and each state has rules about how to use them  You may choose a combination of any of the following:  · Living will: This is a written record of the treatment you want  You can also choose which treatments you do not want, which to limit, and which to stop at a certain time  This includes surgery, medicine, IV fluid, and tube feedings  · Durable power of  for healthcare [facility]): This is a written record that states who you want to make healthcare choices for you when you are unable to make them for yourself  This person, called a proxy, is usually a family member or a friend  You may choose more than 1 proxy  · Do not resuscitate (DNR) order:  A DNR order is used in case your heart stops beating or you stop breathing  It is a request not to have certain forms of treatment, such as CPR  A DNR order may be included in other types of advance directives  · Medical directive: This covers the care that you want if you are in a coma, near death, or unable to make decisions for yourself  You can list the treatments you want for each condition  Treatment may include pain medicine, surgery, blood transfusions, dialysis, IV or tube feedings, and a ventilator (breathing machine)  · Values history: This document has questions about your views, beliefs, and how you feel and think about life  This information can help others choose the care that you would choose  Why are advance directives important? An advance directive helps you control your care  Although spoken wishes may be used, it is better to have your wishes written down  Spoken wishes can be misunderstood, or not followed  Treatments may be given even if you do not want them  An advance directive may make it easier for your family to make difficult choices about your care     Weight Management   Why it is important to manage your weight:  Being overweight increases your risk of health conditions such as heart disease, high blood pressure, type 2 diabetes, and certain types of cancer  It can also increase your risk for osteoarthritis, sleep apnea, and other respiratory problems  Aim for a slow, steady weight loss  Even a small amount of weight loss can lower your risk of health problems  How to lose weight safely:  A safe and healthy way to lose weight is to eat fewer calories and get regular exercise  You can lose up about 1 pound a week by decreasing the number of calories you eat by 500 calories each day  Healthy meal plan for weight management:  A healthy meal plan includes a variety of foods, contains fewer calories, and helps you stay healthy  A healthy meal plan includes the following:  · Eat whole-grain foods more often  A healthy meal plan should contain fiber  Fiber is the part of grains, fruits, and vegetables that is not broken down by your body  Whole-grain foods are healthy and provide extra fiber in your diet  Some examples of whole-grain foods are whole-wheat breads and pastas, oatmeal, brown rice, and bulgur  · Eat a variety of vegetables every day  Include dark, leafy greens such as spinach, kale, della greens, and mustard greens  Eat yellow and orange vegetables such as carrots, sweet potatoes, and winter squash  · Eat a variety of fruits every day  Choose fresh or canned fruit (canned in its own juice or light syrup) instead of juice  Fruit juice has very little or no fiber  · Eat low-fat dairy foods  Drink fat-free (skim) milk or 1% milk  Eat fat-free yogurt and low-fat cottage cheese  Try low-fat cheeses such as mozzarella and other reduced-fat cheeses  · Choose meat and other protein foods that are low in fat  Choose beans or other legumes such as split peas or lentils  Choose fish, skinless poultry (chicken or turkey), or lean cuts of red meat (beef or pork)  Before you cook meat or poultry, cut off any visible fat  · Use less fat and oil  Try baking foods instead of frying them   Add less fat, such as margarine, sour cream, regular salad dressing and mayonnaise to foods  Eat fewer high-fat foods  Some examples of high-fat foods include french fries, doughnuts, ice cream, and cakes  · Eat fewer sweets  Limit foods and drinks that are high in sugar  This includes candy, cookies, regular soda, and sweetened drinks  Exercise:  Exercise at least 30 minutes per day on most days of the week  Some examples of exercise include walking, biking, dancing, and swimming  You can also fit in more physical activity by taking the stairs instead of the elevator or parking farther away from stores  Ask your healthcare provider about the best exercise plan for you  © Copyright [employer] 2018 Information is for End User's use only and may not be sold, redistributed or otherwise used for commercial purposes   All illustrations and images included in CareNotes® are the copyrighted property of A D A M , Inc  or [address] [place] [address]

## 2022-05-13 NOTE — PROGRESS NOTES
Assessment/Plan:    No problem-specific Assessment & Plan notes found for this encounter  Eczema hand, stable, intermittent use of steroids aware    Right plantar warts, 3 areas, treated with cryo, tolerated well, options for future include cryo vs topical options    CAD/htn stable, keep up with diet, continue meds, f/u cardiology       Diagnoses and all orders for this visit:    Medicare annual wellness visit, subsequent    Intrinsic eczema  -     triamcinolone (KENALOG) 0 1 % ointment; Apply topically 2 (two) times a day , short term, hands    Plantar wart  -     Lesion Destruction    Benign essential hypertension        3 warts right foot      No follow-ups on file  Subjective:      Patient ID: Og Payne is a 67 y o  male  Chief Complaint   Patient presents with    Follow-up     1 month f/u, blood work review nm n   [facility]    Plantar Warts       HPI  Taking her meds  Eats healthy  Exercises  Mostly very good diet  Runs with dog  Good urine flow  No blood in stool    Recurrent wart right foot  No bleeding  Dry and crusty  Hx of same    No cp  No myalgias on statin    The following portions of the patient's history were reviewed and updated as appropriate: allergies, current medications, past family history, past medical history, past social history, past surgical history and problem list     Review of Systems   Constitutional: Negative for chills and fever           Current Outpatient Medications   Medication Sig Dispense Refill    amLODIPine (NORVASC) 5 mg tablet TAKE 1 TABLET BY MOUTH  DAILY AT BEDTIME 90 tablet 3    clopidogrel (PLAVIX) 75 mg tablet TAKE 1 TABLET BY MOUTH  DAILY AT BEDTIME 90 tablet 3    metoprolol tartrate (LOPRESSOR) 50 mg tablet TAKE 1 TABLET BY MOUTH  TWICE DAILY 180 tablet 3    nitroglycerin (Nitrostat) 0 4 mg SL tablet Place 1 tablet (0 4 mg total) under the tongue every 5 (five) minutes as needed for chest pain 25 tablet 0    rosuvastatin (CRESTOR) 20 MG tablet TAKE 1 TABLET BY MOUTH  DAILY AT BEDTIME 90 tablet 3    tadalafil (Cialis) 20 MG tablet Take 1 tablet (20 mg total) by mouth daily as needed for erectile dysfunction 20 tablet 5    triamcinolone (KENALOG) 0 1 % ointment Apply topically 2 (two) times a day , short term, hands 80 g 5     No current facility-administered medications for this visit  Objective:    /82   Pulse 55   Temp (!) 96 1 °F (35 6 °C)   Resp 16   Ht 5' 9" (1 753 m)   Wt 81 6 kg (179 lb 12 8 oz)   SpO2 99%   BMI 26 55 kg/m²        Physical Exam  Vitals and nursing note reviewed  Constitutional:       General: He is not in acute distress  Appearance: He is well-developed  He is not ill-appearing  HENT:      Head: Normocephalic  Right Ear: Tympanic membrane normal  There is no impacted cerumen  Left Ear: Tympanic membrane normal  There is no impacted cerumen  Mouth/Throat:      Pharynx: No oropharyngeal exudate  Eyes:      General: No scleral icterus  Conjunctiva/sclera: Conjunctivae normal    Cardiovascular:      Rate and Rhythm: Normal rate and regular rhythm  Heart sounds: No murmur heard  Pulmonary:      Effort: Pulmonary effort is normal  No respiratory distress  Breath sounds: No wheezing  Abdominal:      General: There is no distension  Palpations: Abdomen is soft  Tenderness: There is no abdominal tenderness  Genitourinary:     Penis: Normal        Testes: Normal       Prostate: Normal       Rectum: Normal    Musculoskeletal:         General: No deformity  Cervical back: Neck supple  Skin:     General: Skin is warm and dry  Coloration: Skin is not pale  Comments: Plantar warts right foot   Neurological:      Mental Status: He is alert  Motor: No weakness  Gait: Gait normal    Psychiatric:         Behavior: Behavior normal          Thought Content:  Thought content normal        Lesion Destruction    Date/Time: 5/13/2022 11:00 AM  Performed by: Geraldo Edwards DO  Authorized by: Geraldo Edwards DO   Universal Protocol:  Consent: Verbal consent obtained  Written consent not obtained    Risks and benefits: risks, benefits and alternatives were discussed    Procedure Details - Lesion Destruction:     Number of Lesions:  3  Lesion 1:     Body area:  Lower extremity    Lower extremity location:  R foot    Initial size (mm):  3    Final defect size (mm):  3    Malignancy: benign lesion      Destruction method: cryotherapy    Lesion 2:     Body area:  Lower extremity    Lower extremity location:  R foot    Initial size (mm):  3    Final defect size (mm):  3    Malignancy: benign lesion      Destruction method: cryotherapy    Lesion 3:     Body area:  Lower extremity    Lower extremity location:  R foot    Initial size (mm):  2    Final defect size (mm):  2    Malignancy: benign lesion      Destruction method: cryotherapy                 Geraldo Edwards DO

## 2022-05-14 NOTE — PROGRESS NOTES
Assessment and Plan:     Problem List Items Addressed This Visit        Active Problems    Benign essential hypertension    Medicare annual wellness visit, subsequent - Primary    Intrinsic eczema    Relevant Medications    triamcinolone (KENALOG) 0 1 % ointment    Plantar wart    Relevant Medications    triamcinolone (KENALOG) 0 1 % ointment    Other Relevant Orders    Lesion Destruction           Preventive health issues were discussed with patient, and age appropriate screening tests were ordered as noted in patient's After Visit Summary  Personalized health advice and appropriate referrals for health education or preventive services given if needed, as noted in patient's After Visit Summary       History of Present Illness:     Patient presents for Medicare Annual Wellness visit    Patient Care Team:  Cj Bella DO as PCP - General  MD Cj Ely DO Homero Norris, MD as Endoscopist     Problem List:     Patient Active Problem List   Diagnosis    Benign essential hypertension    Chronic ischemic heart disease, unspecified    Coronary artery disease involving native coronary artery of native heart without angina pectoris    Hyperlipidemia LDL goal <100    Organic impotence    Antiplatelet or antithrombotic long-term use    Prediabetes    Medicare annual wellness visit, subsequent    Benign colon polyp    Hemorrhoids    Overweight    Stented coronary artery    History of tobacco use    Right thyroid nodule    Toe deformity, left    Intrinsic eczema    Screening for diabetes mellitus (DM)    Prostate cancer screening    Plantar wart of right foot    Plantar wart      Past Medical and Surgical History:     Past Medical History:   Diagnosis Date    Chest pain     [id number]  last assessed    Congenital anomaly of coronary artery     [id number]  last assessed    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 [facility])     16jan2009  last assessed    Fracture right lower leg    Hypertension     Skin neoplasm     [id number] resolved    Wears glasses      Past Surgical History:   Procedure Laterality Date    ANGIOPLASTY      COLONOSCOPY      COLONOSCOPY N/A 9/10/2018    Procedure: COLONOSCOPY;  Surgeon: Federica Mo MD;  Location: [facility] GI [facility];   Service: Gastroenterology    CORONARY ANGIOPLASTY WITH STENT PLACEMENT      rca stent    FRACTURE SURGERY Right     tibia,fibula fx repair    TONSILLECTOMY      age 9    US GUIDED THYROID BIOPSY  2019      Family History:     Family History   Problem Relation Age of Onset    Coronary artery disease Father     Heart disease Father     Hypertension Father     Macular degeneration Father     Diabetes Sister     Heart disease Brother         CABG age 55,      Social History:     Social History     Socioeconomic History    Marital status: /Civil Union     Spouse name: None    Number of children: None    Years of education: None    Highest education level: None   Occupational History    None   Tobacco Use    Smoking status: Former Smoker     Quit date:      Years since quittin 3    Smokeless tobacco: Never Used   Substance and Sexual Activity    Alcohol use: Yes     Comment: occ    Drug use: No    Sexual activity: None   Other Topics Concern    None   Social History Narrative    None     Social Determinants of Health     Financial Resource Strain: Not on file   Food Insecurity: Not on file   Transportation Needs: Not on file   Physical Activity: Not on file   Stress: Not on file   Social Connections: Not on file   Intimate Partner Violence: Not on file   Housing Stability: Not on file      Medications and Allergies:     Current Outpatient Medications   Medication Sig Dispense Refill    amLODIPine (NORVASC) 5 mg tablet TAKE 1 TABLET BY MOUTH  DAILY AT BEDTIME 90 tablet 3    clopidogrel (PLAVIX) 75 mg tablet TAKE 1 TABLET BY MOUTH  DAILY AT BEDTIME 90 tablet 3    metoprolol tartrate (LOPRESSOR) 50 mg tablet TAKE 1 TABLET BY MOUTH  TWICE DAILY 180 tablet 3    nitroglycerin (Nitrostat) 0 4 mg SL tablet Place 1 tablet (0 4 mg total) under the tongue every 5 (five) minutes as needed for chest pain 25 tablet 0    rosuvastatin (CRESTOR) 20 MG tablet TAKE 1 TABLET BY MOUTH  DAILY AT BEDTIME 90 tablet 3    tadalafil (Cialis) 20 MG tablet Take 1 tablet (20 mg total) by mouth daily as needed for erectile dysfunction 20 tablet 5    triamcinolone (KENALOG) 0 1 % ointment Apply topically 2 (two) times a day , short term, hands 80 g 5     No current facility-administered medications for this visit  Allergies   Allergen Reactions    Lisinopril Cough      Immunizations:     Immunization History   Administered Date(s) Administered    COVID-19 PFIZER VACCINE 0 3 ML IM 04/14/2021, 05/06/2021      Health Maintenance:         Topic Date Due    Hepatitis C Screening  Never done    Colorectal Cancer Screening  09/10/2028         Topic Date Due    Pneumococcal Vaccine: 65+ Years (1 - PCV) Never done    COVID-19 Vaccine (3 - Booster for [employer] series) 10/06/2021      Medicare Health Risk Assessment:     /82   Pulse 55   Temp (!) 96 1 °F (35 6 °C)   Resp 16   Ht 5' 9" (1 753 m)   Wt 81 6 kg (179 lb 12 8 oz)   SpO2 99%   BMI 26 55 kg/m²      Krystle Major is here for his Subsequent Wellness visit  Last Medicare Wellness visit information reviewed, patient interviewed and updates made to the record today  Health Risk Assessment:   Patient rates overall health as excellent  Patient feels that their physical health rating is same  Patient is very satisfied with their life  Eyesight was rated as same  Hearing was rated as same  Patient feels that their emotional and mental health rating is slightly better  Patients states they are never, rarely angry  Patient states they are never, rarely unusually tired/fatigued  Pain experienced in the last 7 days has been none   Patient states that he has experienced no weight loss or gain in last 6 months  Depression Screening:   PHQ-2 Score: 0      Fall Risk Screening: In the past year, patient has experienced: no history of falling in past year      Home Safety:  Patient does not have trouble with stairs inside or outside of their home  Patient has working smoke alarms and has no working carbon monoxide detector  Home safety hazards include: none  Nutrition:   Current diet is Low Cholesterol, Low Saturated Fat and No Added Salt  Medications:   Patient is not currently taking any over-the-counter supplements  Patient is able to manage medications  Activities of Daily Living (ADLs)/Instrumental Activities of Daily Living (IADLs):   Walk and transfer into and out of bed and chair?: Yes  Dress and groom yourself?: Yes    Bathe or shower yourself?: Yes    Feed yourself? Yes  Do your laundry/housekeeping?: Yes  Manage your money, pay your bills and track your expenses?: Yes  Make your own meals?: Yes    Do your own shopping?: Yes    Previous Hospitalizations:   Any hospitalizations or ED visits within the last 12 months?: No      Advance Care Planning:   Living will: No    Durable POA for healthcare:  Yes    Advanced directive: Yes    End of Life Decisions reviewed with patient: No      Cognitive Screening:   Provider or family/friend/caregiver concerned regarding cognition?: No    PREVENTIVE SCREENINGS      Cardiovascular Screening:    General: Screening Not Indicated and History Lipid Disorder      Diabetes Screening:     General: Screening Current      Colorectal Cancer Screening:     General: Screening Current      Prostate Cancer Screening:    General: Screening Current      Osteoporosis Screening:    General: Screening Not Indicated      Abdominal Aortic Aneurysm (AAA) Screening:    Risk factors include: age between 73-67 yo and tobacco use        Lung Cancer Screening:     General: Screening Not Indicated      Hepatitis C Screening:    General: Risks and Benefits Discussed    Hep C Screening Accepted: No     Screening, Brief Intervention, and Referral to Treatment (SBIRT)    Screening  Typical number of drinks in a day: 1  Typical number of drinks in a week: 1  Interpretation: Low risk drinking behavior  Single Item Drug Screening:  How often have you used an illegal drug (including marijuana) or a prescription medication for non-medical reasons in the past year? never    Single Item Drug Screen Score: 0  Interpretation: Negative screen for possible drug use disorder    Other Counseling Topics:   Car/seat belt/driving safety and regular weightbearing exercise         Pranay Hudson, DO

## 2022-10-12 PROBLEM — Z12.5 PROSTATE CANCER SCREENING: Status: RESOLVED | Noted: 2021-11-12 | Resolved: 2022-10-12

## 2022-10-12 PROBLEM — Z13.1 SCREENING FOR DIABETES MELLITUS (DM): Status: RESOLVED | Noted: 2021-11-12 | Resolved: 2022-10-12

## 2022-10-19 DIAGNOSIS — I10 BENIGN ESSENTIAL HYPERTENSION: ICD-10-CM

## 2022-10-19 DIAGNOSIS — E78.5 DYSLIPIDEMIA: ICD-10-CM

## 2022-10-20 RX ORDER — ROSUVASTATIN CALCIUM 20 MG/1
TABLET, COATED ORAL
Qty: 90 TABLET | Refills: 3 | Status: SHIPPED | OUTPATIENT
Start: 2022-10-20

## 2022-10-20 RX ORDER — AMLODIPINE BESYLATE 5 MG/1
TABLET ORAL
Qty: 90 TABLET | Refills: 3 | Status: SHIPPED | OUTPATIENT
Start: 2022-10-20

## 2022-11-17 ENCOUNTER — OFFICE VISIT (OUTPATIENT)
Dept: CARDIOLOGY CLINIC | Facility: CLINIC | Age: 72
End: 2022-11-17

## 2022-11-17 VITALS
TEMPERATURE: 97 F | HEART RATE: 65 BPM | WEIGHT: 175 LBS | BODY MASS INDEX: 25.92 KG/M2 | DIASTOLIC BLOOD PRESSURE: 70 MMHG | OXYGEN SATURATION: 97 % | SYSTOLIC BLOOD PRESSURE: 142 MMHG | HEIGHT: 69 IN

## 2022-11-17 DIAGNOSIS — Z98.61 HISTORY OF PERCUTANEOUS CORONARY INTERVENTION: ICD-10-CM

## 2022-11-17 DIAGNOSIS — I25.10 CORONARY ARTERY DISEASE INVOLVING NATIVE CORONARY ARTERY OF NATIVE HEART WITHOUT ANGINA PECTORIS: ICD-10-CM

## 2022-11-17 DIAGNOSIS — E78.5 DYSLIPIDEMIA: ICD-10-CM

## 2022-11-17 DIAGNOSIS — R01.1 CARDIAC MURMUR: ICD-10-CM

## 2022-11-17 DIAGNOSIS — I10 BENIGN ESSENTIAL HYPERTENSION: ICD-10-CM

## 2022-11-17 RX ORDER — AMLODIPINE BESYLATE 5 MG/1
5 TABLET ORAL
Qty: 90 TABLET | Refills: 3 | Status: SHIPPED | OUTPATIENT
Start: 2022-11-17

## 2022-11-17 RX ORDER — ROSUVASTATIN CALCIUM 20 MG/1
20 TABLET, COATED ORAL
Qty: 90 TABLET | Refills: 3 | Status: SHIPPED | OUTPATIENT
Start: 2022-11-17

## 2022-11-17 RX ORDER — METOPROLOL TARTRATE 50 MG/1
50 TABLET, FILM COATED ORAL 2 TIMES DAILY
Qty: 180 TABLET | Refills: 3 | Status: SHIPPED | OUTPATIENT
Start: 2022-11-17

## 2022-11-17 NOTE — PROGRESS NOTES
Progress note - Cardiology Office   [facility]  Louise Lehman 67 y o  male MRN: [id number]  : 1950  Unit/Bed#:  Encounter: [id number]      Assessment:     1  Coronary artery disease involving native coronary artery of native heart without angina pectoris    2  Cardiac murmur    3  Dyslipidemia    4  Benign essential hypertension    5  History of percutaneous coronary intervention        Discussion summary and Plan:     1  Coronary artery disease  Patient had history of coronary artery disease with stenting done of right coronary artery in   Does not remember about other arteries  His exercise stress test he could not get the target heart rate he did a nuclear stress test 2021 which was nonischemic with normal LV systolic function      2  Benign essential hypertension  Borderline acceptable continue metoprolol and amlodipine  Blood pressure has been acceptable  2022 it has been acceptable      3  Dyslipidemia  On statins  Continue Crestor 20 mg LDL was 87       4   Cardiac murmur  Echo Doppler shows normal LV systolic function mild thickened aortic valve no significant other disease noted  No change in clinical exam     5  History of erectile dysfunction  On Cialis    Plan  Continue current Rx follow-up 6 months  Patient  was advised and educated to call our office  immediately or go to the nearest hospital if  patient has any new symptoms of chest pain/shortness of breath, near-syncope, syncope, light headedness sustained palpitations  or any other cardiovascular symptoms before their scheduled follow-up appointment  Office phone number was provided #[phone]  Thank you for your consultation  If you have any question please call me at [phone]    Counseling :  A description of the counseling  Goals and Barriers    Patient's ability to self care: Yes  Medication side effect reviewed with patient in detail and all their questions answered to their satisfaction  Primary Care Physician : Pramod Lu DO      HPI :     Miguel Angel Camacho is a 67y o  year old male who was referred by primary care doctor for coronary artery disease  Patient who has medical history significant for coronary artery disease status post angioplasty of  Right coronary artery, hypertension, dyslipidemia who used to follow at [facility] now came to follow follow-up with us for his cardiovascular issues  Patient has not seen a cardiologist since 2019  He used to follow up with [facility]  He had a stenting done of his RCA in 2009  It was done for the symptoms of exertional shortness of breath and he underwent cardiac catheterization found to have RCA stenosis  He had a strong family history of heart problem multiple family members have history of coronary artery disease and has history of bypass surgery  He also himself has impaired glucose metabolism  He has no recent surgical history recently he was found to have thyroid nodule which is followed by the medical MD      He is  he lives with his wife  He does not smoke  Used to smoke quit 20 years ago    08/27/2021  Above reviewed  Patient came for follow-up he is doing well he has no new complaints  Denies any chest pain any shortness of breath  His cardiac workup reviewed  His stress test was equivocal because he could not get the target heart rate he only got 73% maximum predicted heart rate  Echo shows normal LV systolic function  He has no symptoms we have order nuclear stress test which he has not done yet  His cholesterol do acceptable but LDL is still not at goal is around 97  He wants to try diet for some months and if it does not help he is willing to increase his statin therapy  He had history of coronary artery disease with angioplasty of LAD and unknown vessel otherwise he is doing well    No nausea no vomiting no fever no chills blood pressure is acceptable though he had mild hypertensive response to exercise  His medications reviewed no other cardiovascular complaint at this time  11/17/2022    Above reviewed  Patient came for follow-up  Patient has medical history significant for equivocal stress test as he could only get 73% maximum predicted heart rate, coronary artery disease status post angioplasty of his RCA in 2009, strong family history of heart problems with multiple family member having history of coronary artery disease, dyslipidemia  Patient was later on scheduled for nuclear stress test in September 2021 which was normal   He is compliant with his medication  Never needed to use nitro  His currently on amlodipine 5 mg daily along with metoprolol Crestor and clopidogrel  His blood test done in March 2022 has been acceptable except for mildly elevated triglyceride levels  Review of Systems   Constitutional: Negative for activity change, chills, diaphoresis, fever and unexpected weight change  HENT: Negative for congestion  Eyes: Negative for discharge and redness  Respiratory: Negative for cough, chest tightness, shortness of breath and wheezing  Cardiovascular: Negative  Negative for chest pain, palpitations and leg swelling  Gastrointestinal: Negative for abdominal pain, diarrhea and nausea  Endocrine: Negative  Genitourinary: Negative for decreased urine volume and urgency  Musculoskeletal: Negative  Negative for arthralgias, back pain and gait problem  Skin: Negative for rash and wound  Allergic/Immunologic: Negative  Neurological: Negative for dizziness, seizures, syncope, weakness, light-headedness and headaches  Hematological: Negative  Psychiatric/Behavioral: Negative for agitation and confusion  The patient is not nervous/anxious          Historical Information   Past Medical History:   Diagnosis Date   • Chest pain     [id number]  last assessed   • Congenital anomaly of coronary artery [id number]  last assessed   • 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)     [id number]  last assessed   • Fracture     right lower leg   • Hypertension    • Skin neoplasm     [id number] resolved   • Wears glasses      Past Surgical History:   Procedure Laterality Date   • ANGIOPLASTY     • COLONOSCOPY     • COLONOSCOPY N/A 9/10/2018    Procedure: COLONOSCOPY;  Surgeon: Jim Velazquez MD;  Location: [facility];   Service: Gastroenterology   • CORONARY ANGIOPLASTY WITH STENT PLACEMENT      rca stent   • FRACTURE SURGERY Right     tibia,fibula fx repair   • TONSILLECTOMY      age 10   • [de-identified] THYROID BIOPSY  2019     Social History     Substance and Sexual Activity   Alcohol Use Yes    Comment: occ     Social History     Substance and Sexual Activity   Drug Use No     Social History     Tobacco Use   Smoking Status Former   • Types: Cigarettes   • Quit date:    • Years since quittin 9   Smokeless Tobacco Never     Family History:   Family History   Problem Relation Age of Onset   • Coronary artery disease Father    • Heart disease Father    • Hypertension Father    • Macular degeneration Father    • Diabetes Sister    • Heart disease Brother         CABG age 55,       Meds/Allergies     Allergies   Allergen Reactions   • Lisinopril Cough       Current Outpatient Medications:   •  amLODIPine (NORVASC) 5 mg tablet, Take 1 tablet (5 mg total) by mouth daily at bedtime, Disp: 90 tablet, Rfl: 3  •  clopidogrel (PLAVIX) 75 mg tablet, TAKE 1 TABLET BY MOUTH  DAILY AT BEDTIME, Disp: 30 tablet, Rfl: 0  •  metoprolol tartrate (LOPRESSOR) 50 mg tablet, Take 1 tablet (50 mg total) by mouth 2 (two) times a day, Disp: 180 tablet, Rfl: 3  •  rosuvastatin (CRESTOR) 20 MG tablet, Take 1 tablet (20 mg total) by mouth daily at bedtime, Disp: 90 tablet, Rfl: 3  •  tadalafil (Cialis) 20 MG tablet, Take 1 tablet (20 mg total) by mouth daily as needed for erectile dysfunction, Disp: 20 tablet, Rfl: 5  • triamcinolone (KENALOG) 0 1 % ointment, Apply topically 2 (two) times a day , short term, hands, Disp: 80 g, Rfl: 5  •  nitroglycerin (Nitrostat) 0 4 mg SL tablet, Place 1 tablet (0 4 mg total) under the tongue every 5 (five) minutes as needed for chest pain (Patient not taking: Reported on 11/17/2022), Disp: 25 tablet, Rfl: 0    Vitals: Blood pressure 142/70, pulse 65, temperature (!) 97 °F (36 1 °C), height 5' 9" (1 753 m), weight 79 4 kg (175 lb), SpO2 97 %  ?  Body mass index is 25 84 kg/m²  Wt Readings from Last 3 Encounters:   11/17/22 79 4 kg (175 lb)   05/13/22 81 6 kg (179 lb 12 8 oz)   11/12/21 81 4 kg (179 lb 6 4 oz)     Vitals:    11/17/22 1333   Weight: 79 4 kg (175 lb)     BP Readings from Last 3 Encounters:   11/17/22 142/70   05/13/22 136/82   11/12/21 138/68         Physical Exam  Constitutional:       General: He is not in acute distress  Appearance: He is well-developed  He is not diaphoretic  Neck:      Thyroid: No thyromegaly  Vascular: No JVD  Trachea: No tracheal deviation  Comments: No carotid bruit  Cardiovascular:      Rate and Rhythm: Normal rate and regular rhythm  Heart sounds: S1 normal and S2 normal  Heart sounds not distant  Murmur heard  Systolic (ejection) murmur is present with a grade of 2/6  No friction rub  No gallop  No S3 or S4 sounds  Pulmonary:      Effort: Pulmonary effort is normal  No respiratory distress  Breath sounds: Normal breath sounds  No wheezing or rales  Comments: Lungs are clear to auscultation  Chest:      Chest wall: No tenderness  Abdominal:      General: Bowel sounds are normal  There is no distension  Palpations: Abdomen is soft  Tenderness: There is no abdominal tenderness  Musculoskeletal:         General: No deformity  Cervical back: Neck supple  Comments: No lower extremity edema   Skin:     General: Skin is warm and dry  Coloration: Skin is not pale  Findings: No rash  Neurological:      Mental Status: He is alert and oriented to person, place, and time  Psychiatric:         Behavior: Behavior normal          Judgment: Judgment normal              Diagnostic Studies Review Cardio:     exercise stress test   Exercise stress test done 06/18/2021 equivocal   Patient exercised for 8 minutes achieved workload of 77 3 metabolic equivalents  It was only 73% maximum predicted heart rate  And he has mild hypertensive response to exercise  He was recommended nuclear stress test which he did not due yet  Nuclear stress test done in September 2021 shows patient's EF is 57%, patient id lower level exercise  No ischemia was noted      Echo Doppler  Echo Doppler done 06/18/2020 shows normal LV systolic function  EF 55 60%, no significant valvular disease  EKG:  Twelve lead EKG done 04/12/2021 shows sinus rhythm heart rate 55 beats per minute no significant abnormality      Twelve lead EKG 11/17/2022 shows normal sinus rhythm heart rate 65 beats per minute no change from previous EKG    Lab Review   Lab Results   Component Value Date    WBC 7 8 03/11/2021    HGB 15 3 03/11/2021    HCT 44 8 03/11/2021    MCV 86 03/11/2021    RDW 13 4 03/11/2021     03/11/2021     BMP:  Lab Results   Component Value Date    SODIUM 139 03/11/2022    K 4 7 03/11/2022     03/11/2022    CO2 26 03/11/2022    BUN 13 03/11/2022    CREATININE 1 12 03/11/2022    GLUC 119 (H) 03/11/2022    CALCIUM 8 9 11/13/2017    EGFR 70 03/11/2022     LFT:  Lab Results   Component Value Date    AST 19 03/11/2022    ALT 21 03/11/2022    ALKPHOS 40 11/13/2017    TP 6 5 03/11/2022    ALB 4 6 03/11/2022        Lab Results   Component Value Date    HGBA1C 6 3 (H) 03/11/2022     Lipid Profile:   Lab Results   Component Value Date    CHOLESTEROL 159 03/11/2022    HDL 41 03/11/2022    LDLCALC 87 03/11/2022    TRIG 184 (H) 03/11/2022     Lab Results   Component Value Date    CHOLESTEROL 159 03/11/2022    CHOLESTEROL 172 03/11/2021         Dr Mindy Ku MD [facility]      "This note has been constructed using a voice recognition system  Therefore there may be syntax, spelling, and/or grammatical errors   Please call if you have any questions  "

## 2022-12-16 DIAGNOSIS — I25.10 CORONARY ARTERY DISEASE INVOLVING NATIVE CORONARY ARTERY OF NATIVE HEART WITHOUT ANGINA PECTORIS: ICD-10-CM

## 2022-12-19 RX ORDER — CLOPIDOGREL BISULFATE 75 MG/1
TABLET ORAL
Qty: 30 TABLET | Refills: 11 | Status: SHIPPED | OUTPATIENT
Start: 2022-12-19

## 2022-12-26 ENCOUNTER — NURSE TRIAGE (OUTPATIENT)
Dept: OTHER | Facility: OTHER | Age: 72
End: 2022-12-26

## 2022-12-26 DIAGNOSIS — U07.1 COVID-19: Primary | ICD-10-CM

## 2022-12-26 RX ORDER — NIRMATRELVIR AND RITONAVIR 300-100 MG
3 KIT ORAL 2 TIMES DAILY
Qty: 30 TABLET | Refills: 0 | Status: SHIPPED | OUTPATIENT
Start: 2022-12-26 | End: 2022-12-27 | Stop reason: SDUPTHER

## 2022-12-26 NOTE — TELEPHONE ENCOUNTER
Regardin/2 Ken / covid+/treatment  ----- Message from [employer] sent at 2022 12:32 PM EST -----  "Im calling about my wife and I, we tested positive today for covid and we both are 67years old and I was wondering if we can get paxlovid sent into [facility]  "

## 2022-12-26 NOTE — TELEPHONE ENCOUNTER
12/26/2022 2:10 PM called Mich Villanueva regarding his COVID 19 symptoms  Current symptoms are mild    He would like to start Paxlovid      Symptoms started on 12/24/22  Tested positive on 12/26/22    Emergency use authorization discussed  Risks and benefits of medication discussed  paxlovid prescribed      Message complete  Geetha Ngo DO

## 2022-12-26 NOTE — TELEPHONE ENCOUNTER
1  Were you within 6 feet or less, for up to 15 minutes or more with a person that has a confirmed COVID-19 test? Patient   2  What was the date of your exposure? + 12/26/22  3  Are you experiencing any symptoms attributed to the virus?  (Assess for SOB, cough, fever, difficulty breathing) Runny nose,   4  HIGH RISK: Do you have any history heart or lung conditions, weakened immune system, diabetes, Asthma, CHF, HIV, COPD, Chemo, renal failure, sickle cell, etc? HTN, stent   5  PREGNANCY: Are you pregnant or did you recently give birth? N/A  6  VACCINE: "Have you gotten the COVID-19 vaccine?" If Yes ask: "Which one, how many shots, when did you get it?" 2    Reason for Disposition  • [1] COVID-19 diagnosed by positive lab test (e g , PCR, rapid self-test kit) AND [2] mild symptoms (e g , cough, fever, others) AND [9] no complications or SOB    Protocols used: CORONAVIRUS (COVID-19) DIAGNOSED OR SUSPECTED-ADULT-[place]    Patient requesting Paxlovid  On call paged and will call patient to advise

## 2022-12-27 ENCOUNTER — TELEPHONE (OUTPATIENT)
Dept: FAMILY MEDICINE CLINIC | Facility: CLINIC | Age: 72
End: 2022-12-27

## 2022-12-27 DIAGNOSIS — U07.1 COVID-19: ICD-10-CM

## 2022-12-27 RX ORDER — NIRMATRELVIR AND RITONAVIR 300-100 MG
3 KIT ORAL 2 TIMES DAILY
Qty: 30 TABLET | Refills: 0 | Status: SHIPPED | OUTPATIENT
Start: 2022-12-27 | End: 2023-01-01

## 2022-12-27 NOTE — TELEPHONE ENCOUNTER
Patient came in and stated Dr Tavon Martínez was prescribing Paxlovid for him  I see that it was sent but [facility] is stating that they did not receive it  Please resend to Jose Valencia

## 2023-01-31 ENCOUNTER — NURSE TRIAGE (OUTPATIENT)
Dept: OTHER | Facility: OTHER | Age: 73
End: 2023-01-31

## 2023-01-31 ENCOUNTER — TELEPHONE (OUTPATIENT)
Dept: FAMILY MEDICINE CLINIC | Facility: CLINIC | Age: 73
End: 2023-01-31

## 2023-01-31 ENCOUNTER — OFFICE VISIT (OUTPATIENT)
Dept: FAMILY MEDICINE CLINIC | Facility: CLINIC | Age: 73
End: 2023-01-31

## 2023-01-31 VITALS
HEIGHT: 69 IN | WEIGHT: 179 LBS | HEART RATE: 61 BPM | TEMPERATURE: 96.8 F | BODY MASS INDEX: 26.51 KG/M2 | DIASTOLIC BLOOD PRESSURE: 84 MMHG | SYSTOLIC BLOOD PRESSURE: 150 MMHG | RESPIRATION RATE: 16 BRPM

## 2023-01-31 DIAGNOSIS — R21 RASH: Primary | ICD-10-CM
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
## 2023-01-31 DIAGNOSIS — S50.861A TICK BITE OF RIGHT FOREARM, INITIAL ENCOUNTER: ICD-10-CM

## 2023-01-31 RX ORDER — DOXYCYCLINE 100 MG/1
CAPSULE ORAL
Qty: 2 CAPSULE | Refills: 0 | Status: SHIPPED | OUTPATIENT
Start: 2023-01-31 | End: 2023-02-02

## 2023-01-31 NOTE — TELEPHONE ENCOUNTER
Patient spoke with pcp office and is scheduled for an appointment today  Reason for Disposition  • Caller has already spoken with another triager or PCP AND has further questions AND triager able to answer questions      Protocols used: NO CONTACT OR DUPLICATE CONTACT CALL-ADULT-

## 2023-01-31 NOTE — TELEPHONE ENCOUNTER
Regarding: tick bite  ----- Message from Jerry Cast sent at 1/31/2023  8:25 AM EST -----  " I have a tick bite and now it has the bullseye around it   I need to be seen ASAP "

## 2023-01-31 NOTE — PATIENT INSTRUCTIONS
Tick Bite   AMBULATORY CARE:   What you need to know about a tick bite:  Ticks need to be removed quickly  Most tick bites are not dangerous, but ticks can pass disease or infection when they bite  Diseases include Lyme disease, babesiosis, tularemia, and [place] Spotted Fever  Signs and symptoms of a tick bite  may develop right away, or weeks or even months after a bite  You may have redness, pain, itching, and swelling near the bite area  Blisters may also develop  You may develop tick paralysis, a temporary condition that causes problems with leg movement  A disease from a tick bite may cause a fever, rash, body aches, or breathing problems  Seek care immediately if:   You have trouble walking or moving your legs  You have joint pain, muscle pain, or muscle weakness within 1 month of a tick bite  You have a fever, chills, headache, or rash  Call your doctor if:   You cannot remove the tick  The tick's head is stuck in your skin  You have questions or concerns about your condition or care  Treatment:  Treatment for a disease passed during the bite depends on the disease  You may only need medicines to lower a fever or fight a bacterial infection  More serious diseases can cause conditions such as breathing problems that need to be treated in a hospital  The following can help treat symptoms at the bite area:  Apply ice  on your bite for 15 to 20 minutes every hour or as directed  Use an ice pack, or put crushed ice in a plastic bag  Cover it with a towel before you apply it to your skin  Ice helps prevent tissue damage and decreases swelling and pain  Medicines  help decrease pain, redness, itching, and swelling  You may also need medicine to prevent or fight a bacterial infection  These medicines may be given as a cream, lotion, or pill  How to remove a tick:  Remove the tick as soon as possible to help prevent disease or infection   You are less likely to get sick from a tick bite if you remove the tick within 24 hours  Do not use petroleum jelly, nail polish, rubbing alcohol, or heat  These do not work and may be dangerous  Do the following to remove a tick:  First, try a soapy cotton ball  Soak a cotton ball in liquid soap  Cover the tick with the cotton ball for 30 seconds  The tick may come off with the cotton ball when you pull it away  Use tweezers if the soapy cotton ball does not work  Grasp the tick as close to your skin as possible  Pull the tick straight up and out  Do not touch the tick with your bare hands  Do not twist or jerk the tick suddenly, because this may break off the tick's head or mouth parts  Do not leave any part of the tick in your skin  Do not crush or squeeze the tick since its body may be infected with germs  Flush the tick down the toilet  After the tick is removed, clean the area of the bite with rubbing alcohol  Then wash your hands with soap and water  Prevent a tick bite:  Ticks live in areas covered by brush and grass  They may even be found in your lawn if you live in certain areas  Outdoor pets can carry ticks inside the house  Ticks can grab onto you or your clothes when you walk by grass or brush  If you go into areas that contain many trees, tall grasses, and underbrush, do the following:  Wear light colored pants and a long-sleeved shirt  Tuck your pants into your socks or boots  Tuck in your shirt  Wear sleeves that fit close to the skin at your wrists and neck  This will help prevent ticks from crawling through gaps in your clothing and onto your skin  Wear a hat in areas with trees  Apply insect repellant on your skin  The insect repellant should contain DEET  Do not put insect repellant on skin that is cut, scratched, or irritated  Always use soap and water to wash the insect repellant off as soon as possible once you are indoors  Do not apply insect repellant on your child's face or hands      Spray insect repellant onto your clothes  Use permethrin spray  This spray kills ticks that crawl on your clothing  Be sure to spray the tops of your boots, bottom of pant legs, and sleeve cuffs  As soon as possible, wash and dry clothing in hot water and high heat  Check your clothing, hair, and skin for ticks  Shower within 2 hours of coming indoors  Carefully check the hairline, armpits, neck, and waist  Check your pets and children for ticks  Remove ticks from pets the same way as you remove them from people  Decrease the risk for ticks in your yard  Ticks like to live in shady, moist areas  Carilyn Cook your lawn regularly to keep the grass short  Trim the grass around birdbaths and fences  Cut branches that are overgrown and take them out of the yard  Clear out leaf piles  Armida Finder firewood in a dry, deepa area  Follow up with your doctor as directed:  Write down your questions so you remember to ask them during your visits  © Copyright [employer] 2022 Information is for End User's use only and may not be sold, redistributed or otherwise used for commercial purposes  All illustrations and images included in CareNotes® are the copyrighted property of A D A M [employer]  or Demetrio Garcia   The above information is an  only  It is not intended as medical advice for individual conditions or treatments  Talk to your doctor, nurse or pharmacist before following any medical regimen to see if it is safe and effective for you

## 2023-01-31 NOTE — PROGRESS NOTES
Assessment/Plan:  Patient came within less than 72 hours for tick removal and will do doxy 200mg prophylactic treatment for prevention of lyme and discussed lyme disease symptoms and will follow back for any issues  1  Rash  -     doxycycline monohydrate (MONODOX) 100 mg capsule; Take 2 capsules together, one time    2  Tick bite of right forearm, initial encounter  -     doxycycline monohydrate (MONODOX) 100 mg capsule; Take 2 capsules together, one time        Patient Instructions: Take medication with food  It is important that you take the entire course of antibiotics prescribed  May also take a probiotic of your choice to maintain healthy GI khanh  Can take some probiotic and yogurt with the medication  Supportive care discussed and advised  Advised to RTO for any worsening and no improvement  Follow up for no improvement and worsening of conditions  Patient advised and educated when to see immediate medical care  Return if symptoms worsen or fail to improve  Future Appointments   Date Time Provider Sarah Brody   4/17/2023 10:30 AM DO CAMILO [facility]           Subjective:      Patient ID: Floridalma Simpson is a 67 y o  male  Chief Complaint   Patient presents with   • Tick Bite     Took tick out of right arm on Sunday morning, has a dante with red ring around it where tick was removed nm  lpn         Vitals:  /84   Pulse 61   Temp (!) 96 8 °F (36 °C)   Resp 16   Ht 5' 9" (1 753 m)   Wt 81 2 kg (179 lb)   BMI 26 43 kg/m²     HPI  Patient stated that pulled tick bite from her right arm on 1/29/2023 at 4 am  Denies fever, chills, bodyaches, arthralgia and headache  Stated that arm area is less sore now           PHQ-2/9 Depression Screening    Little interest or pleasure in doing things: 0 - not at all  Feeling down, depressed, or hopeless: 0 - not at all  PHQ-2 Score: 0  PHQ-2 Interpretation: Negative depression screen             The following portions of the patient's history were reviewed and updated as appropriate: allergies, current medications, past family history, past medical history, past social history, past surgical history and problem list       Review of Systems   HENT: Negative  Respiratory: Negative  Cardiovascular: Negative  Gastrointestinal: Negative  Skin: Positive for rash  As noted in HPI           Objective:    Social History     Tobacco Use   Smoking Status Former   • Types: Cigarettes   • Quit date:    • Years since quittin 1   Smokeless Tobacco Never       Allergies: Allergies   Allergen Reactions   • Lisinopril Cough         Current Outpatient Medications   Medication Sig Dispense Refill   • amLODIPine (NORVASC) 5 mg tablet Take 1 tablet (5 mg total) by mouth daily at bedtime 90 tablet 3   • clopidogrel (PLAVIX) 75 mg tablet TAKE 1 TABLET BY MOUTH DAILY AT  BEDTIME 30 tablet 11   • doxycycline monohydrate (MONODOX) 100 mg capsule Take 2 capsules together, one time 2 capsule 0   • metoprolol tartrate (LOPRESSOR) 50 mg tablet Take 1 tablet (50 mg total) by mouth 2 (two) times a day 180 tablet 3   • nitroglycerin (Nitrostat) 0 4 mg SL tablet Place 1 tablet (0 4 mg total) under the tongue every 5 (five) minutes as needed for chest pain 25 tablet 0   • rosuvastatin (CRESTOR) 20 MG tablet Take 1 tablet (20 mg total) by mouth daily at bedtime 90 tablet 3   • tadalafil (Cialis) 20 MG tablet Take 1 tablet (20 mg total) by mouth daily as needed for erectile dysfunction 20 tablet 5   • triamcinolone (KENALOG) 0 1 % ointment Apply topically 2 (two) times a day , short term, hands 80 g 5     No current facility-administered medications for this visit  Physical Exam  Constitutional:       Appearance: Normal appearance  HENT:      Head: Normocephalic  Nose: Nose normal    Eyes:      Conjunctiva/sclera: Conjunctivae normal    Cardiovascular:      Rate and Rhythm: Normal rate and regular rhythm        Heart sounds: Normal heart sounds  Pulmonary:      Effort: Pulmonary effort is normal       Breath sounds: Normal breath sounds  Musculoskeletal:         General: No swelling or tenderness  Normal range of motion  Skin:     General: Skin is warm and dry  Findings: Rash present  Comments: Tiny bite dante noted on right forearm with scab on and no bull's eye noted   Neurological:      Mental Status: He is alert and oriented to person, place, and time  Psychiatric:         Mood and Affect: Mood normal          Behavior: Behavior normal          Thought Content:  Thought content normal          Judgment: Judgment normal                      NOAM Beard

## 2023-01-31 NOTE — TELEPHONE ENCOUNTER
Pt has an apt coming up in April for an AWV and would like BW prior, Please call when complete and ready for

## 2023-02-01 DIAGNOSIS — I10 BENIGN ESSENTIAL HYPERTENSION: Primary | ICD-10-CM

## 2023-02-01 DIAGNOSIS — E78.5 HYPERLIPIDEMIA LDL GOAL <100: ICD-10-CM

## 2023-02-01 DIAGNOSIS — Z12.5 PROSTATE CANCER SCREENING: ICD-10-CM

## 2023-02-01 DIAGNOSIS — R73.03 PREDIABETES: ICD-10-CM

## 2023-03-17 LAB
ALBUMIN SERPL-MCNC: 4.4 G/DL (ref 3.7–4.7)
ALBUMIN/GLOB SERPL: 2.1 {RATIO} (ref 1.2–2.2)
ALP SERPL-CCNC: 52 IU/L (ref 44–121)
ALT SERPL-CCNC: 21 IU/L (ref 0–44)
AST SERPL-CCNC: 21 IU/L (ref 0–40)
BILIRUB SERPL-MCNC: 0.5 MG/DL (ref 0–1.2)
BUN SERPL-MCNC: 13 MG/DL (ref 8–27)
BUN/CREAT SERPL: 12 (ref 10–24)
CALCIUM SERPL-MCNC: 9 MG/DL (ref 8.6–10.2)
CHLORIDE SERPL-SCNC: 100 MMOL/L (ref 96–106)
CHOLEST SERPL-MCNC: 166 MG/DL (ref 100–199)
CO2 SERPL-SCNC: 27 MMOL/L (ref 20–29)
CREAT SERPL-MCNC: 1.11 MG/DL (ref 0.76–1.27)
EGFR: 70 ML/MIN/1.73
GLOBULIN SER-MCNC: 2.1 G/DL (ref 1.5–4.5)
GLUCOSE SERPL-MCNC: 105 MG/DL (ref 70–99)
HBA1C MFR BLD: 6.3 % (ref 4.8–5.6)
HDLC SERPL-MCNC: 48 MG/DL
LDLC SERPL CALC-MCNC: 91 MG/DL (ref 0–99)
MICRODELETION SYND BLD/T FISH: NORMAL
POTASSIUM SERPL-SCNC: 4.3 MMOL/L (ref 3.5–5.2)
PROT SERPL-MCNC: 6.5 G/DL (ref 6–8.5)
PSA SERPL-MCNC: 1.5 NG/ML (ref 0–4)
SODIUM SERPL-SCNC: 140 MMOL/L (ref 134–144)
TRIGL SERPL-MCNC: 157 MG/DL (ref 0–149)

## 2023-05-16 ENCOUNTER — OFFICE VISIT (OUTPATIENT)
Dept: FAMILY MEDICINE CLINIC | Facility: CLINIC | Age: 73
End: 2023-05-16

## 2023-05-16 VITALS
RESPIRATION RATE: 18 BRPM | TEMPERATURE: 98.1 F | HEART RATE: 70 BPM | DIASTOLIC BLOOD PRESSURE: 80 MMHG | SYSTOLIC BLOOD PRESSURE: 148 MMHG | BODY MASS INDEX: 26.51 KG/M2 | WEIGHT: 179 LBS | HEIGHT: 69 IN

## 2023-05-16 DIAGNOSIS — Z00.00 MEDICARE ANNUAL WELLNESS VISIT, SUBSEQUENT: Primary | ICD-10-CM

## 2023-05-16 DIAGNOSIS — N52.9 ORGANIC IMPOTENCE: ICD-10-CM

## 2023-05-16 DIAGNOSIS — I10 BENIGN ESSENTIAL HYPERTENSION: ICD-10-CM

## 2023-05-16 DIAGNOSIS — L20.84 INTRINSIC ECZEMA: ICD-10-CM

## 2023-05-16 RX ORDER — AMLODIPINE BESYLATE 10 MG/1
10 TABLET ORAL
Qty: 90 TABLET | Refills: 0
Start: 2023-05-16

## 2023-05-16 RX ORDER — TADALAFIL 20 MG/1
20 TABLET ORAL DAILY PRN
Qty: 30 TABLET | Refills: 5 | Status: SHIPPED | OUTPATIENT
Start: 2023-05-16

## 2023-05-16 NOTE — PATIENT INSTRUCTIONS
Since your blood pressure is high, please increase the amlodipine from 5mg/d to 10mg/d and see how your blood pressure is at your upcoming visit with your Cardiologist   Medicare Preventive Visit Patient Instructions  Thank you for completing your Welcome to Medicare Visit or Medicare Annual Wellness Visit today  Your next wellness visit will be due in one year (5/17/2024)  The screening/preventive services that you may require over the next 5-10 years are detailed below  Some tests may not apply to you based off risk factors and/or age  Screening tests ordered at today's visit but not completed yet may show as past due  Also, please note that scanned in results may not display below  Preventive Screenings:  Service Recommendations Previous Testing/Comments   Colorectal Cancer Screening  · Colonoscopy    · Fecal Occult Blood Test (FOBT)/Fecal Immunochemical Test (FIT)  · Fecal DNA/Cologuard Test  · Flexible Sigmoidoscopy Age: 39-70 years old   Colonoscopy: every 10 years (May be performed more frequently if at higher risk)  OR  FOBT/FIT: every 1 year  OR  Cologuard: every 3 years  OR  Sigmoidoscopy: every 5 years  Screening may be recommended earlier than age 39 if at higher risk for colorectal cancer  Also, an individualized decision between you and your healthcare provider will decide whether screening between the ages of 74-80 would be appropriate   Colonoscopy: 09/10/2018  FOBT/FIT: Not on file  Cologuard: Not on file  Sigmoidoscopy: Not on file    Screening Current     Prostate Cancer Screening Individualized decision between patient and health care provider in men between ages of 53-78   Medicare will cover every 12 months beginning on the day after your 50th birthday PSA: 1 5 ng/mL     Screening Current     Hepatitis C Screening Once for adults born between [facility]  More frequently in patients at high risk for Hepatitis C Hep C Antibody: Not on file    Patient Declines   Diabetes Screening 1-2 times per year if you're at risk for diabetes or have pre-diabetes Fasting glucose: No results in last 5 years (No results in last 5 years)  A1C: 6 3 % (3/16/2023)  Screening Current   Cholesterol Screening Once every 5 years if you don't have a lipid disorder  May order more often based on risk factors  Lipid panel: 03/16/2023  Screening Not Indicated  History Lipid Disorder      Other Preventive Screenings Covered by Medicare:  1  Abdominal Aortic Aneurysm (AAA) Screening: covered once if your at risk  You're considered to be at risk if you have a family history of AAA or a male between the age of 73-68 who smoking at least 100 cigarettes in your lifetime  2  Lung Cancer Screening: covers low dose CT scan once per year if you meet all of the following conditions: (1) Age 50-69; (2) No signs or symptoms of lung cancer; (3) Current smoker or have quit smoking within the last 15 years; (4) You have a tobacco smoking history of at least 20 pack years (packs per day x number of years you smoked); (5) You get a written order from a healthcare provider  3  Glaucoma Screening: covered annually if you're considered high risk: (1) You have diabetes OR (2) Family history of glaucoma OR (3)  aged 48 and older OR (3)  American aged 72 and older  3  Osteoporosis Screening: covered every 2 years if you meet one of the following conditions: (1) Have a vertebral abnormality; (2) On glucocorticoid therapy for more than 3 months; (3) Have primary hyperparathyroidism; (4) On osteoporosis medications and need to assess response to drug therapy  5  HIV Screening: covered annually if you're between the age of 12-76  Also covered annually if you are younger than 13 and older than 72 with risk factors for HIV infection  For pregnant patients, it is covered up to 3 times per pregnancy      Immunizations:  Immunization Recommendations   Influenza Vaccine Annual influenza vaccination during flu season is recommended for all persons aged >= 6 months who do not have contraindications   Pneumococcal Vaccine   * Pneumococcal conjugate vaccine = PCV13 (Prevnar 13), PCV15 (Vaxneuvance), PCV20 (Prevnar 20)  * Pneumococcal polysaccharide vaccine = PPSV23 (Pneumovax) Adults 2364 years old: 1-3 doses may be recommended based on certain risk factors  Adults 72 years old: 1-2 doses may be recommended based off what pneumonia vaccine you previously received   Hepatitis B Vaccine 3 dose series if at intermediate or high risk (ex: diabetes, end stage renal disease, liver disease)   Tetanus (Td) Vaccine - COST NOT COVERED BY MEDICARE PART B Following completion of primary series, a booster dose should be given every 10 years to maintain immunity against tetanus  Td may also be given as tetanus wound prophylaxis  Tdap Vaccine - COST NOT COVERED BY MEDICARE PART B Recommended at least once for all adults  For pregnant patients, recommended with each pregnancy  Shingles Vaccine (Shingrix) - COST NOT COVERED BY MEDICARE PART B  2 shot series recommended in those aged 48 and above     Health Maintenance Due:      Topic Date Due   • Hepatitis C Screening  Never done   • Colorectal Cancer Screening  09/10/2028     Immunizations Due:      Topic Date Due   • Pneumococcal Vaccine: 65+ Years (1 - PCV) Never done   • COVID-19 Vaccine (3 - Booster for Chanel Peter series) 07/01/2021     Advance Directives   What are advance directives? Advance directives are legal documents that state your wishes and plans for medical care  These plans are made ahead of time in case you lose your ability to make decisions for yourself  Advance directives can apply to any medical decision, such as the treatments you want, and if you want to donate organs  What are the types of advance directives? There are many types of advance directives, and each state has rules about how to use them  You may choose a combination of any of the following:  · Living will:   This is a written record of the treatment you want  You can also choose which treatments you do not want, which to limit, and which to stop at a certain time  This includes surgery, medicine, IV fluid, and tube feedings  · Durable power of  for healthcare [facility]): This is a written record that states who you want to make healthcare choices for you when you are unable to make them for yourself  This person, called a proxy, is usually a family member or a friend  You may choose more than 1 proxy  · Do not resuscitate (DNR) order:  A DNR order is used in case your heart stops beating or you stop breathing  It is a request not to have certain forms of treatment, such as CPR  A DNR order may be included in other types of advance directives  · Medical directive: This covers the care that you want if you are in a coma, near death, or unable to make decisions for yourself  You can list the treatments you want for each condition  Treatment may include pain medicine, surgery, blood transfusions, dialysis, IV or tube feedings, and a ventilator (breathing machine)  · Values history: This document has questions about your views, beliefs, and how you feel and think about life  This information can help others choose the care that you would choose  Why are advance directives important? An advance directive helps you control your care  Although spoken wishes may be used, it is better to have your wishes written down  Spoken wishes can be misunderstood, or not followed  Treatments may be given even if you do not want them  An advance directive may make it easier for your family to make difficult choices about your care  Weight Management   Why it is important to manage your weight:  Being overweight increases your risk of health conditions such as heart disease, high blood pressure, type 2 diabetes, and certain types of cancer  It can also increase your risk for osteoarthritis, sleep apnea, and other respiratory problems   Aim for a slow, steady weight loss  Even a small amount of weight loss can lower your risk of health problems  How to lose weight safely:  A safe and healthy way to lose weight is to eat fewer calories and get regular exercise  You can lose up about 1 pound a week by decreasing the number of calories you eat by 500 calories each day  Healthy meal plan for weight management:  A healthy meal plan includes a variety of foods, contains fewer calories, and helps you stay healthy  A healthy meal plan includes the following:  · Eat whole-grain foods more often  A healthy meal plan should contain fiber  Fiber is the part of grains, fruits, and vegetables that is not broken down by your body  Whole-grain foods are healthy and provide extra fiber in your diet  Some examples of whole-grain foods are whole-wheat breads and pastas, oatmeal, brown rice, and bulgur  · Eat a variety of vegetables every day  Include dark, leafy greens such as spinach, kale, della greens, and mustard greens  Eat yellow and orange vegetables such as carrots, sweet potatoes, and winter squash  · Eat a variety of fruits every day  Choose fresh or canned fruit (canned in its own juice or light syrup) instead of juice  Fruit juice has very little or no fiber  · Eat low-fat dairy foods  Drink fat-free (skim) milk or 1% milk  Eat fat-free yogurt and low-fat cottage cheese  Try low-fat cheeses such as mozzarella and other reduced-fat cheeses  · Choose meat and other protein foods that are low in fat  Choose beans or other legumes such as split peas or lentils  Choose fish, skinless poultry (chicken or turkey), or lean cuts of red meat (beef or pork)  Before you cook meat or poultry, cut off any visible fat  · Use less fat and oil  Try baking foods instead of frying them  Add less fat, such as margarine, sour cream, regular salad dressing and mayonnaise to foods  Eat fewer high-fat foods   Some examples of high-fat foods include french fries, doughnuts, ice cream, and cakes  · Eat fewer sweets  Limit foods and drinks that are high in sugar  This includes candy, cookies, regular soda, and sweetened drinks  Exercise:  Exercise at least 30 minutes per day on most days of the week  Some examples of exercise include walking, biking, dancing, and swimming  You can also fit in more physical activity by taking the stairs instead of the elevator or parking farther away from stores  Ask your healthcare provider about the best exercise plan for you  © Copyright [employer] 2018 Information is for End User's use only and may not be sold, redistributed or otherwise used for commercial purposes   All illustrations and images included in CareNotes® are the copyrighted property of [employer]  or [address] [place], [place] [address]pe

## 2023-05-16 NOTE — PROGRESS NOTES
Assessment/Plan:    No problem-specific Assessment & Plan notes found for this encounter     htn not controlled  Suggest increase dose of amlodipine 5mg/d to 10mg/d and keep cardiology f/u appt or seen here in 2-4w    Since your blood pressure is high, please increase the amlodipine from 5mg/d to 10mg/d and see how your blood pressure is at your upcoming visit with your Cardiologist     ED  cialis use discussed  Risks/benefits aware  Refilled    Eczema stable  Use triamcinolone prn    Prediabetes  Diet/exercise discussed     Diagnoses and all orders for this visit:    Medicare annual wellness visit, subsequent    Organic impotence  -     tadalafil (Cialis) 20 MG tablet; Take 1 tablet (20 mg total) by mouth daily as needed for erectile dysfunction    Intrinsic eczema    Benign essential hypertension  -     amLODIPine (NORVASC) 10 mg tablet; Take 1 tablet (10 mg total) by mouth daily at bedtime        Return in about 6 months (around 11/16/2023) for Recheck  Subjective:      Patient ID: Kareen Hernandez is a 68 y o  male  Chief Complaint   Patient presents with   • Follow-up   • Hypertension     Sas/cma       HPI  Low fat/salt/chol/carb diet  Walks dog 1mi/d  fbs 105    The following portions of the patient's history were reviewed and updated as appropriate: allergies, current medications, past family history, past medical history, past social history, past surgical history and problem list     Review of Systems   Constitutional: Negative for fever  Respiratory: Negative for shortness of breath  Cardiovascular: Negative for chest pain           Current Outpatient Medications   Medication Sig Dispense Refill   • amLODIPine (NORVASC) 10 mg tablet Take 1 tablet (10 mg total) by mouth daily at bedtime 90 tablet 0   • clopidogrel (PLAVIX) 75 mg tablet TAKE 1 TABLET BY MOUTH DAILY AT  BEDTIME 30 tablet 11   • metoprolol tartrate (LOPRESSOR) 50 mg tablet Take 1 tablet (50 mg total) by mouth 2 (two) times a day 180 "tablet 3   • nitroglycerin (Nitrostat) 0 4 mg SL tablet Place 1 tablet (0 4 mg total) under the tongue every 5 (five) minutes as needed for chest pain 25 tablet 0   • rosuvastatin (CRESTOR) 20 MG tablet Take 1 tablet (20 mg total) by mouth daily at bedtime 90 tablet 3   • tadalafil (Cialis) 20 MG tablet Take 1 tablet (20 mg total) by mouth daily as needed for erectile dysfunction 30 tablet 5   • triamcinolone (KENALOG) 0 1 % ointment Apply topically 2 (two) times a day , short term, hands 80 g 5     No current facility-administered medications for this visit  Objective:    /80   Pulse 70   Temp 98 1 °F (36 7 °C)   Resp 18   Ht 5' 8 5\" (1 74 m)   Wt 81 2 kg (179 lb)   BMI 26 82 kg/m²        Physical Exam  Vitals and nursing note reviewed  Constitutional:       General: He is not in acute distress  Appearance: He is well-developed  He is not ill-appearing  HENT:      Head: Normocephalic  Right Ear: Tympanic membrane normal       Left Ear: Tympanic membrane normal    Eyes:      General: No scleral icterus  Conjunctiva/sclera: Conjunctivae normal    Neck:      Vascular: No carotid bruit  Cardiovascular:      Rate and Rhythm: Normal rate and regular rhythm  Heart sounds: No murmur heard  Pulmonary:      Effort: Pulmonary effort is normal  No respiratory distress  Breath sounds: No wheezing  Abdominal:      General: There is no distension  Palpations: Abdomen is soft  Tenderness: There is no abdominal tenderness  Musculoskeletal:         General: No deformity  Cervical back: Neck supple  Skin:     General: Skin is warm and dry  Coloration: Skin is not pale  Neurological:      Mental Status: He is alert  Motor: No weakness  Psychiatric:         Mood and Affect: Mood normal          Behavior: Behavior normal          Thought Content: Thought content normal          BMI Counseling: Body mass index is 26 82 kg/m²   The BMI is above normal  " Nutrition recommendations include decreasing portion sizes and moderation in carbohydrate intake  Exercise recommendations include exercising 3-5 times per week  No pharmacotherapy was ordered  Rationale for BMI follow-up plan is due to patient being overweight or obese              Erika Brower DO

## 2023-05-17 NOTE — PROGRESS NOTES
Assessment and Plan:     Problem List Items Addressed This Visit        Active Problems    Benign essential hypertension    Relevant Medications    amLODIPine (NORVASC) 10 mg tablet    Organic impotence    Relevant Medications    tadalafil (Cialis) 20 MG tablet    Medicare annual wellness visit, subsequent - Primary    Intrinsic eczema        Preventive health issues were discussed with patient, and age appropriate screening tests were ordered as noted in patient's After Visit Summary  Personalized health advice and appropriate referrals for health education or preventive services given if needed, as noted in patient's After Visit Summary       History of Present Illness:     Patient presents for a Medicare Wellness Visit    HPI   Patient Care Team:  Gerardo Marie DO as PCP - General  MD Gerardo Galo DO Lowery Fern, MD as Endoscopist     Review of Systems:     Review of Systems     Problem List:     Patient Active Problem List   Diagnosis   • Benign essential hypertension   • Chronic ischemic heart disease, unspecified   • Coronary artery disease involving native coronary artery of native heart without angina pectoris   • Hyperlipidemia LDL goal <100   • Organic impotence   • Antiplatelet or antithrombotic long-term use   • Prediabetes   • Medicare annual wellness visit, subsequent   • Benign colon polyp   • Hemorrhoids   • Overweight   • Stented coronary artery   • History of tobacco use   • Right thyroid nodule   • Toe deformity, left   • Intrinsic eczema   • Plantar wart of right foot   • Plantar wart      Past Medical and Surgical History:     Past Medical History:   Diagnosis Date   • Chest pain     [id number]  last assessed   • Congenital anomaly of coronary artery     16Jan2009  last assessed   • 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 [facility])     16jan2009  last assessed   • Fracture     right lower leg   • Hypertension    • Skin neoplasm     [id number] resolved   • Wears glasses      Past Surgical History:   Procedure Laterality Date   • ANGIOPLASTY     • COLONOSCOPY     • COLONOSCOPY N/A 9/10/2018    Procedure: COLONOSCOPY;  Surgeon: Zoë Reyes MD;  Location: [facility] GI LAB; Service: Gastroenterology   • CORONARY ANGIOPLASTY WITH STENT PLACEMENT      rca stent   • FRACTURE SURGERY Right     tibia,fibula fx repair   • TONSILLECTOMY      age 10   • [address] GUIDED THYROID BIOPSY  2019      Family History:     Family History   Problem Relation Age of Onset   • Coronary artery disease Father    • Heart disease Father    • Hypertension Father    • Macular degeneration Father    • Diabetes Sister    • Heart disease Brother         CABG age 55,      Social History:     Social History     Socioeconomic History   • Marital status: /Civil Union     Spouse name: None   • Number of children: None   • Years of education: None   • Highest education level: None   Occupational History   • None   Tobacco Use   • Smoking status: Former     Packs/day: 1 00     Years: 20 00     Pack years: 20 00     Types: Cigarettes     Start date: 6/15/1966     Quit date: 6/15/1986     Years since quittin 9   • Smokeless tobacco: Never   Vaping Use   • Vaping Use: Never used   Substance and Sexual Activity   • Alcohol use: Yes     Comment: occ   • Drug use: No   • Sexual activity: None   Other Topics Concern   • None   Social History Narrative   • None     Social Determinants of Health     Financial Resource Strain: Low Risk    • Difficulty of Paying Living Expenses: Not hard at all   Food Insecurity: Not on file   Transportation Needs: No Transportation Needs   • Lack of Transportation (Medical): No   • Lack of Transportation (Non-Medical):  No   Physical Activity: Not on file   Stress: Not on file   Social Connections: Not on file   Intimate Partner Violence: Not on file   Housing Stability: Not on file      Medications and Allergies:     Current Outpatient Medications   Medication Sig Dispense Refill   • amLODIPine (NORVASC) 10 mg tablet Take 1 tablet (10 mg total) by mouth daily at bedtime 90 tablet 0   • clopidogrel (PLAVIX) 75 mg tablet TAKE 1 TABLET BY MOUTH DAILY AT  BEDTIME 30 tablet 11   • metoprolol tartrate (LOPRESSOR) 50 mg tablet Take 1 tablet (50 mg total) by mouth 2 (two) times a day 180 tablet 3   • nitroglycerin (Nitrostat) 0 4 mg SL tablet Place 1 tablet (0 4 mg total) under the tongue every 5 (five) minutes as needed for chest pain 25 tablet 0   • rosuvastatin (CRESTOR) 20 MG tablet Take 1 tablet (20 mg total) by mouth daily at bedtime 90 tablet 3   • tadalafil (Cialis) 20 MG tablet Take 1 tablet (20 mg total) by mouth daily as needed for erectile dysfunction 30 tablet 5   • triamcinolone (KENALOG) 0 1 % ointment Apply topically 2 (two) times a day , short term, hands 80 g 5     No current facility-administered medications for this visit  Allergies   Allergen Reactions   • Lisinopril Cough      Immunizations:     Immunization History   Administered Date(s) Administered   • COVID-19 PFIZER VACCINE 0 3 ML IM 04/14/2021, 05/06/2021      Health Maintenance:         Topic Date Due   • Hepatitis C Screening  Never done   • Colorectal Cancer Screening  09/10/2028         Topic Date Due   • Pneumococcal Vaccine: 65+ Years (1 - PCV) Never done   • COVID-19 Vaccine (3 - Booster for Chanel Peter series) 07/01/2021      Medicare Screening Tests and Risk Assessments:     Win Orantes is here for his Subsequent Wellness visit  Last Medicare Wellness visit information reviewed, patient interviewed and updates made to the record today  Health Risk Assessment:   Patient rates overall health as very good  Patient feels that their physical health rating is same  Patient is very satisfied with their life  Eyesight was rated as same  Hearing was rated as same  Patient feels that their emotional and mental health rating is same  Patients states they are never, rarely angry   Patient states they are never, rarely unusually tired/fatigued  Pain experienced in the last 7 days has been none  Patient states that he has experienced no weight loss or gain in last 6 months  Fall Risk Screening: In the past year, patient has experienced: no history of falling in past year      Home Safety:  Patient does not have trouble with stairs inside or outside of their home  Patient has working smoke alarms and has no working carbon monoxide detector  Home safety hazards include: none  Nutrition:   Current diet is Low Cholesterol, Low Saturated Fat, Low Carb, No Added Salt and Limited junk food  Medications:   Patient is not currently taking any over-the-counter supplements  Patient is able to manage medications  Activities of Daily Living (ADLs)/Instrumental Activities of Daily Living (IADLs):   Walk and transfer into and out of bed and chair?: Yes  Dress and groom yourself?: Yes    Bathe or shower yourself?: Yes    Feed yourself? Yes  Do your laundry/housekeeping?: Yes  Manage your money, pay your bills and track your expenses?: Yes  Make your own meals?: Yes    Do your own shopping?: Yes    Previous Hospitalizations:   Any hospitalizations or ED visits within the last 12 months?: No      Advance Care Planning:   Living will: No    Durable POA for healthcare:  Yes    Advanced directive counseling given: No    End of Life Decisions reviewed with patient: No      Cognitive Screening:   Provider or family/friend/caregiver concerned regarding cognition?: No    PREVENTIVE SCREENINGS      Cardiovascular Screening:    General: Screening Not Indicated and History Lipid Disorder      Diabetes Screening:     General: Screening Current      Colorectal Cancer Screening:     General: Screening Current      Prostate Cancer Screening:    General: Screening Current      Osteoporosis Screening:    General: Screening Not Indicated      Abdominal Aortic Aneurysm (AAA) Screening:    Risk factors include: age between 73-69 yo and tobacco use "    General: Screening Not Indicated      Lung Cancer Screening:     General: Screening Not Indicated      Hepatitis C Screening:    General: Patient Declines    Hep C Screening Accepted: No     Screening, Brief Intervention, and Referral to Treatment (SBIRT)    Screening  Typical number of drinks in a day: 1  Typical number of drinks in a week: 1  Interpretation: Low risk drinking behavior  Single Item Drug Screening:  How often have you used an illegal drug (including marijuana) or a prescription medication for non-medical reasons in the past year? never    Single Item Drug Screen Score: 0  Interpretation: Negative screen for possible drug use disorder    Other Counseling Topics:   Car/seat belt/driving safety and regular weightbearing exercise  No results found       Physical Exam:     /80   Pulse 70   Temp 98 1 °F (36 7 °C)   Resp 18   Ht 5' 8 5\" (1 74 m)   Wt 81 2 kg (179 lb)   BMI 26 82 kg/m²     Physical Exam     Delonte Puckett DO  "

## 2023-05-31 ENCOUNTER — OFFICE VISIT (OUTPATIENT)
Dept: CARDIOLOGY CLINIC | Facility: CLINIC | Age: 73
End: 2023-05-31

## 2023-05-31 VITALS
HEIGHT: 69 IN | OXYGEN SATURATION: 98 % | SYSTOLIC BLOOD PRESSURE: 136 MMHG | WEIGHT: 179 LBS | DIASTOLIC BLOOD PRESSURE: 72 MMHG | BODY MASS INDEX: 26.51 KG/M2 | HEART RATE: 63 BPM

## 2023-05-31 DIAGNOSIS — I10 BENIGN ESSENTIAL HYPERTENSION: ICD-10-CM

## 2023-05-31 DIAGNOSIS — Z98.61 HISTORY OF PERCUTANEOUS CORONARY INTERVENTION: ICD-10-CM

## 2023-05-31 DIAGNOSIS — I25.10 CORONARY ARTERY DISEASE INVOLVING NATIVE CORONARY ARTERY OF NATIVE HEART WITHOUT ANGINA PECTORIS: ICD-10-CM

## 2023-05-31 DIAGNOSIS — R01.1 CARDIAC MURMUR: ICD-10-CM

## 2023-05-31 DIAGNOSIS — E78.5 DYSLIPIDEMIA: ICD-10-CM

## 2023-05-31 RX ORDER — NITROGLYCERIN 0.4 MG/1
0.4 TABLET SUBLINGUAL
Qty: 25 TABLET | Refills: 0 | Status: SHIPPED | OUTPATIENT
Start: 2023-05-31

## 2023-05-31 RX ORDER — AMLODIPINE BESYLATE 10 MG/1
10 TABLET ORAL
Qty: 90 TABLET | Refills: 2 | Status: SHIPPED | OUTPATIENT
Start: 2023-05-31

## 2023-05-31 NOTE — PROGRESS NOTES
Progress note - Cardiology Office   [facility]  Bonnie Doctor 68 y o  male MRN: [id number]  : 1950  Unit/Bed#:  Encounter: [id number]      Assessment:     1  Coronary artery disease involving native coronary artery of native heart without angina pectoris    2  Dyslipidemia    3  Benign essential hypertension    4  Cardiac murmur    5  History of percutaneous coronary intervention        Discussion summary and Plan:     1  Coronary artery disease  Patient had history of coronary artery disease with stenting done of right coronary artery in   Does not remember about other arteries  His exercise stress test he could not get the target heart rate he did a nuclear stress test 2021 which was nonischemic with normal LV systolic function  He has no change in symptoms  Encouraged him to do at least 2 to 3 miles a day      2  Benign essential hypertension  Blood pressure has been better controlled since amlodipine was increased to 10 mg   Prescription renewed continue metoprolol      3  Dyslipidemia  On statins  Continue Crestor 20 mg LDL was 87  Renew statins LFTs and cholesterol profile acceptable in 2023     4  Cardiac murmur  Echo Doppler shows normal LV systolic function mild thickened aortic valve no significant other disease noted  No change in clinical exam     5  History of erectile dysfunction  On Cialis  Patient also with a prescription for nitro  Advised him not to take nitro if he has taken Cialis he understand it very well that it can lead to interaction and decreasing blood pressure  Plan  Same Rx prescriptions renewed         Patient  was advised and educated to call our office  immediately or go to the nearest hospital if  patient has any new symptoms of chest pain/shortness of breath, near-syncope, syncope, light headedness sustained palpitations  or any other cardiovascular symptoms before their scheduled follow-up appointment    Office phone number was provided #[phone]  Thank you for your consultation  If you have any question please call me at [phone]    Counseling :  A description of the counseling  Goals and Barriers  Patient's ability to self care: Yes  Medication side effect reviewed with patient in detail and all their questions answered to their satisfaction  Primary Care Physician : Braden Andrade DO      HPI :     Sudhakar Lizarraga is a 68y o  year old male who was referred by primary care doctor for coronary artery disease  Patient who has medical history significant for coronary artery disease status post angioplasty of  Right coronary artery, hypertension, dyslipidemia who used to follow at [facility] now came to follow follow-up with us for his cardiovascular issues  Patient has not seen a cardiologist since 2019  He used to follow up with [facility]  He had a stenting done of his RCA in 2009  It was done for the symptoms of exertional shortness of breath and he underwent cardiac catheterization found to have RCA stenosis  He had a strong family history of heart problem multiple family members have history of coronary artery disease and has history of bypass surgery  He also himself has impaired glucose metabolism  He has no recent surgical history recently he was found to have thyroid nodule which is followed by the medical MD      He is  he lives with his wife  He does not smoke  Used to smoke quit 20 years ago    08/27/2021  Above reviewed  Patient came for follow-up he is doing well he has no new complaints  Denies any chest pain any shortness of breath  His cardiac workup reviewed  His stress test was equivocal because he could not get the target heart rate he only got 73% maximum predicted heart rate  Echo shows normal LV systolic function  He has no symptoms we have order nuclear stress test which he has not done yet    His cholesterol do acceptable but LDL is still not at goal is around 97  He wants to try diet for some months and if it does not help he is willing to increase his statin therapy  He had history of coronary artery disease with angioplasty of LAD and unknown vessel otherwise he is doing well  No nausea no vomiting no fever no chills blood pressure is acceptable though he had mild hypertensive response to exercise  His medications reviewed no other cardiovascular complaint at this time  11/17/2022    Above reviewed  Patient came for follow-up  Patient has medical history significant for equivocal stress test as he could only get 73% maximum predicted heart rate, coronary artery disease status post angioplasty of his RCA in 2009, strong family history of heart problems with multiple family member having history of coronary artery disease, dyslipidemia  Patient was later on scheduled for nuclear stress test in September 2021 which was normal   He is compliant with his medication  Never needed to use nitro  His currently on amlodipine 5 mg daily along with metoprolol Crestor and clopidogrel  His blood test done in March 2022 has been acceptable except for mildly elevated triglyceride levels  5/31/2023  Above reviewed  Patient came for follow-up  He had history of coronary artery disease s/p angioplasty of RCA in 2009, strong family Siv heart disease with multiple family member having coronary artery disease, dyslipidemia, erectile dysfunction on Cialis who came for follow-up  He had a nuclear stress test done in 2021 which was nonischemic  EKG today shows sinus them heart rate 63 bpm which is not changed  He simply had blood pressure was found to be elevated and primary care team increase his amlodipine to 10 mg which seem to be working better  His blood pressure has improved  His blood test done in March 2023 which shows his electrolyte and Cresta profiles are significantly improved and better    Slightly elevated triglyceride levels noted  No other issues at this time  Review of Systems   Constitutional: Negative for activity change, chills, diaphoresis, fever and unexpected weight change  HENT: Negative for congestion  Eyes: Negative for discharge and redness  Respiratory: Negative for cough, chest tightness, shortness of breath and wheezing  Cardiovascular: Negative  Negative for chest pain, palpitations and leg swelling  Gastrointestinal: Negative for abdominal pain, diarrhea and nausea  Endocrine: Negative  Genitourinary: Negative for decreased urine volume and urgency  Musculoskeletal: Negative  Negative for arthralgias, back pain and gait problem  Skin: Negative for rash and wound  Allergic/Immunologic: Negative  Neurological: Negative for dizziness, seizures, syncope, weakness, light-headedness and headaches  Hematological: Negative  Psychiatric/Behavioral: Negative for agitation and confusion  The patient is not nervous/anxious  Historical Information   Past Medical History:   Diagnosis Date   • Chest pain     [id number]  last assessed   • Congenital anomaly of coronary artery     16Jan2009  last assessed   • 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 [facility])     16jan2009  last assessed   • Fracture     right lower leg   • Hypertension    • Skin neoplasm     [id number] resolved   • Wears glasses      Past Surgical History:   Procedure Laterality Date   • ANGIOPLASTY     • COLONOSCOPY     • COLONOSCOPY N/A 9/10/2018    Procedure: COLONOSCOPY;  Surgeon: Conchita Saeed MD;  Location: [facility] GI LAB;   Service: Gastroenterology   • CORONARY ANGIOPLASTY WITH STENT PLACEMENT      rca stent   • FRACTURE SURGERY Right     tibia,fibula fx repair   • TONSILLECTOMY      age 10   • Jessi 634 THYROID BIOPSY  12/9/2019     Social History     Substance and Sexual Activity   Alcohol Use Yes    Comment: occ     Social History     Substance and Sexual Activity   Drug Use No     Social History "    Tobacco Use   Smoking Status Former   • Packs/day: 1 00   • Years: 20 00   • Total pack years: 20 00   • Types: Cigarettes   • Start date: 6/15/1966   • Quit date: 6/15/1986   • Years since quittin 9   Smokeless Tobacco Never     Family History:   Family History   Problem Relation Age of Onset   • Coronary artery disease Father    • Heart disease Father    • Hypertension Father    • Macular degeneration Father    • Diabetes Sister    • Heart disease Brother         CABG age 55,       Meds/Allergies     Allergies   Allergen Reactions   • Lisinopril Cough       Current Outpatient Medications:   •  amLODIPine (NORVASC) 10 mg tablet, Take 1 tablet (10 mg total) by mouth daily at bedtime, Disp: 90 tablet, Rfl: 2  •  clopidogrel (PLAVIX) 75 mg tablet, TAKE 1 TABLET BY MOUTH DAILY AT  BEDTIME, Disp: 30 tablet, Rfl: 11  •  metoprolol tartrate (LOPRESSOR) 50 mg tablet, Take 1 tablet (50 mg total) by mouth 2 (two) times a day, Disp: 180 tablet, Rfl: 3  •  nitroglycerin (Nitrostat) 0 4 mg SL tablet, Place 1 tablet (0 4 mg total) under the tongue every 5 (five) minutes as needed for chest pain, Disp: 25 tablet, Rfl: 0  •  rosuvastatin (CRESTOR) 20 MG tablet, Take 1 tablet (20 mg total) by mouth daily at bedtime, Disp: 90 tablet, Rfl: 3  •  tadalafil (Cialis) 20 MG tablet, Take 1 tablet (20 mg total) by mouth daily as needed for erectile dysfunction, Disp: 30 tablet, Rfl: 5  •  triamcinolone (KENALOG) 0 1 % ointment, Apply topically 2 (two) times a day , short term, hands, Disp: 80 g, Rfl: 5    Vitals: Blood pressure 136/72, pulse 63, height 5' 8 5\" (1 74 m), weight 81 2 kg (179 lb), SpO2 98 %  ?  Body mass index is 26 82 kg/m²    Wt Readings from Last 3 Encounters:   23 81 2 kg (179 lb)   23 81 2 kg (179 lb)   23 81 2 kg (179 lb)     Vitals:    23 1414   Weight: 81 2 kg (179 lb)     BP Readings from Last 3 Encounters:   23 136/72   23 148/80   23 150/84         Physical " Exam  Constitutional:       General: He is not in acute distress  Appearance: He is well-developed  He is not diaphoretic  Neck:      Thyroid: No thyromegaly  Vascular: No JVD  Trachea: No tracheal deviation  Cardiovascular:      Rate and Rhythm: Normal rate and regular rhythm  Heart sounds: S1 normal and S2 normal  Heart sounds not distant  Murmur heard  Systolic (ejection) murmur is present with a grade of 2/6  No friction rub  No gallop  No S3 or S4 sounds  Pulmonary:      Effort: Pulmonary effort is normal  No respiratory distress  Breath sounds: Normal breath sounds  No wheezing or rales  Chest:      Chest wall: No tenderness  Abdominal:      General: Bowel sounds are normal  There is no distension  Palpations: Abdomen is soft  Tenderness: There is no abdominal tenderness  Musculoskeletal:         General: No deformity  Cervical back: Neck supple  Skin:     General: Skin is warm and dry  Coloration: Skin is not pale  Findings: No rash  Neurological:      Mental Status: He is alert and oriented to person, place, and time  Psychiatric:         Behavior: Behavior normal          Judgment: Judgment normal              Diagnostic Studies Review Cardio:     exercise stress test   Exercise stress test done 06/18/2021 equivocal   Patient exercised for 8 minutes achieved workload of 10 4 metabolic equivalents  It was only 73% maximum predicted heart rate  And he has mild hypertensive response to exercise  He was recommended nuclear stress test which he did not due yet  Nuclear stress test done in September 2021 shows patient's EF is 57%, patient id lower level exercise  No ischemia was noted      Echo Doppler  Echo Doppler done 06/18/2020 shows normal LV systolic function  EF 55 60%, no significant valvular disease        EKG:  Twelve lead EKG done 04/12/2021 shows sinus rhythm heart rate 55 beats per minute no significant "abnormality  Twelve lead EKG 11/17/2022 shows normal sinus rhythm heart rate 65 beats per minute no change from previous EKG    Twelve-lead EKG 5/31/2023 shows normal sinus some heart rate 63 bpm normal EKG no change from previous EKG  Lab Review   Lab Results   Component Value Date    HCT 44 8 03/11/2021    HGB 15 3 03/11/2021    MCV 86 03/11/2021     03/11/2021    RDW 13 4 03/11/2021    WBC 7 8 03/11/2021     BMP:  Lab Results   Component Value Date    BUN 13 03/16/2023    CALCIUM 8 9 11/13/2017     03/16/2023    CO2 27 03/16/2023    CREATININE 1 11 03/16/2023    EGFR 70 03/16/2023    GLUC 105 (H) 03/16/2023    K 4 3 03/16/2023    SODIUM 140 03/16/2023     LFT:  Lab Results   Component Value Date    ALB 4 4 03/16/2023    ALKPHOS 40 11/13/2017    ALT 21 03/16/2023    AST 21 03/16/2023    TP 6 5 03/16/2023        Lab Results   Component Value Date    HGBA1C 6 3 (H) 03/16/2023     Lipid Profile:   Lab Results   Component Value Date    CHOLESTEROL 166 03/16/2023    HDL 48 03/16/2023    LDLCALC 91 03/16/2023    TRIG 157 (H) 03/16/2023     Lab Results   Component Value Date    CHOLESTEROL 166 03/16/2023    CHOLESTEROL 159 03/11/2022         Dr Suresh Inman MD [facility]      \"This note has been constructed using a voice recognition system  Therefore there may be syntax, spelling, and/or grammatical errors  Please call if you have any questions   \"  " Debridement Text: The wound edges were debrided prior to proceeding with the closure to facilitate wound healing.

## 2023-11-05 DIAGNOSIS — I25.10 CORONARY ARTERY DISEASE INVOLVING NATIVE CORONARY ARTERY OF NATIVE HEART WITHOUT ANGINA PECTORIS: ICD-10-CM

## 2023-11-06 RX ORDER — CLOPIDOGREL BISULFATE 75 MG/1
TABLET ORAL
Qty: 90 TABLET | Refills: 3 | Status: SHIPPED | OUTPATIENT
Start: 2023-11-06

## 2023-11-17 ENCOUNTER — TELEPHONE (OUTPATIENT)
Age: 73
End: 2023-11-17

## 2023-11-17 DIAGNOSIS — R73.03 PREDIABETES: Primary | ICD-10-CM

## 2023-11-17 NOTE — TELEPHONE ENCOUNTER
Patient misplaced blood work scripts . Patient requested that script be mailed via postal mail to his home. If you have any questions please contact patient at [phone] . Thank you for your assistance.

## 2023-12-08 DIAGNOSIS — L20.84 INTRINSIC ECZEMA: ICD-10-CM

## 2023-12-11 RX ORDER — TRIAMCINOLONE ACETONIDE 1 MG/G
OINTMENT TOPICAL
Qty: 80 G | Refills: 5 | Status: SHIPPED | OUTPATIENT
Start: 2023-12-11

## 2023-12-15 DIAGNOSIS — E78.5 DYSLIPIDEMIA: ICD-10-CM

## 2023-12-15 DIAGNOSIS — I10 BENIGN ESSENTIAL HYPERTENSION: ICD-10-CM

## 2023-12-18 RX ORDER — METOPROLOL TARTRATE 50 MG/1
50 TABLET, FILM COATED ORAL 2 TIMES DAILY
Qty: 180 TABLET | Refills: 3 | Status: SHIPPED | OUTPATIENT
Start: 2023-12-18

## 2023-12-18 RX ORDER — ROSUVASTATIN CALCIUM 20 MG/1
20 TABLET, COATED ORAL
Qty: 90 TABLET | Refills: 3 | Status: SHIPPED | OUTPATIENT
Start: 2023-12-18

## 2023-12-29 ENCOUNTER — TELEPHONE (OUTPATIENT)
Age: 73
End: 2023-12-29

## 2023-12-30 LAB
ALBUMIN SERPL-MCNC: 4.5 G/DL (ref 3.8–4.8)
ALBUMIN/GLOB SERPL: 2.4 {RATIO} (ref 1.2–2.2)
ALP SERPL-CCNC: 54 IU/L (ref 44–121)
ALT SERPL-CCNC: 20 IU/L (ref 0–44)
AST SERPL-CCNC: 19 IU/L (ref 0–40)
BILIRUB SERPL-MCNC: 0.5 MG/DL (ref 0–1.2)
BUN SERPL-MCNC: 12 MG/DL (ref 8–27)
BUN/CREAT SERPL: 11 (ref 10–24)
CALCIUM SERPL-MCNC: 9.3 MG/DL (ref 8.6–10.2)
CHLORIDE SERPL-SCNC: 101 MMOL/L (ref 96–106)
CO2 SERPL-SCNC: 24 MMOL/L (ref 20–29)
CREAT SERPL-MCNC: 1.06 MG/DL (ref 0.76–1.27)
EGFR: 74 ML/MIN/1.73
GLOBULIN SER-MCNC: 1.9 G/DL (ref 1.5–4.5)
GLUCOSE SERPL-MCNC: 131 MG/DL (ref 70–99)
POTASSIUM SERPL-SCNC: 4 MMOL/L (ref 3.5–5.2)
PROT SERPL-MCNC: 6.4 G/DL (ref 6–8.5)
SODIUM SERPL-SCNC: 138 MMOL/L (ref 134–144)

## 2024-01-12 DIAGNOSIS — I10 BENIGN ESSENTIAL HYPERTENSION: ICD-10-CM

## 2024-01-15 RX ORDER — AMLODIPINE BESYLATE 10 MG/1
10 TABLET ORAL
Qty: 90 TABLET | Refills: 3 | Status: SHIPPED | OUTPATIENT
Start: 2024-01-15

## 2024-01-16 ENCOUNTER — OFFICE VISIT (OUTPATIENT)
Dept: CARDIOLOGY CLINIC | Facility: CLINIC | Age: 74
End: 2024-01-16
Payer: COMMERCIAL

## 2024-01-16 VITALS
OXYGEN SATURATION: 96 % | DIASTOLIC BLOOD PRESSURE: 80 MMHG | HEIGHT: 69 IN | BODY MASS INDEX: 27.4 KG/M2 | SYSTOLIC BLOOD PRESSURE: 130 MMHG | HEART RATE: 57 BPM | WEIGHT: 185 LBS

## 2024-01-16 DIAGNOSIS — I25.10 CORONARY ARTERY DISEASE INVOLVING NATIVE CORONARY ARTERY OF NATIVE HEART WITHOUT ANGINA PECTORIS: ICD-10-CM

## 2024-01-16 DIAGNOSIS — E78.5 DYSLIPIDEMIA: ICD-10-CM

## 2024-01-16 DIAGNOSIS — I10 BENIGN ESSENTIAL HYPERTENSION: ICD-10-CM

## 2024-01-16 DIAGNOSIS — Z98.61 HISTORY OF PERCUTANEOUS CORONARY INTERVENTION: ICD-10-CM

## 2024-01-16 DIAGNOSIS — R01.1 CARDIAC MURMUR: ICD-10-CM

## 2024-01-16 PROCEDURE — 93000 ELECTROCARDIOGRAM COMPLETE: CPT | Performed by: INTERNAL MEDICINE

## 2024-01-16 PROCEDURE — 99214 OFFICE O/P EST MOD 30 MIN: CPT | Performed by: INTERNAL MEDICINE

## 2024-01-16 NOTE — PROGRESS NOTES
Progress note - Cardiology Office  [facility].    Santiago Rogers 73 y.o. male MRN: [id number]  : 1950  Unit/Bed#:  Encounter: [id number]      Assessment:     1. Coronary artery disease involving native coronary artery of native heart without angina pectoris    2. Benign essential hypertension    3. Dyslipidemia    4. Cardiac murmur    5. History of percutaneous coronary intervention        Discussion summary and Plan:     1. Coronary artery disease.  Patient had history of coronary artery disease with stenting done of right coronary artery in .  Does not remember about other arteries.  His exercise stress test he could not get the target heart rate he did a nuclear stress test 2021 which was nonischemic with normal LV systolic function.  He has no change in symptoms.  Encouraged him to do at least 2 to 3 miles a day.  His labs shows triglycerides slightly elevated reviewed with him to cut back on his carbohydrate intake.      2. Benign essential hypertension.  Blood pressure has been better controlled since amlodipine was increased to 10 mg.  He is also on metoprolol blood pressure and heart rate acceptable.      3. Dyslipidemia.  On statins.  Continue Crestor 20 mg LDL was 87.  Renew statins LFTs and cholesterol profile acceptable in 2023.  Recently done electrolyte are stable.     4.  Cardiac murmur.  Echo Doppler shows normal LV systolic function mild thickened aortic valve no significant other disease noted.  No change in clinical exam.    5. History of erectile dysfunction.  On Cialis.  Patient also with a prescription for nitro.  Advised him not to take nitro if he has taken Cialis he understand it very well that it can lead to interaction and decreasing blood pressure.    Plan.     Rx follow-up 6 6 to 9 months.      Patient  was advised and educated to call our office  immediately or go to the nearest hospital if  patient has any new symptoms of chest  pain/shortness of breath, near-syncope, syncope, light headedness sustained palpitations  or any other cardiovascular symptoms before their scheduled follow-up appointment.  Office phone number was provided #[phone].    Thank you for your consultation.  If you have any question please call me at [phone]    Counseling :  A description of the counseling.  Goals and Barriers.  Patient's ability to self care: Yes  Medication side effect reviewed with patient in detail and all their questions answered to their satisfaction.      Primary Care Physician : Isaac Shannon DO      HPI :     Santiago Rogers is a 73 y.o. year old male who was referred by primary care doctor for coronary artery disease.  Patient who has medical history significant for coronary artery disease status post angioplasty of  Right coronary artery, hypertension, dyslipidemia who used to follow at [facility] now came to follow follow-up with us for his cardiovascular issues.  Patient has not seen a cardiologist since 2019. He used to follow up with [facility].  He had a stenting done of his RCA in 2009. It was done for the symptoms of exertional shortness of breath and he underwent cardiac catheterization found to have RCA stenosis.    He had a strong family history of heart problem multiple family members have history of coronary artery disease and has history of bypass surgery.  He also himself has impaired glucose metabolism.    He has no recent surgical history recently he was found to have thyroid nodule which is followed by the medical MD.     He is  he lives with his wife.  He does not smoke.  Used to smoke quit 20 years ago    08/27/2021.    Above reviewed.  Patient came for follow-up he is doing well he has no new complaints.  Denies any chest pain any shortness of breath.  His cardiac workup reviewed.  His stress test was equivocal because he could not get the target heart rate he only got 73%  maximum predicted heart rate.  Echo shows normal LV systolic function.  He has no symptoms we have order nuclear stress test which he has not done yet.  His cholesterol do acceptable but LDL is still not at goal is around 97. He wants to try diet for some months and if it does not help he is willing to increase his statin therapy.  He had history of coronary artery disease with angioplasty of LAD and unknown vessel otherwise he is doing well.  No nausea no vomiting no fever no chills blood pressure is acceptable though he had mild hypertensive response to exercise.  His medications reviewed no other cardiovascular complaint at this time.    11/17/2022    Above reviewed.  Patient came for follow-up.  Patient has medical history significant for equivocal stress test as he could only get 73% maximum predicted heart rate, coronary artery disease status post angioplasty of his RCA in 2009, strong family history of heart problems with multiple family member having history of coronary artery disease, dyslipidemia.  Patient was later on scheduled for nuclear stress test in September 2021 which was normal.  He is compliant with his medication.  Never needed to use nitro.  His currently on amlodipine 5 mg daily along with metoprolol Crestor and clopidogrel.  His blood test done in March 2022 has been acceptable except for mildly elevated triglyceride levels.    5/31/2023.    Above reviewed.  Patient came for follow-up.  He had history of coronary artery disease s/p angioplasty of RCA in 2009, strong family Siv heart disease with multiple family member having coronary artery disease, dyslipidemia, erectile dysfunction on Cialis who came for follow-up.  He had a nuclear stress test done in 2021 which was nonischemic.  EKG today shows sinus them heart rate 63 bpm which is not changed.  He simply had blood pressure was found to be elevated and primary care team increase his amlodipine to 10 mg which seem to be working better.   His blood pressure has improved.  His blood test done in March 2023 which shows his electrolyte and Cresta profiles are significantly improved and better.  Slightly elevated triglyceride levels noted.  No other issues at this time.    1/16/2024.    Above reviewed.  Patient came for follow-up.  Had history of coronary artery disease to respond history of artery in 2009, strong family heart disease, dyslipidemia, erectile dysfunction on Cialis who came for follow-up.  He has a nuclear stress was done in 2021 which shows no ischemia and he has normal LV systolic function at that time.  His blood test from March 2023 as well as December 2023 reviewed.  LFTs and cholesterol profile is acceptable vitals are stable.  He is currently taking Plavix, amlodipine 10 mg daily Crestor 20 and metoprolol 50 mg twice a day.  He has no new symptoms he feels well.  His EKG shows no change from previous EKG.    Review of Systems   Constitutional:  Negative for activity change, chills, diaphoresis, fever and unexpected weight change.   HENT:  Negative for congestion.    Eyes:  Negative for discharge and redness.   Respiratory:  Negative for cough, chest tightness, shortness of breath and wheezing.    Cardiovascular: Negative.  Negative for chest pain, palpitations and leg swelling.   Gastrointestinal:  Negative for abdominal pain, diarrhea and nausea.   Endocrine: Negative.    Genitourinary:  Negative for decreased urine volume and urgency.   Musculoskeletal: Negative.  Negative for arthralgias, back pain and gait problem.   Skin:  Negative for rash and wound.   Allergic/Immunologic: Negative.    Neurological:  Negative for dizziness, seizures, syncope, weakness, light-headedness and headaches.   Hematological: Negative.    Psychiatric/Behavioral:  Negative for agitation and confusion. The patient is not nervous/anxious.        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)     2009  last assessed   • Fracture     right lower leg   • Hypertension    • Skin neoplasm     [id number] resolved   • Wears glasses      Past Surgical History:   Procedure Laterality Date   • ANGIOPLASTY     • COLONOSCOPY     • COLONOSCOPY N/A 9/10/2018    Procedure: COLONOSCOPY;  Surgeon: Prem Friedman MD;  Location: [facility];  Service: Gastroenterology   • CORONARY ANGIOPLASTY WITH STENT PLACEMENT      rca stent   • FRACTURE SURGERY Right     tibia,fibula fx repair   • TONSILLECTOMY      age 6   • US GUIDED THYROID BIOPSY  2019     Social History     Substance and Sexual Activity   Alcohol Use Yes    Comment: occ     Social History     Substance and Sexual Activity   Drug Use No     Social History     Tobacco Use   Smoking Status Former   • Current packs/day: 0.00   • Average packs/day: 1 pack/day for 20.0 years (20.0 ttl pk-yrs)   • Types: Cigarettes   • Start date: 6/15/1966   • Quit date: 6/15/1986   • Years since quittin.6   Smokeless Tobacco Never     Family History:   Family History   Problem Relation Age of Onset   • Coronary artery disease Father    • Heart disease Father    • Hypertension Father    • Macular degeneration Father    • Diabetes Sister    • Heart disease Brother         CABG age 46,       Meds/Allergies     Allergies   Allergen Reactions   • Lisinopril Cough       Current Outpatient Medications:   •  amLODIPine (NORVASC) 10 mg tablet, TAKE 1 TABLET BY MOUTH DAILY AT  BEDTIME, Disp: 90 tablet, Rfl: 3  •  clopidogrel (PLAVIX) 75 mg tablet, TAKE 1 TABLET BY MOUTH DAILY AT  BEDTIME, Disp: 90 tablet, Rfl: 3  •  metoprolol tartrate (LOPRESSOR) 50 mg tablet, TAKE 1 TABLET BY MOUTH TWICE  DAILY, Disp: 180 tablet, Rfl: 3  •  nitroglycerin (Nitrostat) 0.4 mg SL tablet, Place 1 tablet (0.4 mg total) under the tongue every 5 (five) minutes as needed  "for chest pain, Disp: 25 tablet, Rfl: 0  •  rosuvastatin (CRESTOR) 20 MG tablet, TAKE 1 TABLET BY MOUTH DAILY AT  BEDTIME, Disp: 90 tablet, Rfl: 3  •  tadalafil (Cialis) 20 MG tablet, Take 1 tablet (20 mg total) by mouth daily as needed for erectile dysfunction, Disp: 30 tablet, Rfl: 5  •  triamcinolone (KENALOG) 0.1 % ointment, APPLY TOPICALLY TWO TIMES A DAY TO HANDS (SHORT TERM USE) (GENERIC FOR KENALOG), Disp: 80 g, Rfl: 5    Vitals: Blood pressure 130/80, pulse 57, height 5' 8.5\" (1.74 m), weight 83.9 kg (185 lb), SpO2 96%.  ?  Body mass index is 27.72 kg/m².  Wt Readings from Last 3 Encounters:   01/16/24 83.9 kg (185 lb)   05/31/23 81.2 kg (179 lb)   05/16/23 81.2 kg (179 lb)     Vitals:    01/16/24 1245   Weight: 83.9 kg (185 lb)     BP Readings from Last 3 Encounters:   01/16/24 130/80   05/31/23 136/72   05/16/23 148/80         Physical Exam  Constitutional:       General: He is not in acute distress.     Appearance: He is well-developed. He is not diaphoretic.   Neck:      Thyroid: No thyromegaly.      Vascular: No JVD.      Trachea: No tracheal deviation.   Cardiovascular:      Rate and Rhythm: Normal rate and regular rhythm.      Heart sounds: S1 normal and S2 normal. Heart sounds not distant. Murmur heard.      Systolic (ejection) murmur is present with a grade of 2/6.      No friction rub. No gallop. No S3 or S4 sounds.   Pulmonary:      Effort: Pulmonary effort is normal. No respiratory distress.      Breath sounds: Normal breath sounds. No wheezing or rales.   Chest:      Chest wall: No tenderness.   Abdominal:      General: Bowel sounds are normal. There is no distension.      Palpations: Abdomen is soft.      Tenderness: There is no abdominal tenderness.   Musculoskeletal:         General: No deformity.      Cervical back: Neck supple.   Skin:     General: Skin is warm and dry.      Coloration: Skin is not pale.      Findings: No rash.   Neurological:      Mental Status: He is alert and oriented " to person, place, and time.   Psychiatric:         Behavior: Behavior normal.         Judgment: Judgment normal.             Diagnostic Studies Review Cardio:     exercise stress test.  Exercise stress test done 06/18/2021 equivocal.  Patient exercised for 8 minutes achieved workload of 10.1 metabolic equivalents.  It was only 73% maximum predicted heart rate.  And he has mild hypertensive response to exercise.  He was recommended nuclear stress test which he did not due yet.    Nuclear stress test done in September 2021 shows patient's EF is 57%, patient id lower level exercise.  No ischemia was noted      Echo Doppler.  Echo Doppler done 06/18/2020 shows normal LV systolic function.  EF 55 60%, no significant valvular disease.      EKG:  Twelve lead EKG done 04/12/2021 shows sinus rhythm heart rate 55 beats per minute no significant abnormality.    Twelve lead EKG 11/17/2022 shows normal sinus rhythm heart rate 65 beats per minute no change from previous EKG    Twelve-lead EKG 5/31/2023 shows normal sinus some heart rate 63 bpm normal EKG no change from previous EKG.    Twelve-lead EKG done on 1/16/2024 normal sinus some heart rate 57 bpm.  No change from previous EKG and no other significant normality.    Lab Review   Lab Results   Component Value Date    WBC 7.8 03/11/2021    HGB 15.3 03/11/2021    HCT 44.8 03/11/2021    MCV 86 03/11/2021    RDW 13.4 03/11/2021     03/11/2021     BMP:  Lab Results   Component Value Date    SODIUM 138 12/29/2023    K 4.0 12/29/2023     12/29/2023    CO2 24 12/29/2023    BUN 12 12/29/2023    CREATININE 1.06 12/29/2023    GLUC 131 (H) 12/29/2023    CALCIUM 8.9 11/13/2017    EGFR 74 12/29/2023     LFT:  Lab Results   Component Value Date    AST 19 12/29/2023    ALT 20 12/29/2023    ALKPHOS 40 11/13/2017    TP 6.4 12/29/2023    ALB 4.5 12/29/2023        Lab Results   Component Value Date    HGBA1C 6.3 (H) 03/16/2023     Lipid Profile:   Lab Results   Component Value Date  "   CHOLESTEROL 166 03/16/2023    HDL 48 03/16/2023    LDLCALC 91 03/16/2023    TRIG 157 (H) 03/16/2023     Lab Results   Component Value Date    CHOLESTEROL 166 03/16/2023    CHOLESTEROL 159 03/11/2022         Dr. Dilma Horta MD [facility]      \"This note has been constructed using a voice recognition system.Therefore there may be syntax, spelling, and/or grammatical errors. Please call if you have any questions. \"  "

## 2024-01-21 ENCOUNTER — VBI (OUTPATIENT)
Dept: ADMINISTRATIVE | Facility: OTHER | Age: 74
End: 2024-01-21

## 2024-01-21 NOTE — TELEPHONE ENCOUNTER
Upon review of the In Basket request we were able to locate, review, and update the patient chart as requested for CRC: Colonoscopy.    Any additional questions or concerns should be emailed to the Practice Liaisons via the appropriate education email address, please do not reply via In Basket.    Thank you  DEISY JOE

## 2024-01-30 ENCOUNTER — TELEPHONE (OUTPATIENT)
Dept: GASTROENTEROLOGY | Facility: CLINIC | Age: 74
End: 2024-01-30

## 2024-01-30 NOTE — TELEPHONE ENCOUNTER
Pt is due for a colonoscopy - hx of multiple polyps with Dr Friedman, however, he has retired. I called and spoke to pt whom informed he does not wish to schedule at this time.  Recall letter mailed as a reminder to pt that he is due.

## 2024-02-21 PROBLEM — Z00.00 MEDICARE ANNUAL WELLNESS VISIT, SUBSEQUENT: Status: RESOLVED | Noted: 2018-06-14 | Resolved: 2024-02-21

## 2024-08-21 ENCOUNTER — HOSPITAL ENCOUNTER (EMERGENCY)
Facility: HOSPITAL | Age: 74
Discharge: HOME | End: 2024-08-21
Attending: EMERGENCY MEDICINE
Payer: COMMERCIAL

## 2024-08-21 ENCOUNTER — APPOINTMENT (EMERGENCY)
Dept: RADIOLOGY | Facility: HOSPITAL | Age: 74
End: 2024-08-21
Payer: COMMERCIAL

## 2024-08-21 VITALS
TEMPERATURE: 98.3 F | DIASTOLIC BLOOD PRESSURE: 80 MMHG | RESPIRATION RATE: 16 BRPM | OXYGEN SATURATION: 97 % | HEART RATE: 72 BPM | SYSTOLIC BLOOD PRESSURE: 171 MMHG
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
## 2024-08-21 DIAGNOSIS — S43.006A SHOULDER DISLOCATION: ICD-10-CM

## 2024-08-21 DIAGNOSIS — I77.1 ARTERIAL STENOSIS (HCC): ICD-10-CM

## 2024-08-21 DIAGNOSIS — G93.0 ARACHNOID CYST: ICD-10-CM

## 2024-08-21 LAB
ALBUMIN SERPL BCG-MCNC: 4.5 G/DL (ref 3.5–5)
ALP SERPL-CCNC: 47 U/L (ref 34–104)
ALT SERPL W P-5'-P-CCNC: 18 U/L (ref 7–52)
ANION GAP SERPL CALCULATED.3IONS-SCNC: 9 MMOL/L (ref 4–13)
AST SERPL W P-5'-P-CCNC: 18 U/L (ref 13–39)
BASOPHILS # BLD AUTO: 0.11 THOUSANDS/ÂΜL (ref 0–0.1)
BASOPHILS NFR BLD AUTO: 1 % (ref 0–1)
BILIRUB SERPL-MCNC: 0.62 MG/DL (ref 0.2–1)
BUN SERPL-MCNC: 14 MG/DL (ref 5–25)
CALCIUM SERPL-MCNC: 9.2 MG/DL (ref 8.4–10.2)
CHLORIDE SERPL-SCNC: 101 MMOL/L (ref 96–108)
CO2 SERPL-SCNC: 28 MMOL/L (ref 21–32)
CREAT SERPL-MCNC: 1.31 MG/DL (ref 0.6–1.3)
EOSINOPHIL # BLD AUTO: 0.47 THOUSAND/ÂΜL (ref 0–0.61)
EOSINOPHIL NFR BLD AUTO: 5 % (ref 0–6)
ERYTHROCYTE [DISTWIDTH] IN BLOOD BY AUTOMATED COUNT: 13.5 % (ref 11.6–15.1)
GFR SERPL CREATININE-BSD FRML MDRD: 53 ML/MIN/1.73SQ M
GLUCOSE SERPL-MCNC: 176 MG/DL (ref 65–140)
HCT VFR BLD AUTO: 44.3 % (ref 36.5–49.3)
HGB BLD-MCNC: 14.6 G/DL (ref 12–17)
IMM GRANULOCYTES # BLD AUTO: 0.04 THOUSAND/UL (ref 0–0.2)
IMM GRANULOCYTES NFR BLD AUTO: 0 % (ref 0–2)
LYMPHOCYTES # BLD AUTO: 3.82 THOUSANDS/ÂΜL (ref 0.6–4.47)
LYMPHOCYTES NFR BLD AUTO: 42 % (ref 14–44)
MCH RBC QN AUTO: 28.8 PG (ref 26.8–34.3)
MCHC RBC AUTO-ENTMCNC: 33 G/DL (ref 31.4–37.4)
MCV RBC AUTO: 87 FL (ref 82–98)
MONOCYTES # BLD AUTO: 0.74 THOUSAND/ÂΜL (ref 0.17–1.22)
MONOCYTES NFR BLD AUTO: 8 % (ref 4–12)
NEUTROPHILS # BLD AUTO: 3.85 THOUSANDS/ÂΜL (ref 1.85–7.62)
NEUTS SEG NFR BLD AUTO: 44 % (ref 43–75)
NRBC BLD AUTO-RTO: 0 /100 WBCS
PLATELET # BLD AUTO: 283 THOUSANDS/UL (ref 149–390)
PMV BLD AUTO: 10.1 FL (ref 8.9–12.7)
POTASSIUM SERPL-SCNC: 3.8 MMOL/L (ref 3.5–5.3)
PROT SERPL-MCNC: 7 G/DL (ref 6.4–8.4)
RBC # BLD AUTO: 5.07 MILLION/UL (ref 3.88–5.62)
SODIUM SERPL-SCNC: 138 MMOL/L (ref 135–147)
WBC # BLD AUTO: 9.03 THOUSAND/UL (ref 4.31–10.16)

## 2024-08-21 PROCEDURE — 36415 COLL VENOUS BLD VENIPUNCTURE: CPT | Performed by: EMERGENCY MEDICINE

## 2024-08-21 PROCEDURE — 70486 CT MAXILLOFACIAL W/O DYE: CPT

## 2024-08-21 PROCEDURE — 96361 HYDRATE IV INFUSION ADD-ON: CPT

## 2024-08-21 PROCEDURE — 74177 CT ABD & PELVIS W/CONTRAST: CPT

## 2024-08-21 PROCEDURE — 85025 COMPLETE CBC W/AUTO DIFF WBC: CPT | Performed by: EMERGENCY MEDICINE

## 2024-08-21 PROCEDURE — 23650 CLTX SHO DSLC W/MNPJ WO ANES: CPT | Performed by: EMERGENCY MEDICINE

## 2024-08-21 PROCEDURE — 96374 THER/PROPH/DIAG INJ IV PUSH: CPT

## 2024-08-21 PROCEDURE — 73030 X-RAY EXAM OF SHOULDER: CPT

## 2024-08-21 PROCEDURE — 70450 CT HEAD/BRAIN W/O DYE: CPT

## 2024-08-21 PROCEDURE — 71260 CT THORAX DX C+: CPT

## 2024-08-21 PROCEDURE — 96375 TX/PRO/DX INJ NEW DRUG ADDON: CPT

## 2024-08-21 PROCEDURE — 90715 TDAP VACCINE 7 YRS/> IM: CPT | Performed by: EMERGENCY MEDICINE

## 2024-08-21 PROCEDURE — 73020 X-RAY EXAM OF SHOULDER: CPT

## 2024-08-21 PROCEDURE — 99285 EMERGENCY DEPT VISIT HI MDM: CPT | Performed by: EMERGENCY MEDICINE

## 2024-08-21 PROCEDURE — 72125 CT NECK SPINE W/O DYE: CPT

## 2024-08-21 PROCEDURE — 80053 COMPREHEN METABOLIC PANEL: CPT | Performed by: EMERGENCY MEDICINE

## 2024-08-21 PROCEDURE — 99284 EMERGENCY DEPT VISIT MOD MDM: CPT

## 2024-08-21 PROCEDURE — 96376 TX/PRO/DX INJ SAME DRUG ADON: CPT

## 2024-08-21 RX ORDER — HYDROMORPHONE HCL/PF 1 MG/ML
1 SYRINGE (ML) INJECTION ONCE
Status: COMPLETED | OUTPATIENT
Start: 2024-08-21 | End: 2024-08-21

## 2024-08-21 RX ORDER — MIDAZOLAM HYDROCHLORIDE 2 MG/2ML
2 INJECTION, SOLUTION INTRAMUSCULAR; INTRAVENOUS ONCE
Status: DISCONTINUED | OUTPATIENT
Start: 2024-08-21 | End: 2024-08-21

## 2024-08-21 RX ORDER — ONDANSETRON 2 MG/ML
4 INJECTION INTRAMUSCULAR; INTRAVENOUS ONCE
Status: COMPLETED | OUTPATIENT
Start: 2024-08-21 | End: 2024-08-21

## 2024-08-21 RX ORDER — FENTANYL CITRATE 50 UG/ML
100 INJECTION, SOLUTION INTRAMUSCULAR; INTRAVENOUS ONCE
Status: COMPLETED | OUTPATIENT
Start: 2024-08-21 | End: 2024-08-21

## 2024-08-21 RX ORDER — MIDAZOLAM HYDROCHLORIDE 2 MG/2ML
5 INJECTION, SOLUTION INTRAMUSCULAR; INTRAVENOUS ONCE
Status: DISCONTINUED | OUTPATIENT
Start: 2024-08-21 | End: 2024-08-21 | Stop reason: HOSPADM

## 2024-08-21 RX ADMIN — IOHEXOL 100 ML: 350 INJECTION, SOLUTION INTRAVENOUS at 17:12

## 2024-08-21 RX ADMIN — TETANUS TOXOID, REDUCED DIPHTHERIA TOXOID AND ACELLULAR PERTUSSIS VACCINE, ADSORBED 0.5 ML: 5; 2.5; 8; 8; 2.5 SUSPENSION INTRAMUSCULAR at 16:08

## 2024-08-21 RX ADMIN — FENTANYL CITRATE 100 MCG: 50 INJECTION INTRAMUSCULAR; INTRAVENOUS at 17:43

## 2024-08-21 RX ADMIN — HYDROMORPHONE HYDROCHLORIDE 1 MG: 1 INJECTION, SOLUTION INTRAMUSCULAR; INTRAVENOUS; SUBCUTANEOUS at 16:05

## 2024-08-21 RX ADMIN — SODIUM CHLORIDE 1000 ML: 0.9 INJECTION, SOLUTION INTRAVENOUS at 16:24

## 2024-08-21 RX ADMIN — HYDROMORPHONE HYDROCHLORIDE 1 MG: 1 INJECTION, SOLUTION INTRAMUSCULAR; INTRAVENOUS; SUBCUTANEOUS at 16:56

## 2024-08-21 RX ADMIN — ONDANSETRON 4 MG: 2 INJECTION INTRAMUSCULAR; INTRAVENOUS at 17:49

## 2024-08-21 NOTE — DISCHARGE INSTRUCTIONS
Probable small simple to complex left renal cyst. Follow-up CT scan recommended in 6 months.   Probable aorto biiliac insufficiency with small caliber of the distal abdominal aorta at the aortic bifurcation and probable greater than 70% stenosis of the proximal common iliac arteries at their origins. Further evaluation with dedicated CT   angiography or conventional angiography would be useful.   Incidentally identified moderate-large left posterior fossa arachnoid cyst. Neurosurgery as outpatient    Please follow up with orthopaedics as listed  Please follow up with your family doctor as listed

## 2024-08-21 NOTE — ED PROVIDER NOTES
History  Chief Complaint   Patient presents with    Fall     Pt with fall today about 3-4 from loft through floorboard, hit head and left side, shoulder pain, left leg pain. Pt on plavix.     74-year-old male fell from a 3 foot fall today about from loft through floorboard, hit head and left side, shoulder pain, left leg pain.  Appears his left shoulder is dislocated there is blood coming out of his nose he probably fell face down as well denies any loss of consciousness neck pain back pain no other injuries or complaints he is on Plavix.      History provided by:  Patient   used: No        Prior to Admission Medications   Prescriptions Last Dose Informant Patient Reported? Taking?   amLODIPine (NORVASC) 10 mg tablet  Self No No   Sig: TAKE 1 TABLET BY MOUTH DAILY AT  BEDTIME   clopidogrel (PLAVIX) 75 mg tablet  Self No No   Sig: TAKE 1 TABLET BY MOUTH DAILY AT  BEDTIME   metoprolol tartrate (LOPRESSOR) 50 mg tablet  Self No No   Sig: TAKE 1 TABLET BY MOUTH TWICE  DAILY   nitroglycerin (Nitrostat) 0.4 mg SL tablet  Self No No   Sig: Place 1 tablet (0.4 mg total) under the tongue every 5 (five) minutes as needed for chest pain   rosuvastatin (CRESTOR) 20 MG tablet  Self No No   Sig: TAKE 1 TABLET BY MOUTH DAILY AT  BEDTIME   tadalafil (Cialis) 20 MG tablet  Self No No   Sig: Take 1 tablet (20 mg total) by mouth daily as needed for erectile dysfunction   triamcinolone (KENALOG) 0.1 % ointment  Self No No   Sig: APPLY TOPICALLY TWO TIMES A DAY TO HANDS (SHORT TERM USE) (GENERIC FOR KENALOG)      Facility-Administered Medications: None       Past Medical History:   Diagnosis Date    Chest pain     25nov2008  last assessed    Congenital anomaly of coronary artery     16Jan2009  last assessed    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 (HCC)     16jan2009  last assessed    Fracture     right lower leg    Hypertension     Skin neoplasm     [id number] resolved    Wears glasses        Past  Surgical History:   Procedure Laterality Date    ANGIOPLASTY      COLONOSCOPY      COLONOSCOPY N/A 9/10/2018    Procedure: COLONOSCOPY;  Surgeon: Prem Friedman MD;  Location: [facility] GI LAB;  Service: Gastroenterology    CORONARY ANGIOPLASTY WITH STENT PLACEMENT      rca stent    FRACTURE SURGERY Right     tibia,fibula fx repair    TONSILLECTOMY      age 6    US GUIDED THYROID BIOPSY  2019       Family History   Problem Relation Age of Onset    Coronary artery disease Father     Heart disease Father     Hypertension Father     Macular degeneration Father     Diabetes Sister     Heart disease Brother         CABG age 46,     I have reviewed and agree with the history as documented.    E-Cigarette/Vaping    E-Cigarette Use Never User      E-Cigarette/Vaping Substances    Nicotine No     THC No     CBD No     Flavoring No     Other No     Unknown No      Social History     Tobacco Use    Smoking status: Former     Current packs/day: 0.00     Average packs/day: 1 pack/day for 20.0 years (20.0 ttl pk-yrs)     Types: Cigarettes     Start date: 6/15/1966     Quit date: 6/15/1986     Years since quittin.2    Smokeless tobacco: Never   Vaping Use    Vaping status: Never Used   Substance Use Topics    Alcohol use: Yes     Comment: occ    Drug use: No       Review of Systems   Constitutional: Negative.    HENT:  Positive for nosebleeds. Negative for facial swelling.    Eyes: Negative.    Respiratory: Negative.     Cardiovascular: Negative.    Gastrointestinal: Negative.    Endocrine: Negative.    Genitourinary: Negative.    Musculoskeletal:  Positive for arthralgias and myalgias.   Skin: Negative.    Allergic/Immunologic: Negative.    Neurological: Negative.    Hematological: Negative.    Psychiatric/Behavioral: Negative.     All other systems reviewed and are negative.      Physical Exam  Physical Exam  Vitals and nursing note reviewed.   Constitutional:       Appearance: Normal appearance.   HENT:      Head:  Normocephalic and atraumatic.      Comments: Bleeding noted from the nares.  No septal hematoma     Nose: Nose normal.      Mouth/Throat:      Mouth: Mucous membranes are moist.   Eyes:      Extraocular Movements: Extraocular movements intact.      Pupils: Pupils are equal, round, and reactive to light.   Cardiovascular:      Rate and Rhythm: Normal rate and regular rhythm.   Pulmonary:      Effort: Pulmonary effort is normal.      Breath sounds: Normal breath sounds.   Abdominal:      General: Abdomen is flat. Bowel sounds are normal.      Palpations: Abdomen is soft.   Musculoskeletal:         General: Normal range of motion.      Cervical back: Normal range of motion and neck supple.      Comments: Left shoulder deformity noted consistent with dislocation axillary nerve is intact radial pulses are intact   Skin:     General: Skin is warm.      Capillary Refill: Capillary refill takes less than 2 seconds.   Neurological:      General: No focal deficit present.      Mental Status: He is alert and oriented to person, place, and time. Mental status is at baseline.   Psychiatric:         Mood and Affect: Mood normal.         Thought Content: Thought content normal.         Vital Signs  ED Triage Vitals   Temperature Pulse Respirations Blood Pressure SpO2   08/21/24 1555 08/21/24 1555 08/21/24 1555 08/21/24 1555 08/21/24 1555   98.3 °F (36.8 °C) (!) 54 16 143/69 99 %      Temp Source Heart Rate Source Patient Position - Orthostatic VS BP Location FiO2 (%)   08/21/24 1555 08/21/24 1555 -- 08/21/24 1830 --   Oral Monitor  Right arm       Pain Score       08/21/24 1605       10 - Worst Possible Pain           Vitals:    08/21/24 1756 08/21/24 1815 08/21/24 1830 08/21/24 1900   BP: (!) 183/81 (!) 172/77 (!) 173/81 (!) 171/80   Pulse: 71 69 75 72         Visual Acuity      ED Medications  Medications   HYDROmorphone (DILAUDID) injection 1 mg (1 mg Intravenous Given 8/21/24 1605)   sodium chloride 0.9 % bolus 1,000 mL (0 mL  Intravenous Stopped 8/21/24 1915)   tetanus-diphtheria-acellular pertussis (BOOSTRIX) IM injection 0.5 mL (0.5 mL Intramuscular Given 8/21/24 1608)   HYDROmorphone (DILAUDID) injection 1 mg (1 mg Intravenous Given 8/21/24 1656)   iohexol (OMNIPAQUE) 350 MG/ML injection (MULTI-DOSE) 100 mL (100 mL Intravenous Given 8/21/24 1712)   fentaNYL injection 100 mcg (100 mcg Intravenous Given 8/21/24 1743)   ondansetron (ZOFRAN) injection 4 mg (4 mg Intravenous Given 8/21/24 1749)       Diagnostic Studies  Results Reviewed       Procedure Component Value Units Date/Time    Comprehensive metabolic panel [[id number]]  (Abnormal) Collected: 08/21/24 1621    Lab Status: Final result Specimen: Blood from Arm, Right Updated: 08/21/24 1647     Sodium 138 mmol/L      Potassium 3.8 mmol/L      Chloride 101 mmol/L      CO2 28 mmol/L      ANION GAP 9 mmol/L      BUN 14 mg/dL      Creatinine 1.31 mg/dL      Glucose 176 mg/dL      Calcium 9.2 mg/dL      AST 18 U/L      ALT 18 U/L      Alkaline Phosphatase 47 U/L      Total Protein 7.0 g/dL      Albumin 4.5 g/dL      Total Bilirubin 0.62 mg/dL      eGFR 53 ml/min/1.73sq m     Narrative:      National Kidney Disease Foundation guidelines for Chronic Kidney Disease (CKD):     Stage 1 with normal or high GFR (GFR > 90 mL/min/1.73 square meters)    Stage 2 Mild CKD (GFR = 60-89 mL/min/1.73 square meters)    Stage 3A Moderate CKD (GFR = 45-59 mL/min/1.73 square meters)    Stage 3B Moderate CKD (GFR = 30-44 mL/min/1.73 square meters)    Stage 4 Severe CKD (GFR = 15-29 mL/min/1.73 square meters)    Stage 5 End Stage CKD (GFR <15 mL/min/1.73 square meters)  Note: GFR calculation is accurate only with a steady state creatinine    CBC and differential [[id number]]  (Abnormal) Collected: 08/21/24 1621    Lab Status: Final result Specimen: Blood from Arm, Right Updated: 08/21/24 1625     WBC 9.03 Thousand/uL      RBC 5.07 Million/uL      Hemoglobin 14.6 g/dL      Hematocrit 44.3 %      MCV 87 fL       MCH 28.8 pg      MCHC 33.0 g/dL      RDW 13.5 %      MPV 10.1 fL      Platelets 283 Thousands/uL      nRBC 0 /100 WBCs      Segmented % 44 %      Immature Grans % 0 %      Lymphocytes % 42 %      Monocytes % 8 %      Eosinophils Relative 5 %      Basophils Relative 1 %      Absolute Neutrophils 3.85 Thousands/µL      Absolute Immature Grans 0.04 Thousand/uL      Absolute Lymphocytes 3.82 Thousands/µL      Absolute Monocytes 0.74 Thousand/µL      Eosinophils Absolute 0.47 Thousand/µL      Basophils Absolute 0.11 Thousands/µL                    CT head without contrast   Final Result by Nathan Moran MD (08/21 1726)         1. No acute intracranial abnormality.   2. Incidentally identified moderate-large left posterior fossa arachnoid cyst.                  Workstation performed: [id number]         CT spine cervical without contrast   Final Result by Felix Ma MD (08/21 1814)      No cervical spine fracture or traumatic malalignment.      Marked degenerative changes described above. Severe central spinal canal narrowing at C3-C4. Moderate to marked central spinal canal narrowing at C6-C7 and C7-T1.      Marked vascular calcification in the bilateral carotid bulbs.            Workstation performed: [id number]         CT facial bones without contrast   Final Result by Felix Ma MD (08/21 1804)      Mildly comminuted nasal bone fracture. Overlying soft tissue swelling of the nose.      No additional facial bone fracture.               Workstation performed: [id number]         CT chest abdomen pelvis w contrast   Final Result by Felix Ma MD (08/21 1828)      Anterior left shoulder dislocation. Small ossific densities lateral to the left glenohumeral joint probably represents small chip fractures.      Linear lucency along segment 6 of the liver that probably represents a focal area of hepatic scarring or focal fatty infiltration. Hepatic laceration is considered unlikely. No findings of  active contrast extravasation or perihepatic hematoma.      Probable small simple to complex left renal cyst. Follow-up CT scan recommended in 6 months.      Probable aorto biiliac insufficiency with small caliber of the distal abdominal aorta at the aortic bifurcation and probable greater than 70% stenosis of the proximal common iliac arteries at their origins. Further evaluation with dedicated CT    angiography or conventional angiography would be useful.      Workstation performed: [id number]         XR shoulder 2+ views LEFT    (Results Pending)   XR shoulder 1 vw left    (Results Pending)              Procedures  Orthopedic injury treatment    Date/Time: 8/21/2024 10:57 PM    Performed by: Jana Soto DO  Authorized by: Jana Soto DO    Patient Location:  ED  [place] Protocol:  Procedure performed by:  Consent: Verbal consent obtained.  Risks and benefits: risks, benefits and alternatives were discussed  Consent given by: patient  Patient identity confirmed: verbally with patient    Injury location:  Shoulder  Location details:  Left shoulder  Injury type:  Dislocation  Neurovascular status: Neurovascularly intact    Distal perfusion: normal    Neurological function: normal    Range of motion: normal    Local anesthesia used?: No    General anesthesia used?: No    Manipulation performed?: Yes    Reduction method: traction and counter traction  Skeletal traction used?: Yes    Reduction successful?: Yes    Confirmation: Reduction confirmed by x-ray    Immobilization:  Sling  Neurovascular status: Neurovascularly intact    Patient tolerance:  Patient tolerated the procedure well with no immediate complications           ED Course                                               Medical Decision Making  Patient evaluated with labs imaging.  I reviewed the results and discussed them with the patient.  Patient discharged with appropriate instructions medications and follow-up.  Patient verbalized understanding had  no further questions at the time of discharge.  Patient had stable vital signs and well-appearing at the time of discharge.    Problems Addressed:  Arachnoid cyst: acute illness or injury  Arterial stenosis (HCC): acute illness or injury  Fall, initial encounter: acute illness or injury  Shoulder dislocation: acute illness or injury    Amount and/or Complexity of Data Reviewed  External Data Reviewed: notes.  Labs: ordered. Decision-making details documented in ED Course.  Radiology: ordered and independent interpretation performed. Decision-making details documented in ED Course.     Details: Left shoulder dislocation    Risk  Prescription drug management.                 Disposition  Final diagnoses:   Fall, initial encounter   Shoulder dislocation   Arterial stenosis (HCC)   Arachnoid cyst     Time reflects when diagnosis was documented in both MDM as applicable and the Disposition within this note       Time User Action Codes Description Comment    8/21/2024  7:02 PM Anepu, Jana Add [W19.XXXA] Fall, initial encounter     8/21/2024  7:02 PM Anepu, Jana Add [S43.006A] Shoulder dislocation     8/21/2024  7:02 PM Anepu, Jana Add [I77.1] Arterial stenosis (HCC)     8/21/2024  7:02 PM Anepu, Jana Add [G93.0] Arachnoid cyst           ED Disposition       ED Disposition   Discharge    Condition   Stable    Date/Time   Wed Aug 21, 2024  6:59 PM    Comment   Santiago Rogers discharge to home/self care.                   Follow-up Information       Follow up With Specialties Details Why Contact Info    Isaac Shannon DO Family Medicine Schedule an appointment as soon as possible for a visit in 2 days  [address]  [address]  [facility] [id number] [phone]      Aleksandr Majano DO Orthopedic Surgery Schedule an appointment as soon as possible for a visit in 2 days  [address] [facility]  [facility] [id number] [phone]      Jerome Yo MD Neurosurgery   [address] [facility] [address] [facility] [facility]  [address]  [address]  [place] [address]  [phone]      Tato Mathew MD Otolaryngology   [address]  [address]  [place] [address]  [phone]              Discharge Medication List as of 8/21/2024  7:03 PM        START taking these medications    Details   amoxicillin-clavulanate (AUGMENTIN) 875-125 mg per tablet Take 1 tablet by mouth every 12 (twelve) hours for 10 days, Starting Wed 8/21/2024, Until Sat 8/31/2024, Normal           CONTINUE these medications which have NOT CHANGED    Details   amLODIPine (NORVASC) 10 mg tablet TAKE 1 TABLET BY MOUTH DAILY AT  BEDTIME, Starting Mon 1/15/2024, Normal      clopidogrel (PLAVIX) 75 mg tablet TAKE 1 TABLET BY MOUTH DAILY AT  BEDTIME, Normal      metoprolol tartrate (LOPRESSOR) 50 mg tablet TAKE 1 TABLET BY MOUTH TWICE  DAILY, Starting Mon 12/18/2023, Normal      nitroglycerin (Nitrostat) 0.4 mg SL tablet Place 1 tablet (0.4 mg total) under the tongue every 5 (five) minutes as needed for chest pain, Starting Wed 5/31/2023, Normal      rosuvastatin (CRESTOR) 20 MG tablet TAKE 1 TABLET BY MOUTH DAILY AT  BEDTIME, Starting Mon 12/18/2023, Normal      tadalafil (Cialis) 20 MG tablet Take 1 tablet (20 mg total) by mouth daily as needed for erectile dysfunction, Starting Tue 5/16/2023, Normal      triamcinolone (KENALOG) 0.1 % ointment APPLY TOPICALLY TWO TIMES A DAY TO HANDS (SHORT TERM USE) (GENERIC FOR KENALOG), Normal             No discharge procedures on file.    PDMP Review       None            ED Provider  Electronically Signed by             Jana Soto DO  08/21/24 3362

## 2024-08-23 ENCOUNTER — OFFICE VISIT (OUTPATIENT)
Dept: OBGYN CLINIC | Facility: CLINIC | Age: 74
End: 2024-08-23
Payer: COMMERCIAL

## 2024-08-23 VITALS
HEART RATE: 60 BPM | BODY MASS INDEX: 27.4 KG/M2 | WEIGHT: 185 LBS | SYSTOLIC BLOOD PRESSURE: 172 MMHG | DIASTOLIC BLOOD PRESSURE: 77 MMHG | HEIGHT: 69 IN

## 2024-08-23 DIAGNOSIS — S70.12XA CONTUSION OF LEFT THIGH, INITIAL ENCOUNTER: ICD-10-CM

## 2024-08-23 DIAGNOSIS — M24.812 INTERNAL DERANGEMENT OF LEFT SHOULDER: Primary | ICD-10-CM

## 2024-08-23 PROCEDURE — 99204 OFFICE O/P NEW MOD 45 MIN: CPT | Performed by: ORTHOPAEDIC SURGERY

## 2024-08-23 NOTE — PROGRESS NOTES
Orthopedic Sports Medicine New Patient Visit     Assesment:   74 y.o. male with left shoulder dislocation and traumatic rotator cuff tear. Also with left thigh contusion     Plan:    I had a long discussion about both his thigh and his shoulder.  He had normal shoulder function prior to this injury.  He now has significant weakness with all rotator cuff testing including a positive drop arm test.  I explained that a shoulder dislocation and his age has a very high association with rotator cuff tearing.  I did recommend an MRI to evaluate his rotator cuff and guide further treatment.    On his left thigh he has no medial posterior or anterior bony tenderness.  There is a area of swelling over the lateral quad.  He is tender in the soft tissues there only.  Is been able to bear weight without severe pain.  There is pain with extremes of motion but no significant pain with passive stretch.  His compartments are soft and compressible.  I believe this is consistent with a contusion.  Possible small hematoma given his use of a blood thinner and area of swelling there.  I recommended conservative treatment starting with stretches and continue weightbearing as tolerated.  I did discuss physical therapy for this.  Will hold off and just do self-directed exercises to start.  Has not this is reasonable.  I will see him back after his MRI to discuss further treatment plan.          Follow up:    No follow-ups on file.        Chief Complaint   Patient presents with    Left Thigh - Pain       History of Present Illness:    The patient is a 74 y.o., male presenting after after an injury to his shoulder and thigh.  He was working in his garage when he fell through a wood structure.  He tried to catch himself and dislocated his shoulder.  This was reduced to the emergency department.  His shoulder continues to be very painful today.  He is unable to lift it above shoulder height.  Prior to this injury his shoulder was normal and had  no significant pain or dysfunction.  When he fell and hit the structure his thigh also scraped on the piece of wood and developed a large degree of swelling there.  He has been able to bear weight with some pain.  This has been slowly improving.  Denies any significant numbness or tingling in either the arm or the leg.    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 (HCC)     16jan2009  last assessed    Fracture     right lower leg    Hypertension     Skin neoplasm     [id number] resolved    Wears glasses      Past Surgical History:   Procedure Laterality Date    ANGIOPLASTY      COLONOSCOPY      COLONOSCOPY N/A 9/10/2018    Procedure: COLONOSCOPY;  Surgeon: Prem Friedman MD;  Location: [facility] GI LAB;  Service: Gastroenterology    CORONARY ANGIOPLASTY WITH STENT PLACEMENT      rca stent    FRACTURE SURGERY Right     tibia,fibula fx repair    TONSILLECTOMY      age 6    US GUIDED THYROID BIOPSY  12/9/2019     Allergies   Allergen Reactions    Lisinopril Cough     Current Outpatient Medications on File Prior to Visit   Medication Sig Dispense Refill    amLODIPine (NORVASC) 10 mg tablet TAKE 1 TABLET BY MOUTH DAILY AT  BEDTIME 90 tablet 3    amoxicillin-clavulanate (AUGMENTIN) 875-125 mg per tablet Take 1 tablet by mouth every 12 (twelve) hours for 10 days 20 tablet 0    clopidogrel (PLAVIX) 75 mg tablet TAKE 1 TABLET BY MOUTH DAILY AT  BEDTIME 90 tablet 3    metoprolol tartrate (LOPRESSOR) 50 mg tablet TAKE 1 TABLET BY MOUTH TWICE  DAILY 180 tablet 3    nitroglycerin (Nitrostat) 0.4 mg SL tablet Place 1 tablet (0.4 mg total) under the tongue every 5 (five) minutes as needed for chest pain 25 tablet 0    rosuvastatin (CRESTOR) 20 MG tablet TAKE 1 TABLET BY MOUTH DAILY AT  BEDTIME 90  "tablet 3    tadalafil (Cialis) 20 MG tablet Take 1 tablet (20 mg total) by mouth daily as needed for erectile dysfunction 30 tablet 5    triamcinolone (KENALOG) 0.1 % ointment APPLY TOPICALLY TWO TIMES A DAY TO HANDS (SHORT TERM USE) (GENERIC FOR KENALOG) 80 g 5     No current facility-administered medications on file prior to visit.     Social History     Socioeconomic History    Marital status: /Civil Union     Spouse name: Not on file    Number of children: Not on file    Years of education: Not on file    Highest education level: Not on file   Occupational History    Not on file   Tobacco Use    Smoking status: Former     Current packs/day: 0.00     Average packs/day: 1 pack/day for 20.0 years (20.0 ttl pk-yrs)     Types: Cigarettes     Start date: 6/15/1966     Quit date: 6/15/1986     Years since quittin.2    Smokeless tobacco: Never   Vaping Use    Vaping status: Never Used   Substance and Sexual Activity    Alcohol use: Yes     Comment: occ    Drug use: No    Sexual activity: Not on file   Other Topics Concern    Not on file   Social History Narrative    Not on file     Social Determinants of Health     Financial Resource Strain: Low Risk  (2023)    Overall Financial Resource Strain (CARDIA)     Difficulty of Paying Living Expenses: Not hard at all   Food Insecurity: Not on file   Transportation Needs: No Transportation Needs (2023)    PRAPARE - Transportation     Lack of Transportation (Medical): No     Lack of Transportation (Non-Medical): No   Physical Activity: Not on file   Stress: Not on file   Social Connections: Not on file   Intimate Partner Violence: Not on file   Housing Stability: Not on file         I have reviewed the past medical, surgical, social and family history, medications and allergies as documented in the EMR.    Review of systems: ROS is negative other than that noted in the HPI.       Physical Exam:    Blood pressure (!) 172/77, pulse 60, height 5' 8.5\" (1.74 " m), weight 83.9 kg (185 lb).    General/Constitutional: NAD, well developed, well nourished  HENT: Normocephalic, atraumatic  CV: Intact distal pulses, regular rate  Resp: No respiratory distress or labored breathing  Abdomen: soft, nondistended, non tender   Lymphatic: No lymphadenopathy palpated  Neuro: Alert and Oriented x 3, no focal deficits  Psych: Normal mood, normal affect  Skin: Warm, dry, no rashes, no erythema      Shoulder Exam:      Inspection: No ecchymosis, edema, or deformity. No visualized wounds or skin lesions   Palpation: diffuse tenderness  Active Motion:       FF: 80 actively, 160 passively         ER: active 60        IR: L1  Strength: 3/5 empty can, 4/5 ER,  4/5 IR   Sensory - SILT in the Radial / Ulnar / Median / Axillary nerve distributions  Motor - AIN / PIN / Radial / Ulnar / Median / Axillary motor nerves in tact  Palpable Radial pulse  Cap refill <2secs in all digits        + Apprehension       LLE:     Dressed abrasion with no evidence of infection surrounding it  Focal swelling over the lateral mid thigh  Mild bruising in that area  Tenderness about the focal swelling, no medial or anterior posterior tenderness no bony tenderness when pushing from the medial side  Knee is stable to varus stress, varus stress, Lachman, posterior drawer.  No significant worsening of pain with 30 degree arc of mid motion of the knee.  There is pain beyond 30 degrees of flexion at the mid thigh.  Positive EHL/FHL/TA/GS  Sensation intact to light touch throughout  Palpable DP pulse          Imaging    I reviewed and interpreted X-rays of the left shoulder reviewed and interpreted showing no definite acute fractures.  There is densities adjacent to the glenoid that appear chronic in nature less likely acute fracture.

## 2024-08-27 ENCOUNTER — RA CDI HCC (OUTPATIENT)
Dept: OTHER | Facility: HOSPITAL | Age: 74
End: 2024-08-27

## 2024-08-29 ENCOUNTER — TELEPHONE (OUTPATIENT)
Dept: FAMILY MEDICINE CLINIC | Facility: CLINIC | Age: 74
End: 2024-08-29

## 2024-08-29 PROBLEM — B07.0 PLANTAR WART OF RIGHT FOOT: Status: RESOLVED | Noted: 2022-05-13 | Resolved: 2024-08-29

## 2024-08-29 PROBLEM — M48.02 SPINAL STENOSIS, CERVICAL REGION: Status: ACTIVE | Noted: 2024-08-29

## 2024-08-30 ENCOUNTER — TELEPHONE (OUTPATIENT)
Dept: FAMILY MEDICINE CLINIC | Facility: CLINIC | Age: 74
End: 2024-08-30

## 2024-08-30 ENCOUNTER — OFFICE VISIT (OUTPATIENT)
Dept: FAMILY MEDICINE CLINIC | Facility: CLINIC | Age: 74
End: 2024-08-30
Payer: COMMERCIAL

## 2024-08-30 VITALS
DIASTOLIC BLOOD PRESSURE: 72 MMHG | HEIGHT: 69 IN | WEIGHT: 179.6 LBS | BODY MASS INDEX: 26.6 KG/M2 | HEART RATE: 60 BPM | SYSTOLIC BLOOD PRESSURE: 144 MMHG | TEMPERATURE: 98 F | RESPIRATION RATE: 16 BRPM

## 2024-08-30 DIAGNOSIS — R93.89 ABNORMAL CT SCAN, NECK: ICD-10-CM

## 2024-08-30 DIAGNOSIS — N28.1 RENAL CYST, LEFT: ICD-10-CM

## 2024-08-30 DIAGNOSIS — Z00.00 MEDICARE ANNUAL WELLNESS VISIT, SUBSEQUENT: Primary | ICD-10-CM

## 2024-08-30 DIAGNOSIS — I25.10 CORONARY ARTERY DISEASE INVOLVING NATIVE CORONARY ARTERY OF NATIVE HEART WITHOUT ANGINA PECTORIS: ICD-10-CM

## 2024-08-30 DIAGNOSIS — I10 BENIGN ESSENTIAL HYPERTENSION: ICD-10-CM

## 2024-08-30 DIAGNOSIS — I77.89 OTHER SPECIFIED DISORDERS OF ARTERIES AND ARTERIOLES (HCC): ICD-10-CM

## 2024-08-30 DIAGNOSIS — R93.5 ABNORMAL CT OF THE ABDOMEN: ICD-10-CM

## 2024-08-30 PROCEDURE — G0439 PPPS, SUBSEQ VISIT: HCPCS | Performed by: FAMILY MEDICINE

## 2024-08-30 PROCEDURE — 99214 OFFICE O/P EST MOD 30 MIN: CPT | Performed by: FAMILY MEDICINE

## 2024-08-30 NOTE — PROGRESS NOTES
Assessment/Plan:    No problem-specific Assessment & Plan notes found for this encounter.    Left shoulder hill-sachs deformity   Seeing ortho  R/o RTC tear, MRI pending    Possible carotid dz on CT neck  Check duplex  No bruits    Possible iliac artery stenosis  Check CTA   Consider vascular surgeon if confirmed  Already on plavix and statin  Denies claudication    Nasal bone fx  F/u ENT    Left arachnoid cyst  Pt to call neurosurgeon, info given in ER    Left renal cyst  F/u CT in 6m ordered per recommendations       Diagnoses and all orders for this visit:    Medicare annual wellness visit, subsequent    Renal cyst, left  -     CT abdomen wo contrast; Future    Abnormal CT of the abdomen  -     CTA abdomen pelvis w wo contrast; Future    Abnormal CT scan, neck  -     VAS carotid complete study; Future    Other specified disorders of arteries and arterioles (HCC)  -     VAS carotid complete study; Future    Benign essential hypertension    Coronary artery disease involving native coronary artery of native heart without angina pectoris        No follow-ups on file.    Subjective:      Patient ID: Santiago Rogers is a 74 y.o. male.    Chief Complaint   Patient presents with    Follow-up     Test results Sara Matias LPN      Medicare Wellness Visit       HPI  Fell through a floor board  Shoulder dislocation, left hematoma, nasal fracture  Mri planned    Juror duty excuse requested  Feels he just can't do it  No claudication    The following portions of the patient's history were reviewed and updated as appropriate: allergies, current medications, past family history, past medical history, past social history, past surgical history and problem list.    Review of Systems   Constitutional:  Negative for chills and fever.   Neurological:  Negative for dizziness.         Current Outpatient Medications   Medication Sig Dispense Refill    amLODIPine (NORVASC) 10 mg tablet TAKE 1 TABLET BY MOUTH DAILY AT  BEDTIME 90 tablet  "3    amoxicillin-clavulanate (AUGMENTIN) 875-125 mg per tablet Take 1 tablet by mouth every 12 (twelve) hours for 10 days 20 tablet 0    clopidogrel (PLAVIX) 75 mg tablet TAKE 1 TABLET BY MOUTH DAILY AT  BEDTIME 90 tablet 3    metoprolol tartrate (LOPRESSOR) 50 mg tablet TAKE 1 TABLET BY MOUTH TWICE  DAILY 180 tablet 3    nitroglycerin (Nitrostat) 0.4 mg SL tablet Place 1 tablet (0.4 mg total) under the tongue every 5 (five) minutes as needed for chest pain 25 tablet 0    rosuvastatin (CRESTOR) 20 MG tablet TAKE 1 TABLET BY MOUTH DAILY AT  BEDTIME 90 tablet 3    tadalafil (Cialis) 20 MG tablet Take 1 tablet (20 mg total) by mouth daily as needed for erectile dysfunction 30 tablet 5    triamcinolone (KENALOG) 0.1 % ointment APPLY TOPICALLY TWO TIMES A DAY TO HANDS (SHORT TERM USE) (GENERIC FOR KENALOG) 80 g 5     No current facility-administered medications for this visit.       Objective:    /72   Pulse 60   Temp 98 °F (36.7 °C)   Resp 16   Ht 5' 8.5\" (1.74 m)   Wt 81.5 kg (179 lb 9.6 oz)   BMI 26.91 kg/m²        Physical Exam  Vitals and nursing note reviewed.   Constitutional:       General: He is not in acute distress.     Appearance: He is well-developed. He is not ill-appearing.   HENT:      Head: Normocephalic.      Right Ear: Tympanic membrane normal.      Left Ear: Tympanic membrane normal.   Eyes:      General: No scleral icterus.     Conjunctiva/sclera: Conjunctivae normal.   Neck:      Vascular: No carotid bruit.   Cardiovascular:      Rate and Rhythm: Normal rate and regular rhythm.      Pulses: Normal pulses.   Pulmonary:      Effort: Pulmonary effort is normal. No respiratory distress.      Breath sounds: No wheezing.   Abdominal:      Palpations: Abdomen is soft.   Musculoskeletal:         General: No deformity.      Cervical back: Neck supple.      Right lower leg: No edema.      Left lower leg: No edema.   Skin:     General: Skin is warm and dry.   Neurological:      Mental Status: He is " alert.      Motor: No weakness.      Gait: Gait normal.   Psychiatric:         Mood and Affect: Mood normal.         Behavior: Behavior normal.         Thought Content: Thought content normal.                Isaac Shannon DO

## 2024-08-30 NOTE — TELEPHONE ENCOUNTER
Patient stated he wanted to be seen for the test results.   Seeing ortho for everything else..   Not scheduled for an er fu.

## 2024-08-30 NOTE — TELEPHONE ENCOUNTER
Dr Shannon    Patient is not coming in to discuss his er visit.   He just wants his previous test results.

## 2024-08-31 PROBLEM — R93.5 ABNORMAL CT OF THE ABDOMEN: Status: ACTIVE | Noted: 2024-08-31

## 2024-08-31 PROBLEM — Z00.00 MEDICARE ANNUAL WELLNESS VISIT, SUBSEQUENT: Status: ACTIVE | Noted: 2024-08-31

## 2024-08-31 PROBLEM — R93.89 ABNORMAL CT SCAN, NECK: Status: ACTIVE | Noted: 2024-08-31

## 2024-08-31 PROBLEM — I77.89 OTHER SPECIFIED DISORDERS OF ARTERIES AND ARTERIOLES (HCC): Status: ACTIVE | Noted: 2024-08-31

## 2024-08-31 PROBLEM — N28.1 RENAL CYST, LEFT: Status: ACTIVE | Noted: 2024-08-31

## 2024-08-31 NOTE — PROGRESS NOTES
Ambulatory Visit  Name: Santiago Rogers      : 1950      MRN: [id number]  Encounter Provider: Isaac Shannon DO  Encounter Date: 2024   Encounter department: [facility]    Assessment & Plan   1. Medicare annual wellness visit, subsequent  2. Renal cyst, left  -     CT abdomen wo contrast; Future; Expected date: 2025  3. Abnormal CT of the abdomen  -     CTA abdomen pelvis w wo contrast; Future; Expected date: 2024  4. Abnormal CT scan, neck  -     VAS carotid complete study; Future; Expected date: 2024  5. Other specified disorders of arteries and arterioles (HCC)  -     VAS carotid complete study; Future; Expected date: 2024  6. Benign essential hypertension  7. Coronary artery disease involving native coronary artery of native heart without angina pectoris       Preventive health issues were discussed with patient, and age appropriate screening tests were ordered as noted in patient's After Visit Summary. Personalized health advice and appropriate referrals for health education or preventive services given if needed, as noted in patient's After Visit Summary.    History of Present Illness     HPI   Patient Care Team:  Isaac Shannon DO as PCP - General  MD Isaac Florez DO Barry Eugene Herman, MD as Endoscopist    Review of Systems  Medical History Reviewed by provider this encounter:  Tobacco  Allergies  Meds  Problems  Med Hx  Surg Hx  Fam Hx       Annual Wellness Visit Questionnaire   Santiago is here for his Subsequent Wellness visit. Last Medicare Wellness visit information reviewed, patient interviewed and updates made to the record today.      Health Risk Assessment:   Patient rates overall health as very good. Patient feels that their physical health rating is same. Patient is very satisfied with their life. Eyesight was rated as same. Hearing was rated as same. Patient feels that their emotional and mental health rating is same.  Patients states they are never, rarely angry. Patient states they are never, rarely unusually tired/fatigued. Pain experienced in the last 7 days has been some. Patient's pain rating has been 10/10. Patient states that he has experienced no weight loss or gain in last 6 months.     Depression Screening:   PHQ-2 Score: 0      Fall Risk Screening:   In the past year, patient has experienced: history of falling in past year    Number of falls: 1  Injured during fall?: Yes    Feels unsteady when standing or walking?: No    Worried about falling?: No      Urinary Incontinence Screening:   Patient has not leaked urine accidently in the last six months.     Home Safety:  Patient does not have trouble with stairs inside or outside of their home. Patient has working smoke alarms and has working carbon monoxide detector. Home safety hazards include: none.     Nutrition:   Current diet is Regular, Low Cholesterol, Low Saturated Fat and No Added Salt.     Medications:   Patient is not currently taking any over-the-counter supplements. Patient is able to manage medications.     Activities of Daily Living (ADLs)/Instrumental Activities of Daily Living (IADLs):   Walk and transfer into and out of bed and chair?: Yes  Dress and groom yourself?: Yes    Bathe or shower yourself?: Yes    Feed yourself? Yes  Do your laundry/housekeeping?: Yes  Manage your money, pay your bills and track your expenses?: Yes  Make your own meals?: Yes    Do your own shopping?: Yes    Previous Hospitalizations:   Any hospitalizations or ED visits within the last 12 months?: No      Advance Care Planning:   Living will: No    Durable POA for healthcare: Yes    Advanced directive: Yes    End of Life Decisions reviewed with patient: No      Cognitive Screening:   Provider or family/friend/caregiver concerned regarding cognition?: No    PREVENTIVE SCREENINGS      Cardiovascular Screening:    General: Screening Not Indicated and History Lipid Disorder       Diabetes Screening:     General: Screening Current      Colorectal Cancer Screening:     General: Screening Current      Prostate Cancer Screening:    General: History Prostate Cancer and Screening Not Indicated      Osteoporosis Screening:    General: Screening Not Indicated      Abdominal Aortic Aneurysm (AAA) Screening:    Risk factors include: age between 65-74 yo and tobacco use        General: Screening Not Indicated      Lung Cancer Screening:     General: Screening Not Indicated      Hepatitis C Screening:    General: Patient Declines    Hep C Screening Accepted: No     Screening, Brief Intervention, and Referral to Treatment (SBIRT)    Screening  Typical number of drinks in a day: 1  Typical number of drinks in a week: 1  Interpretation: Low risk drinking behavior.    Single Item Drug Screening:  How often have you used an illegal drug (including marijuana) or a prescription medication for non-medical reasons in the past year? never    Single Item Drug Screen Score: 0  Interpretation: Negative screen for possible drug use disorder    Other Counseling Topics:   Car/seat belt/driving safety and regular weightbearing exercise.     Social Determinants of Health     Financial Resource Strain: Low Risk  (5/17/2023)    Overall Financial Resource Strain (CARDIA)     Difficulty of Paying Living Expenses: Not hard at all   Food Insecurity: No Food Insecurity (8/31/2024)    Hunger Vital Sign     Worried About Running Out of Food in the Last Year: Never true     Ran Out of Food in the Last Year: Never true   Transportation Needs: No Transportation Needs (8/31/2024)    PRAPARE - Transportation     Lack of Transportation (Medical): No     Lack of Transportation (Non-Medical): No   Housing Stability: Low Risk  (8/31/2024)    Housing Stability Vital Sign     Unable to Pay for Housing in the Last Year: No     Number of Times Moved in the Last Year: 0     Homeless in the Last Year: No   Utilities: Not At Risk (8/31/2024)     "[facility] Utilities     Threatened with loss of utilities: No     No results found.    Objective     /72   Pulse 60   Temp 98 °F (36.7 °C)   Resp 16   Ht 5' 8.5\" (1.74 m)   Wt 81.5 kg (179 lb 9.6 oz)   BMI 26.91 kg/m²     Physical Exam      "

## 2024-08-31 NOTE — PATIENT INSTRUCTIONS
Medicare Preventive Visit Patient Instructions  Thank you for completing your Welcome to Medicare Visit or Medicare Annual Wellness Visit today. Your next wellness visit will be due in one year (9/1/2025).  The screening/preventive services that you may require over the next 5-10 years are detailed below. Some tests may not apply to you based off risk factors and/or age. Screening tests ordered at today's visit but not completed yet may show as past due. Also, please note that scanned in results may not display below.  Preventive Screenings:  Service Recommendations Previous Testing/Comments   Colorectal Cancer Screening  Colonoscopy    Fecal Occult Blood Test (FOBT)/Fecal Immunochemical Test (FIT)  Fecal DNA/Cologuard Test  Flexible Sigmoidoscopy Age: 45-75 years old   Colonoscopy: every 10 years (May be performed more frequently if at higher risk)  OR  FOBT/FIT: every 1 year  OR  Cologuard: every 3 years  OR  Sigmoidoscopy: every 5 years  Screening may be recommended earlier than age 45 if at higher risk for colorectal cancer. Also, an individualized decision between you and your healthcare provider will decide whether screening between the ages of 76-85 would be appropriate. Colonoscopy: 09/10/2018  FOBT/FIT: Not on file  Cologuard: Not on file  Sigmoidoscopy: Not on file          Prostate Cancer Screening Individualized decision between patient and health care provider in men between ages of 55-69   Medicare will cover every 12 months beginning on the day after your 50th birthday PSA: 1.5 ng/mL           Hepatitis C Screening Once for adults born between 1945 and 1965  More frequently in patients at high risk for Hepatitis C Hep C Antibody: Not on file        Diabetes Screening 1-2 times per year if you're at risk for diabetes or have pre-diabetes Fasting glucose: No results in last 5 years (No results in last 5 years)  A1C: 6.3 % (3/16/2023)      Cholesterol Screening Once every 5 years if you don't have a  lipid disorder. May order more often based on risk factors. Lipid panel: 03/16/2023         Other Preventive Screenings Covered by Medicare:  Abdominal Aortic Aneurysm (AAA) Screening: covered once if your at risk. You're considered to be at risk if you have a family history of AAA or a male between the age of 65-75 who smoking at least 100 cigarettes in your lifetime.  Lung Cancer Screening: covers low dose CT scan once per year if you meet all of the following conditions: (1) Age 55-77; (2) No signs or symptoms of lung cancer; (3) Current smoker or have quit smoking within the last 15 years; (4) You have a tobacco smoking history of at least 20 pack years (packs per day x number of years you smoked); (5) You get a written order from a healthcare provider.  Glaucoma Screening: covered annually if you're considered high risk: (1) You have diabetes OR (2) Family history of glaucoma OR (3)  aged 50 and older OR (4)  American aged 65 and older  Osteoporosis Screening: covered every 2 years if you meet one of the following conditions: (1) Have a vertebral abnormality; (2) On glucocorticoid therapy for more than 3 months; (3) Have primary hyperparathyroidism; (4) On osteoporosis medications and need to assess response to drug therapy.  HIV Screening: covered annually if you're between the age of 15-65. Also covered annually if you are younger than 15 and older than 65 with risk factors for HIV infection. For pregnant patients, it is covered up to 3 times per pregnancy.    Immunizations:  Immunization Recommendations   Influenza Vaccine Annual influenza vaccination during flu season is recommended for all persons aged >= 6 months who do not have contraindications   Pneumococcal Vaccine   * Pneumococcal conjugate vaccine = PCV13 (Prevnar 13), PCV15 (Vaxneuvance), PCV20 (Prevnar 20)  * Pneumococcal polysaccharide vaccine = PPSV23 (Pneumovax) Adults 19-63 yo with certain risk factors or if 65+ yo  If  never received any pneumonia vaccine: recommend Prevnar 20 (PCV20)  Give PCV20 if previously received 1 dose of PCV13 or PPSV23   Hepatitis B Vaccine 3 dose series if at intermediate or high risk (ex: diabetes, end stage renal disease, liver disease)   Respiratory syncytial virus (RSV) Vaccine - COVERED BY MEDICARE PART D  * RSVPreF3 (Arexvy) CDC recommends that adults 60 years of age and older may receive a single dose of RSV vaccine using shared clinical decision-making (SCDM)   Tetanus (Td) Vaccine - COST NOT COVERED BY MEDICARE PART B Following completion of primary series, a booster dose should be given every 10 years to maintain immunity against tetanus. Td may also be given as tetanus wound prophylaxis.   Tdap Vaccine - COST NOT COVERED BY MEDICARE PART B Recommended at least once for all adults. For pregnant patients, recommended with each pregnancy.   Shingles Vaccine (Shingrix) - COST NOT COVERED BY MEDICARE PART B  2 shot series recommended in those 19 years and older who have or will have weakened immune systems or those 50 years and older     Health Maintenance Due:      Topic Date Due   • Hepatitis C Screening  Never done   • Colorectal Cancer Screening  09/10/2028     Immunizations Due:      Topic Date Due   • Pneumococcal Vaccine: 65+ Years (1 of 2 - PCV) Never done   • COVID-19 Vaccine (3 - 2023-24 season) 09/01/2023   • Influenza Vaccine (1) 09/01/2024     Advance Directives   What are advance directives?  Advance directives are legal documents that state your wishes and plans for medical care. These plans are made ahead of time in case you lose your ability to make decisions for yourself. Advance directives can apply to any medical decision, such as the treatments you want, and if you want to donate organs.   What are the types of advance directives?  There are many types of advance directives, and each state has rules about how to use them. You may choose a combination of any of the  following:  Living will:  This is a written record of the treatment you want. You can also choose which treatments you do not want, which to limit, and which to stop at a certain time. This includes surgery, medicine, IV fluid, and tube feedings.   Durable power of  for healthcare (DPAHC):  This is a written record that states who you want to make healthcare choices for you when you are unable to make them for yourself. This person, called a proxy, is usually a family member or a friend. You may choose more than 1 proxy.  Do not resuscitate (DNR) order:  A DNR order is used in case your heart stops beating or you stop breathing. It is a request not to have certain forms of treatment, such as CPR. A DNR order may be included in other types of advance directives.  Medical directive:  This covers the care that you want if you are in a coma, near death, or unable to make decisions for yourself. You can list the treatments you want for each condition. Treatment may include pain medicine, surgery, blood transfusions, dialysis, IV or tube feedings, and a ventilator (breathing machine).  Values history:  This document has questions about your views, beliefs, and how you feel and think about life. This information can help others choose the care that you would choose.  Why are advance directives important?  An advance directive helps you control your care. Although spoken wishes may be used, it is better to have your wishes written down. Spoken wishes can be misunderstood, or not followed. Treatments may be given even if you do not want them. An advance directive may make it easier for your family to make difficult choices about your care.   Weight Management   Why it is important to manage your weight:  Being overweight increases your risk of health conditions such as heart disease, high blood pressure, type 2 diabetes, and certain types of cancer. It can also increase your risk for osteoarthritis, sleep apnea, and  other respiratory problems. Aim for a slow, steady weight loss. Even a small amount of weight loss can lower your risk of health problems.  How to lose weight safely:  A safe and healthy way to lose weight is to eat fewer calories and get regular exercise. You can lose up about 1 pound a week by decreasing the number of calories you eat by 500 calories each day.   Healthy meal plan for weight management:  A healthy meal plan includes a variety of foods, contains fewer calories, and helps you stay healthy. A healthy meal plan includes the following:  Eat whole-grain foods more often.  A healthy meal plan should contain fiber. Fiber is the part of grains, fruits, and vegetables that is not broken down by your body. Whole-grain foods are healthy and provide extra fiber in your diet. Some examples of whole-grain foods are whole-wheat breads and pastas, oatmeal, brown rice, and bulgur.  Eat a variety of vegetables every day.  Include dark, leafy greens such as spinach, kale, della greens, and mustard greens. Eat yellow and orange vegetables such as carrots, sweet potatoes, and winter squash.   Eat a variety of fruits every day.  Choose fresh or canned fruit (canned in its own juice or light syrup) instead of juice. Fruit juice has very little or no fiber.  Eat low-fat dairy foods.  Drink fat-free (skim) milk or 1% milk. Eat fat-free yogurt and low-fat cottage cheese. Try low-fat cheeses such as mozzarella and other reduced-fat cheeses.  Choose meat and other protein foods that are low in fat.  Choose beans or other legumes such as split peas or lentils. Choose fish, skinless poultry (chicken or turkey), or lean cuts of red meat (beef or pork). Before you cook meat or poultry, cut off any visible fat.   Use less fat and oil.  Try baking foods instead of frying them. Add less fat, such as margarine, sour cream, regular salad dressing and mayonnaise to foods. Eat fewer high-fat foods. Some examples of high-fat foods  include french fries, doughnuts, ice cream, and cakes.  Eat fewer sweets.  Limit foods and drinks that are high in sugar. This includes candy, cookies, regular soda, and sweetened drinks.  Exercise:  Exercise at least 30 minutes per day on most days of the week. Some examples of exercise include walking, biking, dancing, and swimming. You can also fit in more physical activity by taking the stairs instead of the elevator or parking farther away from stores. Ask your healthcare provider about the best exercise plan for you.      © Copyright [employer] 2018 Information is for End User's use only and may not be sold, redistributed or otherwise used for commercial purposes. All illustrations and images included in CareNotes® are the copyrighted property of [employer].A[employer] Inc[employer] or [employer]

## 2024-09-01 ENCOUNTER — HOSPITAL ENCOUNTER (OUTPATIENT)
Dept: MRI IMAGING | Facility: HOSPITAL | Age: 74
Discharge: HOME | End: 2024-09-01
Attending: ORTHOPAEDIC SURGERY
Payer: COMMERCIAL

## 2024-09-01 DIAGNOSIS — M24.812 INTERNAL DERANGEMENT OF LEFT SHOULDER: ICD-10-CM

## 2024-09-01 PROCEDURE — 73221 MRI JOINT UPR EXTREM W/O DYE: CPT

## 2024-09-05 ENCOUNTER — TELEPHONE (OUTPATIENT)
Dept: OBGYN CLINIC | Facility: CLINIC | Age: 74
End: 2024-09-05

## 2024-09-05 DIAGNOSIS — S42.142S GLENOID FRACTURE OF SHOULDER, LEFT, SEQUELA: Primary | ICD-10-CM

## 2024-09-05 DIAGNOSIS — S42.152S GLENOID FRACTURE OF SHOULDER, LEFT, SEQUELA: Primary | ICD-10-CM

## 2024-09-05 NOTE — TELEPHONE ENCOUNTER
m for patient to call back regarding MRI results. Dr. Suarez has ordered CT scan to further evaluate glenoid fracture. He would like patient to get this done ASAP and then reschedule FU appointment so that both MRI and CT are complete before patient comes back into office. If patient calls back, please transfer to office.

## 2024-09-05 NOTE — TELEPHONE ENCOUNTER
----- Message from Clint Suarez MD sent at 9/5/2024  3:07 PM EDT -----  CT scan ordered as discussed.

## 2024-09-06 ENCOUNTER — HOSPITAL ENCOUNTER (OUTPATIENT)
Dept: RADIOLOGY | Facility: HOSPITAL | Age: 74
Discharge: HOME | End: 2024-09-06
Attending: ORTHOPAEDIC SURGERY
Payer: COMMERCIAL

## 2024-09-06 DIAGNOSIS — S42.152S GLENOID FRACTURE OF SHOULDER, LEFT, SEQUELA: ICD-10-CM

## 2024-09-06 DIAGNOSIS — S42.142S GLENOID FRACTURE OF SHOULDER, LEFT, SEQUELA: ICD-10-CM

## 2024-09-06 PROCEDURE — 73200 CT UPPER EXTREMITY W/O DYE: CPT

## 2024-09-10 ENCOUNTER — CONSULT (OUTPATIENT)
Dept: NEUROSURGERY | Facility: CLINIC | Age: 74
End: 2024-09-10
Payer: COMMERCIAL

## 2024-09-10 VITALS
TEMPERATURE: 98.6 F | OXYGEN SATURATION: 96 % | WEIGHT: 179 LBS | BODY MASS INDEX: 26.51 KG/M2 | SYSTOLIC BLOOD PRESSURE: 142 MMHG | RESPIRATION RATE: 16 BRPM | HEART RATE: 59 BPM | DIASTOLIC BLOOD PRESSURE: 80 MMHG | HEIGHT: 69 IN

## 2024-09-10 DIAGNOSIS — G93.0 ARACHNOID CYST: Primary | ICD-10-CM

## 2024-09-10 PROCEDURE — 99203 OFFICE O/P NEW LOW 30 MIN: CPT | Performed by: PHYSICIAN ASSISTANT

## 2024-09-10 NOTE — ASSESSMENT & PLAN NOTE
Patient recently evaluated in the hospital s/p fall, found have have large L posterior fossa arachnoid cyst  No other brain imaging available for comparison  No complaints of gait abnormality, dizziness, nausea, vomiting, coordination difficulty  On exam, GCS 15 with nonfocal neuro exam. LUE not assessed 2/2 shoulder injury     Imaging:  CT head w/o, 8/21/24: Incidentally identified moderate-large left posterior fossa arachnoid cyst     Plan:  Imaging reviewed with patient and his wife in the room today and all questions answered  Discussed natural history of arachnoid cysts.  These are congenital findings, usually found incidentally.  No neurosurgical intervention recommended in the absence of mass effect or symptoms. Symptoms would include headache, nausea/vomiting, lethargy, seizures, gait or balance issues, etc).  Will obtain MRI brain w/wo in 3 months for comparison. If no change, which is expected, can follow up on an as needed basis at that time.    Orders:    MRI brain with and without contrast; Future    Basic metabolic panel; Future    Basic metabolic panel

## 2024-09-10 NOTE — PROGRESS NOTES
Ambulatory Visit  Name: Santiago Rogers      : 1950      MRN: [id number]  Encounter Provider: Molly Mack PA-C  Encounter Date: 9/10/2024   Encounter department: [facility] [place]    Assessment & Plan  Arachnoid cyst  Patient recently evaluated in the hospital s/p fall, found have have large L posterior fossa arachnoid cyst  No other brain imaging available for comparison  No complaints of gait abnormality, dizziness, nausea, vomiting, coordination difficulty  On exam, GCS 15 with nonfocal neuro exam. LUE not assessed 2/2 shoulder injury     Imaging:  CT head w/o, 24: Incidentally identified moderate-large left posterior fossa arachnoid cyst     Plan:  Imaging reviewed with patient and his wife in the room today and all questions answered  Discussed natural history of arachnoid cysts.  These are congenital findings, usually found incidentally.  No neurosurgical intervention recommended in the absence of mass effect or symptoms. Symptoms would include headache, nausea/vomiting, lethargy, seizures, gait or balance issues, etc).  Will obtain MRI brain w/wo in 3 months for comparison. If no change, which is expected, can follow up on an as needed basis at that time.    Orders:    MRI brain with and without contrast; Future    Basic metabolic panel; Future    Basic metabolic panel      History of Present Illness   This is a 73 yo male with a PMH significant for HTN who fell through the floorboards in a loft in 2024 and hit his left side. He was evaluated in the ED and was found to have a L shoulder dislocation as well as an incidental finding of L posterior fossa arachnoid cyst. He presents today for evaluation. He denies prior cranial imaging in the past. He was not aware of this arachnoid cyst. He denies any symptoms related to posterior fossa pathology including gait disturbance, falls, coordination issues, nausea, vomiting.       Review of Systems   HENT:  Positive for  tinnitus (Hx of).    Neurological:         Pt with fall today about 3-4 from loft through floorboard, hit head and left side, shoulder pain, left leg pain. Pt on plavix.    Left arachnoid cyst- INCIDENTAL FINDING   All other systems reviewed and are negative.    I have personally reviewed the MA's review of systems and made changes as necessary.    Past Medical History   Past Medical History:   Diagnosis Date    Chest pain     25nov2008  last assessed    Congenital anomaly of coronary artery     16Jan2009  last assessed    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 (HCC)     16jan2009  last assessed    Fracture     right lower leg    Hypertension     Skin neoplasm     [id number] resolved    Wears glasses      Past Surgical History:   Procedure Laterality Date    ANGIOPLASTY      COLONOSCOPY      COLONOSCOPY N/A 9/10/2018    Procedure: COLONOSCOPY;  Surgeon: Prem Friedman MD;  Location: [facility];  Service: Gastroenterology    CORONARY ANGIOPLASTY WITH STENT PLACEMENT      rca stent    FRACTURE SURGERY Right     tibia,fibula fx repair    TONSILLECTOMY      age 6    US GUIDED THYROID BIOPSY  12/9/2019     Family History   Problem Relation Age of Onset    Coronary artery disease Father     Heart disease Father     Hypertension Father     Macular degeneration Father     Diabetes Sister     Heart disease Brother         CABG age 46,     Current Outpatient Medications on File Prior to Visit   Medication Sig Dispense Refill    amLODIPine (NORVASC) 10 mg tablet TAKE 1 TABLET BY MOUTH DAILY AT  BEDTIME 90 tablet 3    clopidogrel (PLAVIX) 75 mg tablet TAKE 1 TABLET BY MOUTH DAILY AT  BEDTIME 90 tablet 3    metoprolol tartrate (LOPRESSOR) 50 mg tablet TAKE 1 TABLET BY MOUTH TWICE  DAILY 180 tablet 3    nitroglycerin (Nitrostat) 0.4 mg SL tablet Place 1 tablet (0.4 mg total) under the tongue every 5 (five) minutes as needed for chest pain 25 tablet 0    rosuvastatin (CRESTOR) 20 MG tablet TAKE 1 TABLET BY MOUTH  DAILY AT  BEDTIME 90 tablet 3    tadalafil (Cialis) 20 MG tablet Take 1 tablet (20 mg total) by mouth daily as needed for erectile dysfunction 30 tablet 5    triamcinolone (KENALOG) 0.1 % ointment APPLY TOPICALLY TWO TIMES A DAY TO HANDS (SHORT TERM USE) (GENERIC FOR KENALOG) 80 g 5     No current facility-administered medications on file prior to visit.     Allergies   Allergen Reactions    Lisinopril Cough      Current Outpatient Medications on File Prior to Visit   Medication Sig Dispense Refill    amLODIPine (NORVASC) 10 mg tablet TAKE 1 TABLET BY MOUTH DAILY AT  BEDTIME 90 tablet 3    clopidogrel (PLAVIX) 75 mg tablet TAKE 1 TABLET BY MOUTH DAILY AT  BEDTIME 90 tablet 3    metoprolol tartrate (LOPRESSOR) 50 mg tablet TAKE 1 TABLET BY MOUTH TWICE  DAILY 180 tablet 3    nitroglycerin (Nitrostat) 0.4 mg SL tablet Place 1 tablet (0.4 mg total) under the tongue every 5 (five) minutes as needed for chest pain 25 tablet 0    rosuvastatin (CRESTOR) 20 MG tablet TAKE 1 TABLET BY MOUTH DAILY AT  BEDTIME 90 tablet 3    tadalafil (Cialis) 20 MG tablet Take 1 tablet (20 mg total) by mouth daily as needed for erectile dysfunction 30 tablet 5    triamcinolone (KENALOG) 0.1 % ointment APPLY TOPICALLY TWO TIMES A DAY TO HANDS (SHORT TERM USE) (GENERIC FOR KENALOG) 80 g 5     No current facility-administered medications on file prior to visit.      Social History     Tobacco Use    Smoking status: Former     Current packs/day: 0.00     Average packs/day: 1 pack/day for 20.0 years (20.0 ttl pk-yrs)     Types: Cigarettes     Start date: 6/15/1966     Quit date: 6/15/1986     Years since quittin.2     Passive exposure: Past    Smokeless tobacco: Never   Vaping Use    Vaping status: Never Used   Substance and Sexual Activity    Alcohol use: Yes     Comment: occ    Drug use: No    Sexual activity: Not on file     Objective     /80 (BP Location: Right arm)   Pulse 59   Temp 98.6 °F (37 °C) (Temporal)   Resp 16   Ht 5'  "8.5\" (1.74 m)   Wt 81.2 kg (179 lb)   SpO2 96%   BMI 26.82 kg/m²     Physical Exam  Vitals and nursing note reviewed.   Constitutional:       Appearance: Normal appearance. He is well-developed and normal weight.   HENT:      Head: Normocephalic and atraumatic.   Eyes:      Extraocular Movements: Extraocular movements intact.      Pupils: Pupils are equal, round, and reactive to light.   Cardiovascular:      Rate and Rhythm: Normal rate.   Pulmonary:      Effort: Pulmonary effort is normal. No respiratory distress.   Abdominal:      Palpations: Abdomen is soft.   Musculoskeletal:         General: Normal range of motion.      Cervical back: Normal range of motion.   Skin:     General: Skin is warm and dry.   Neurological:      Mental Status: He is alert and oriented to person, place, and time.      Cranial Nerves: Cranial nerves 2-12 are intact.      Coordination: Finger-Nose-Finger Test normal.      Gait: Gait is intact.      Deep Tendon Reflexes:      Reflex Scores:       Bicep reflexes are 2+ on the right side and 2+ on the left side.       Brachioradialis reflexes are 2+ on the right side and 2+ on the left side.       Patellar reflexes are 2+ on the right side and 2+ on the left side.  Psychiatric:         Mood and Affect: Mood normal.         Speech: Speech normal.         Behavior: Behavior normal.         Thought Content: Thought content normal.         Judgment: Judgment normal.       Neurologic Exam     Mental Status   Oriented to person, place, and time.   Oriented to person.   Oriented to place.   Oriented to time.   Follows 2 step commands.   Attention: normal. Concentration: normal.   Speech: speech is normal   Level of consciousness: alert  Knowledge: good.   Able to repeat. Normal comprehension.     Cranial Nerves   Cranial nerves II through XII intact.     CN III, IV, VI   Pupils are equal, round, and reactive to light.    Motor Exam   Muscle bulk: normal  Overall muscle tone: normal  Right arm " pronator drift: absent  Left arm pronator drift: absent    Strength   Strength 5/5 except as noted.   Did not assess proximal LUE 2/2 injury     Sensory Exam   Light touch normal.     Gait, Coordination, and Reflexes     Gait  Gait: normal    Coordination   Finger to nose coordination: normal    Reflexes   Right brachioradialis: 2+  Left brachioradialis: 2+  Right biceps: 2+  Left biceps: 2+  Right patellar: 2+  Left patellar: 2+  Right Dahl: absent  Left Dahl: absent  Right ankle clonus: absent  Left ankle clonus: absent      I personally reviewed the following image studies in PACS and associated radiology reports: CT head. My interpretation of the radiology images/reports is: Large left PF arachnoid cyst without evidence of cerebral edema or brain compression.    Administrative Statements   I have spent a total time of 45 minutes in caring for this patient on the day of the visit/encounter including Diagnostic results, Prognosis, Risks and benefits of tx options, Instructions for management, Patient and family education, Importance of tx compliance, Risk factor reductions, Impressions, Counseling / Coordination of care, Documenting in the medical record, Reviewing / ordering tests, medicine, procedures  , Obtaining or reviewing history  , and Communicating with other healthcare professionals .

## 2024-09-12 ENCOUNTER — OFFICE VISIT (OUTPATIENT)
Dept: OBGYN CLINIC | Facility: CLINIC | Age: 74
End: 2024-09-12
Payer: COMMERCIAL

## 2024-09-12 VITALS
HEART RATE: 57 BPM | WEIGHT: 179 LBS | BODY MASS INDEX: 26.51 KG/M2 | DIASTOLIC BLOOD PRESSURE: 69 MMHG | SYSTOLIC BLOOD PRESSURE: 122 MMHG | HEIGHT: 69 IN

## 2024-09-12 DIAGNOSIS — Z01.818 PREOP EXAMINATION: ICD-10-CM

## 2024-09-12 DIAGNOSIS — M24.812 INTERNAL DERANGEMENT OF LEFT SHOULDER: Primary | ICD-10-CM

## 2024-09-12 PROCEDURE — 99214 OFFICE O/P EST MOD 30 MIN: CPT | Performed by: ORTHOPAEDIC SURGERY

## 2024-09-15 NOTE — PROGRESS NOTES
Orthopedic Sports Medicine follow-up patient Visit     Assesment:   74 y.o. male with left shoulder dislocation and traumatic rotator cuff tear, nondisplaced Bankart fracture    Plan:    I reviewed the MRI and the CT scan with the patient.  Prior to this injury the patient's shoulder was functioning very well without any significant issues.  Now he has severe weakness and dysfunction related to his massive rotator cuff tear and glenoid fracture.  Based on the acute traumatic nature of the injury, his high level of baseline function, and his large degree of weakness at this time I recommended surgical intervention of the rotator cuff tear as well as nonoperative treatment of the glenoid fracture.  Going to allow the glenoid fracture to heal for 3-4 more weeks prior to proceeding to surgery.  He has been out of the sling with no displacement of the fracture.  He is going to remain out of the sling this time but I cautioned him not to use the arm for any lifting pushing or pulling and no weightbearing through this of extremity.  I am going to see him back shortly in order to finalize surgical plan and consent.  In the meantime the patient is going to see his cardiologist and primary care provider to complete workup of several other issues and ensure optimization prior to surgery.        Chief Complaint   Patient presents with    Left Shoulder - Follow-up       History of Present Illness:    The patient is a 74 y.o., returns for evaluation of his shoulder injury.  Has had an MRI and CT scan completed since last visit.  Continues have significant pain in the shoulder.  Denies any numbness or tingling.    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 (HCC)     16jan2009  last assessed     Fracture     right lower leg    Hypertension     Skin neoplasm     [id number] resolved    Wears glasses      Past Surgical History:   Procedure Laterality Date    ANGIOPLASTY      COLONOSCOPY      COLONOSCOPY N/A 9/10/2018    Procedure: COLONOSCOPY;  Surgeon: Prem Friedman MD;  Location: [facility];  Service: Gastroenterology    CORONARY ANGIOPLASTY WITH STENT PLACEMENT      rca stent    FRACTURE SURGERY Right     tibia,fibula fx repair    TONSILLECTOMY      age 6    US GUIDED THYROID BIOPSY  12/9/2019     Allergies   Allergen Reactions    Lisinopril Cough     Current Outpatient Medications on File Prior to Visit   Medication Sig Dispense Refill    amLODIPine (NORVASC) 10 mg tablet TAKE 1 TABLET BY MOUTH DAILY AT  BEDTIME 90 tablet 3    clopidogrel (PLAVIX) 75 mg tablet TAKE 1 TABLET BY MOUTH DAILY AT  BEDTIME 90 tablet 3    metoprolol tartrate (LOPRESSOR) 50 mg tablet TAKE 1 TABLET BY MOUTH TWICE  DAILY 180 tablet 3    nitroglycerin (Nitrostat) 0.4 mg SL tablet Place 1 tablet (0.4 mg total) under the tongue every 5 (five) minutes as needed for chest pain 25 tablet 0    rosuvastatin (CRESTOR) 20 MG tablet TAKE 1 TABLET BY MOUTH DAILY AT  BEDTIME 90 tablet 3    tadalafil (Cialis) 20 MG tablet Take 1 tablet (20 mg total) by mouth daily as needed for erectile dysfunction 30 tablet 5    triamcinolone (KENALOG) 0.1 % ointment APPLY TOPICALLY TWO TIMES A DAY TO HANDS (SHORT TERM USE) (GENERIC FOR KENALOG) 80 g 5     No current facility-administered medications on file prior to visit.     Social History     Socioeconomic History    Marital status: /Civil Union     Spouse name: Not on file    Number of children: Not on file    Years of education: Not on file    Highest education level: Not on file   Occupational History    Not on file   Tobacco Use    Smoking status: Former     Current packs/day: 0.00     Average packs/day: 1 pack/day for 20.0 years (20.0 ttl pk-yrs)     Types: Cigarettes     Start date:  "6/15/1966     Quit date: 6/15/1986     Years since quittin.2     Passive exposure: Past    Smokeless tobacco: Never   Vaping Use    Vaping status: Never Used   Substance and Sexual Activity    Alcohol use: Yes     Comment: occ    Drug use: No    Sexual activity: Not on file   Other Topics Concern    Not on file   Social History Narrative    Not on file     Social Determinants of Health     Financial Resource Strain: Low Risk  (2023)    Overall Financial Resource Strain (CARDIA)     Difficulty of Paying Living Expenses: Not hard at all   Food Insecurity: No Food Insecurity (2024)    Hunger Vital Sign     Worried About Running Out of Food in the Last Year: Never true     Ran Out of Food in the Last Year: Never true   Transportation Needs: No Transportation Needs (2024)    PRAPARE - Transportation     Lack of Transportation (Medical): No     Lack of Transportation (Non-Medical): No   Physical Activity: Not on file   Stress: Not on file   Social Connections: Not on file   Intimate Partner Violence: Not on file   Housing Stability: Low Risk  (2024)    Housing Stability Vital Sign     Unable to Pay for Housing in the Last Year: No     Number of Times Moved in the Last Year: 0     Homeless in the Last Year: No         I have reviewed the past medical, surgical, social and family history, medications and allergies as documented in the EMR.    Review of systems: ROS is negative other than that noted in the HPI.       Physical Exam:    Blood pressure 122/69, pulse 57, height 5' 8.5\" (1.74 m), weight 81.2 kg (179 lb).    General/Constitutional: NAD, well developed, well nourished  HENT: Normocephalic, atraumatic  CV: Intact distal pulses, regular rate  Resp: No respiratory distress or labored breathing  Abdomen: soft, nondistended, non tender   Lymphatic: No lymphadenopathy palpated  Neuro: Alert and Oriented x 3, no focal deficits  Psych: Normal mood, normal affect  Skin: Warm, dry, no rashes, no " erythema      Shoulder Exam:      Inspection: No ecchymosis, edema, or deformity. No visualized wounds or skin lesions   Palpation: diffuse tenderness  Active Motion:       FF: 80 actively, 160 passively         ER: active 60        IR: L1  Strength: 3/5 empty can, 4/5 ER,  4/5 IR   Sensory - SILT in the Radial / Ulnar / Median / Axillary nerve distributions  Motor - AIN / PIN / Radial / Ulnar / Median / Axillary motor nerves in tact  Palpable Radial pulse  Cap refill <2secs in all digits        + Apprehension             Imaging    I reviewed and interpreted X-rays of the left shoulder reviewed and interpreted showing no definite acute fractures.  There is densities adjacent to the glenoid that appear chronic in nature less likely acute fracture.    MRI reviewed and interpreted showing massive rotator cuff tear including the subscapularis, supraspinatus, infraspinatus.  There is a biceps instability.  Poorly defined Bankart fracture    CT scan of the shoulder reviewed and interpreted showing a nondisplaced Bankart fracture with well-maintained glenoid vault

## 2024-09-17 ENCOUNTER — APPOINTMENT (OUTPATIENT)
Dept: LAB | Facility: HOSPITAL | Age: 74
End: 2024-09-17
Payer: COMMERCIAL

## 2024-09-17 ENCOUNTER — APPOINTMENT (OUTPATIENT)
Dept: LAB | Facility: HOSPITAL | Age: 74
End: 2024-09-17
Attending: ORTHOPAEDIC SURGERY
Payer: COMMERCIAL

## 2024-09-17 DIAGNOSIS — M24.812 INTERNAL DERANGEMENT OF LEFT SHOULDER: ICD-10-CM

## 2024-09-19 LAB
ATRIAL RATE: 54 BPM
P AXIS: 34 DEGREES
PR INTERVAL: 146 MS
QRS AXIS: 29 DEGREES
QRSD INTERVAL: 110 MS
QT INTERVAL: 430 MS
QTC INTERVAL: 407 MS
T WAVE AXIS: 34 DEGREES
VENTRICULAR RATE: 54 BPM

## 2024-09-19 PROCEDURE — 93010 ELECTROCARDIOGRAM REPORT: CPT | Performed by: INTERNAL MEDICINE

## 2024-09-23 ENCOUNTER — RA CDI HCC (OUTPATIENT)
Dept: OTHER | Facility: HOSPITAL | Age: 74
End: 2024-09-23

## 2024-09-23 DIAGNOSIS — I25.10 CORONARY ARTERY DISEASE INVOLVING NATIVE CORONARY ARTERY OF NATIVE HEART WITHOUT ANGINA PECTORIS: ICD-10-CM

## 2024-09-24 ENCOUNTER — HOSPITAL ENCOUNTER (OUTPATIENT)
Dept: RADIOLOGY | Facility: HOSPITAL | Age: 74
Discharge: HOME | End: 2024-09-24
Payer: COMMERCIAL

## 2024-09-24 ENCOUNTER — HOSPITAL ENCOUNTER (OUTPATIENT)
Dept: NON INVASIVE DIAGNOSTICS | Facility: HOSPITAL | Age: 74
Discharge: HOME | End: 2024-09-24
Payer: COMMERCIAL

## 2024-09-24 DIAGNOSIS — I77.89 OTHER SPECIFIED DISORDERS OF ARTERIES AND ARTERIOLES (HCC): ICD-10-CM

## 2024-09-24 DIAGNOSIS — R93.89 ABNORMAL CT SCAN, NECK: ICD-10-CM

## 2024-09-24 DIAGNOSIS — I65.21 CAROTID STENOSIS, RIGHT: Primary | ICD-10-CM

## 2024-09-24 DIAGNOSIS — R93.5 ABNORMAL CT OF THE ABDOMEN: ICD-10-CM

## 2024-09-24 PROCEDURE — 93880 EXTRACRANIAL BILAT STUDY: CPT

## 2024-09-24 PROCEDURE — 74174 CTA ABD&PLVS W/CONTRAST: CPT

## 2024-09-24 PROCEDURE — 93880 EXTRACRANIAL BILAT STUDY: CPT | Performed by: SURGERY

## 2024-09-24 RX ORDER — CLOPIDOGREL BISULFATE 75 MG/1
TABLET ORAL
Qty: 90 TABLET | Refills: 0 | Status: SHIPPED | OUTPATIENT
Start: 2024-09-24

## 2024-09-24 RX ADMIN — IOHEXOL 100 ML: 350 INJECTION, SOLUTION INTRAVENOUS at 15:23

## 2024-09-25 ENCOUNTER — TELEPHONE (OUTPATIENT)
Age: 74
End: 2024-09-25

## 2024-09-25 NOTE — TELEPHONE ENCOUNTER
"Hello,    The following message was sent via e-mail to the leadership team:     Please advise if you can help facilitate the following overbook request:    Patient Name: Santiago Rogers    Patient MRN: [id number]    Call back #: [phone]    Insurance: [facility]    Department:Vascular    Speciality: General Cardiology    Reason for overbook request: CARDIAC CLEARANCE PRIOR TO PROCEDURE (14-30 DAYS PRIOR TO PROCEDURE)    Comments (Write \"N/a\" if no comments): Patient states he is having surgery 10/5 on shoulder and needs clearances.    Requested doctor and location: any surgeon preferably closer to patient    Date of current appointment: 10/25/24      Thank you.      "

## 2024-09-27 NOTE — TELEPHONE ENCOUNTER
Caller: Santiago Rogers    Doctor: Melinda    Reason for call: Move 10/8 appointment back to 10/1, if possible. Patient is having surgery on 10/9 and needs clearance before then.    Call back#: [phone]

## 2024-09-28 ENCOUNTER — TELEPHONE (OUTPATIENT)
Dept: FAMILY MEDICINE CLINIC | Facility: CLINIC | Age: 74
End: 2024-09-28

## 2024-09-29 NOTE — TELEPHONE ENCOUNTER
Sw pt about upcoming preop appt for right RTC surgery  Advised needs carotids done first and await vasc surgeon opinion  Pt agreeable  He will cancel preop appt  
01-Jan-2019

## 2024-09-30 PROBLEM — Z00.00 MEDICARE ANNUAL WELLNESS VISIT, SUBSEQUENT: Status: RESOLVED | Noted: 2024-08-31 | Resolved: 2024-09-30

## 2024-10-06 NOTE — PROGRESS NOTES
"Vascular Surgery New Patient Visit  Date: 10/08/24      Assessment:  Santiago Rogers is a 74 y.o. male with asymptomatic R ICA stenosis. He has no prior history of stroke or stroke like symptoms. We discussed the indications for carotid intervention in the setting of carotid stenosis. Currently there is no evidence to indicate benefit in prophylactic carotid intervention for asymptomatic stenosis prior to non-cardiac surgery. Will plan to begin surveillance for his carotid stenosis.       Plan:  -Recommend starting ASA 81  -His ASA and plavix may be held perioperatively if he is to undergo orthopedic surgery, but should be resumed as soon as possible once considered safe from his orthopedic surgeon's perspective.   -RTC 6 months w/ carotid duplex for surveillance    Diagnoses and all orders for this visit:    Carotid stenosis, right  -     VAS carotid complete study; Future  -     aspirin (ECOTRIN LOW STRENGTH) 81 mg EC tablet; Take 1 tablet (81 mg total) by mouth daily  -     Ambulatory referral to Vascular Surgery           Subjective:     HPI:  Santiago Rogers is a 74 y.o. male with PMH of HTN and MI.  He was referred to clinic to discuss carotid duplex findings in the setting of possible upcoming orthopedic surgery.  He underwent noncontrasted CT imaging of his C-spine after a fall and was incidentally noted to have calcification of his carotid arteries.  Duplex was obtained, which demonstrated >70% stenosis of his R ICA (256/66, 3.47), and <50% stenosis of the L ICA.  Denies history of strokelike symptoms/TIA/amaurosis.     He is on Crestor and Plavix.      Objective:    ROS:  All systems were reviewed and are negative except those mentioned in HPI and below.      Vitals: /60 (BP Location: Right arm, Patient Position: Sitting, Cuff Size: Standard)   Pulse 63   Ht 5' 8.5\" (1.74 m)   Wt 80.9 kg (178 lb 4.8 oz)   SpO2 97%   BMI 26.72 kg/m²      General: male appears stated age, no apparent distress, alert and " oriented   HEENT: normocephalic, atraumatic   Cardiovascular: hemodynamically stable   Chest/Lungs: no increased work of breathing, chest rise equal bilaterally   Abdomen: Soft, ND, NT, no pulsatile masses   Extremities: Bilateral radial pulses   Skin: warm and dry   Neuro: no gross deficits      Medications:  Current Outpatient Medications   Medication Sig Dispense Refill    amLODIPine (NORVASC) 10 mg tablet TAKE 1 TABLET BY MOUTH DAILY AT  BEDTIME 90 tablet 3    aspirin (ECOTRIN LOW STRENGTH) 81 mg EC tablet Take 1 tablet (81 mg total) by mouth daily 30 tablet 0    clopidogrel (PLAVIX) 75 mg tablet TAKE 1 TABLET BY MOUTH DAILY AT  BEDTIME 90 tablet 0    metoprolol tartrate (LOPRESSOR) 50 mg tablet TAKE 1 TABLET BY MOUTH TWICE  DAILY 180 tablet 3    nitroglycerin (Nitrostat) 0.4 mg SL tablet Place 1 tablet (0.4 mg total) under the tongue every 5 (five) minutes as needed for chest pain 25 tablet 0    rosuvastatin (CRESTOR) 20 MG tablet TAKE 1 TABLET BY MOUTH DAILY AT  BEDTIME 90 tablet 3    tadalafil (Cialis) 20 MG tablet Take 1 tablet (20 mg total) by mouth daily as needed for erectile dysfunction 30 tablet 5    triamcinolone (KENALOG) 0.1 % ointment APPLY TOPICALLY TWO TIMES A DAY TO HANDS (SHORT TERM USE) (GENERIC FOR KENALOG) 80 g 5     No current facility-administered medications for this visit.       Allergies:  Allergies   Allergen Reactions    Lisinopril Cough        PMH:  Past Medical History:   Diagnosis Date    Chest pain     25nov2008  last assessed    Congenital anomaly of coronary artery     16Jan2009  last assessed    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 (HCC)     16jan2009  last assessed    Fracture     right lower leg    Hypertension     Skin neoplasm     [id number] resolved    Wears glasses         PSH:  Past Surgical History:   Procedure Laterality Date    ANGIOPLASTY      COLONOSCOPY      COLONOSCOPY N/A 9/10/2018    Procedure: COLONOSCOPY;  Surgeon: Prem Friedman MD;  Location:  [facility];  Service: Gastroenterology    CORONARY ANGIOPLASTY WITH STENT PLACEMENT      rca stent    FRACTURE SURGERY Right     tibia,fibula fx repair    TONSILLECTOMY      age 6    US GUIDED THYROID BIOPSY  2019        FHx:  Family History   Problem Relation Age of Onset    Coronary artery disease Father     Heart disease Father     Hypertension Father     Macular degeneration Father     Diabetes Sister     Heart disease Brother         CABG age 46,        SHx:  Social History     Socioeconomic History    Marital status: /Civil Union     Spouse name: Not on file    Number of children: Not on file    Years of education: Not on file    Highest education level: Not on file   Occupational History    Not on file   Tobacco Use    Smoking status: Former     Current packs/day: 0.00     Average packs/day: 1 pack/day for 20.0 years (20.0 ttl pk-yrs)     Types: Cigarettes     Start date: 6/15/1966     Quit date: 6/15/1986     Years since quittin.3     Passive exposure: Past    Smokeless tobacco: Never   Vaping Use    Vaping status: Never Used   Substance and Sexual Activity    Alcohol use: Yes     Comment: occ    Drug use: No    Sexual activity: Not on file   Other Topics Concern    Not on file   Social History Narrative    Not on file     Social Determinants of Health     Financial Resource Strain: Low Risk  (2023)    Overall Financial Resource Strain (CARDIA)     Difficulty of Paying Living Expenses: Not hard at all   Food Insecurity: No Food Insecurity (2024)    Hunger Vital Sign     Worried About Running Out of Food in the Last Year: Never true     Ran Out of Food in the Last Year: Never true   Transportation Needs: No Transportation Needs (2024)    PRAPARE - Transportation     Lack of Transportation (Medical): No     Lack of Transportation (Non-Medical): No   Physical Activity: Not on file   Stress: Not on file   Social Connections: Not on file   Intimate Partner Violence: Not on file    Housing Stability: Low Risk  (8/31/2024)    Housing Stability Vital Sign     Unable to Pay for Housing in the Last Year: No     Number of Times Moved in the Last Year: 0     Homeless in the Last Year: No

## 2024-10-07 NOTE — PROGRESS NOTES
Assessment/Plan:      There are no diagnoses linked to this encounter.      Subjective:     Patient ID: Santiago Rogers is a 74 y.o. male.    Pt is new to our practice and was REF by Dr. Shannon. Pt had CTA and CV on 09/24/2024.     HPI    Review of Systems   Eyes:  Negative for visual disturbance.   Neurological:  Negative for facial asymmetry, speech difficulty, weakness and headaches.   Psychiatric/Behavioral:  Negative for confusion.        Objective:     Physical Exam

## 2024-10-08 ENCOUNTER — CONSULT (OUTPATIENT)
Dept: VASCULAR SURGERY | Facility: CLINIC | Age: 74
End: 2024-10-08
Payer: COMMERCIAL

## 2024-10-08 VITALS
HEART RATE: 63 BPM | WEIGHT: 178.3 LBS | HEIGHT: 69 IN | DIASTOLIC BLOOD PRESSURE: 60 MMHG | OXYGEN SATURATION: 97 % | BODY MASS INDEX: 26.41 KG/M2 | SYSTOLIC BLOOD PRESSURE: 136 MMHG

## 2024-10-08 DIAGNOSIS — I65.21 CAROTID STENOSIS, RIGHT: ICD-10-CM

## 2024-10-08 PROCEDURE — 99204 OFFICE O/P NEW MOD 45 MIN: CPT | Performed by: STUDENT IN AN ORGANIZED HEALTH CARE EDUCATION/TRAINING PROGRAM

## 2024-10-10 ENCOUNTER — APPOINTMENT (OUTPATIENT)
Dept: RADIOLOGY | Facility: CLINIC | Age: 74
End: 2024-10-10
Payer: COMMERCIAL

## 2024-10-10 ENCOUNTER — PREP FOR PROCEDURE (OUTPATIENT)
Dept: OBGYN CLINIC | Facility: CLINIC | Age: 74
End: 2024-10-10

## 2024-10-10 ENCOUNTER — OFFICE VISIT (OUTPATIENT)
Dept: OBGYN CLINIC | Facility: CLINIC | Age: 74
End: 2024-10-10
Payer: COMMERCIAL

## 2024-10-10 ENCOUNTER — TELEPHONE (OUTPATIENT)
Age: 74
End: 2024-10-10

## 2024-10-10 VITALS
HEART RATE: 56 BPM | BODY MASS INDEX: 26.36 KG/M2 | SYSTOLIC BLOOD PRESSURE: 159 MMHG | DIASTOLIC BLOOD PRESSURE: 77 MMHG | HEIGHT: 69 IN | WEIGHT: 178 LBS

## 2024-10-10 DIAGNOSIS — S42.142S GLENOID FRACTURE OF SHOULDER, LEFT, SEQUELA: ICD-10-CM

## 2024-10-10 DIAGNOSIS — M24.812 INTERNAL DERANGEMENT OF LEFT SHOULDER: Primary | ICD-10-CM

## 2024-10-10 DIAGNOSIS — S42.152S GLENOID FRACTURE OF SHOULDER, LEFT, SEQUELA: ICD-10-CM

## 2024-10-10 DIAGNOSIS — M24.812 INTERNAL DERANGEMENT OF LEFT SHOULDER: ICD-10-CM

## 2024-10-10 DIAGNOSIS — S46.012D TRAUMATIC COMPLETE TEAR OF LEFT ROTATOR CUFF, SUBSEQUENT ENCOUNTER: ICD-10-CM

## 2024-10-10 PROCEDURE — 73030 X-RAY EXAM OF SHOULDER: CPT

## 2024-10-10 PROCEDURE — 99214 OFFICE O/P EST MOD 30 MIN: CPT | Performed by: ORTHOPAEDIC SURGERY

## 2024-10-10 RX ORDER — CHLORHEXIDINE GLUCONATE ORAL RINSE 1.2 MG/ML
15 SOLUTION DENTAL ONCE
OUTPATIENT
Start: 2024-10-10 | End: 2024-10-10

## 2024-10-10 RX ORDER — CEFAZOLIN SODIUM 2 G/50ML
2000 SOLUTION INTRAVENOUS ONCE
OUTPATIENT
Start: 2024-10-10 | End: 2024-10-10

## 2024-10-10 NOTE — PROGRESS NOTES
Assessment/Plan:  1. Internal derangement of left shoulder  XR shoulder 2+ vw left      2. Glenoid fracture of shoulder, left, sequela        3. Traumatic complete tear of left rotator cuff, subsequent encounter          Scribe Attestation      I,:  Guilherme Cortez MA am acting as a scribe while in the presence of the attending physician.:       I,:  Clint Suarez MD personally performed the services described in this documentation    as scribed in my presence.:               Santiago has a massive rotator cuff tear of 3 tendons.  After careful review of the MRI the musculature appears of good quality.  I recommend a rotator cuff repair with biceps tendon transposition to augment the repair versus a total shoulder replacement.  We did have a lengthy discussion today in regards to the details of the procedure.  In addition we spoke of the recovery.  He will be in a sling for 6 weeks following his surgery and will require an extensive amount of physical therapy.  Typical recovery will take a full 6 months sometimes up to a year.  Due to the size of his tear there is a higher risk of failure of the surgery.  Should a failure occur he would likely require shoulder replacement in the form of a reverse total shoulder arthroplasty.  There are other risks of the surgery. Risk of the surgery are inclusive of but not limited to bleeding, infection, nerve injury, blood clot, worsening of symptoms, not achieving the anticipated results, persistent stiffness, weakness and the need for additional surgery. The patient verbally stated he understood those risks and would like to proceed with the surgery.  Santiago is starting become significantly stiff secondary to his trauma.  I demonstrated gentle range of motion exercises that he can perform prior to surgery.  During surgery I may perform a manipulation under anesthesia with surgical release of adhesions that has formed.  Due to his stiffness he will begin physical therapy  approximately 1 to 2 weeks postoperatively.  All of his questions were answered to his satisfaction.    Santiago was fitted for a postop sling and instructed on its care and use.  He will bring this to the day of surgery.  The next time I see him will be in surgery.      Subjective:   Santiago Rogers is a 74 y.o. male who presents to discuss surgery for his left shoulder.  Santiago has a known history of a massive rotator cuff tear involving the supraspinatus, infraspinatus and subscapularis tendons.  There is also bony Bankart fracture with no displacement.  Santiago states his left shoulder pain is improving on a daily basis.  He complains of a general moderate ache that is fairly persistent.  He denies radicular symptoms such as distal paresthesias.  He has modified activities to prevent shoulder pain.      Review of Systems   Constitutional:  Negative for chills, fever and unexpected weight change.   HENT:  Negative for hearing loss, nosebleeds and sore throat.    Eyes:  Negative for pain, redness and visual disturbance.   Respiratory:  Negative for cough, shortness of breath and wheezing.    Cardiovascular:  Negative for chest pain, palpitations and leg swelling.   Gastrointestinal:  Negative for abdominal pain, nausea and vomiting.   Endocrine: Negative for polyphagia and polyuria.   Genitourinary:  Negative for dysuria and hematuria.   Musculoskeletal:         See HPI   Skin:  Negative for rash and wound.   Neurological:  Negative for dizziness, numbness and headaches.   Psychiatric/Behavioral:  Negative for decreased concentration and suicidal ideas. The patient is not nervous/anxious.          Past Medical History:   Diagnosis Date    Chest pain     25nov2008  last assessed    Congenital anomaly of coronary artery     16Jan2009  last assessed    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 (HCC)     16jan2009  last assessed    Fracture     right lower leg    Hypertension     Skin neoplasm     [id number] resolved     Wears glasses        Past Surgical History:   Procedure Laterality Date    ANGIOPLASTY      COLONOSCOPY      COLONOSCOPY N/A 9/10/2018    Procedure: COLONOSCOPY;  Surgeon: Prem Friedman MD;  Location: [facility];  Service: Gastroenterology    CORONARY ANGIOPLASTY WITH STENT PLACEMENT      rca stent    FRACTURE SURGERY Right     tibia,fibula fx repair    TONSILLECTOMY      age 6    US GUIDED THYROID BIOPSY  2019       Family History   Problem Relation Age of Onset    Coronary artery disease Father     Heart disease Father     Hypertension Father     Macular degeneration Father     Diabetes Sister     Heart disease Brother         CABG age 46,       Social History     Occupational History    Not on file   Tobacco Use    Smoking status: Former     Current packs/day: 0.00     Average packs/day: 1 pack/day for 20.0 years (20.0 ttl pk-yrs)     Types: Cigarettes     Start date: 6/15/1966     Quit date: 6/15/1986     Years since quittin.3     Passive exposure: Past    Smokeless tobacco: Never   Vaping Use    Vaping status: Never Used   Substance and Sexual Activity    Alcohol use: Yes     Comment: occ    Drug use: No    Sexual activity: Not on file         Current Outpatient Medications:     amLODIPine (NORVASC) 10 mg tablet, TAKE 1 TABLET BY MOUTH DAILY AT  BEDTIME, Disp: 90 tablet, Rfl: 3    aspirin (ECOTRIN LOW STRENGTH) 81 mg EC tablet, Take 1 tablet (81 mg total) by mouth daily, Disp: 30 tablet, Rfl: 0    clopidogrel (PLAVIX) 75 mg tablet, TAKE 1 TABLET BY MOUTH DAILY AT  BEDTIME, Disp: 90 tablet, Rfl: 0    metoprolol tartrate (LOPRESSOR) 50 mg tablet, TAKE 1 TABLET BY MOUTH TWICE  DAILY, Disp: 180 tablet, Rfl: 3    nitroglycerin (Nitrostat) 0.4 mg SL tablet, Place 1 tablet (0.4 mg total) under the tongue every 5 (five) minutes as needed for chest pain, Disp: 25 tablet, Rfl: 0    rosuvastatin (CRESTOR) 20 MG tablet, TAKE 1 TABLET BY MOUTH DAILY AT  BEDTIME, Disp: 90 tablet, Rfl: 3    tadalafil (Cialis)  20 MG tablet, Take 1 tablet (20 mg total) by mouth daily as needed for erectile dysfunction, Disp: 30 tablet, Rfl: 5    triamcinolone (KENALOG) 0.1 % ointment, APPLY TOPICALLY TWO TIMES A DAY TO HANDS (SHORT TERM USE) (GENERIC FOR KENALOG), Disp: 80 g, Rfl: 5    Allergies   Allergen Reactions    Lisinopril Cough       Objective:  Vitals:    10/10/24 0952   BP: 159/77   Pulse: 56       Left Shoulder Exam     Range of Motion   Active abduction:  abnormal   External rotation:  40   Forward flexion:  abnormal   Left shoulder internal rotation 0 degrees: Left buttock.     Muscle Strength   Abduction: 1/5   Internal rotation: 3/5   External rotation: 2/5   Supraspinatus: 0/5   Subscapularis: 3/5     Tests   Apprehension: positive    Other   Sensation: normal  Pulse: present (2+ radial)             Physical Exam  Vitals reviewed.   Constitutional:       Appearance: He is well-developed.   HENT:      Head: Normocephalic and atraumatic.   Eyes:      General:         Right eye: No discharge.         Left eye: No discharge.      Conjunctiva/sclera: Conjunctivae normal.   Cardiovascular:      Rate and Rhythm: Regular rhythm.   Pulmonary:      Effort: Pulmonary effort is normal. No respiratory distress.   Musculoskeletal:      Cervical back: Normal range of motion and neck supple.      Comments: As noted in the HPI.   Skin:     General: Skin is warm and dry.   Neurological:      Mental Status: He is alert and oriented to person, place, and time.   Psychiatric:         Behavior: Behavior normal.         I have personally reviewed pertinent films in PACS and my interpretation is as follows:  New x-rays of the left shoulder were obtained today.  There is evidence of a nondisplaced glenoid fracture and a Hill-Sachs lesion at the superior posterior lateral humeral head.      This document was created using speech voice recognition software.   Grammatical errors, random word insertions, pronoun errors, and incomplete sentences are an  occasional consequence of this system due to software limitations, ambient noise, and hardware issues.   Any formal questions or concerns about content, text, or information contained within the body of this dictation should be directly addressed to the provider for clarification.

## 2024-10-10 NOTE — TELEPHONE ENCOUNTER
Hello,    The following message was sent via e-mail to the leadership team:     Please advise if you can help facilitate the following overbook request:    Patient Name: Santiago Rogers    Patient MRN: [id number]     Call back #: Call patient @ [phone]    Insurance: [facility]    Department:Cardiology    Specialty: General Cardiology    Reason for overbook request: CARDIAC CLEARANCE PRIOR TO PROCEDURE (14-30 DAYS PRIOR TO PROCEDURE)    Comments: [facility] called to schedule CC appt for patient who is currently scheduled to have left shoulder rotator cuff repair on 10/30/24, but Dr. Suarez would like to do this sooner if possible.  Patient is established with Dr. Horta and was due for an 8 month followup in September Requested doctor and location: Dr. Horta/[facility]    Date of current appointment: Nothing available to schedule prior to surgery date      Thank you.

## 2024-10-22 ENCOUNTER — CONSULT (OUTPATIENT)
Dept: FAMILY MEDICINE CLINIC | Facility: CLINIC | Age: 74
End: 2024-10-22
Payer: COMMERCIAL

## 2024-10-22 VITALS
SYSTOLIC BLOOD PRESSURE: 130 MMHG | OXYGEN SATURATION: 97 % | DIASTOLIC BLOOD PRESSURE: 80 MMHG | HEART RATE: 61 BPM | WEIGHT: 178 LBS | HEIGHT: 69 IN | TEMPERATURE: 98 F | BODY MASS INDEX: 26.36 KG/M2 | RESPIRATION RATE: 16 BRPM

## 2024-10-22 DIAGNOSIS — I25.10 CORONARY ARTERY DISEASE INVOLVING NATIVE CORONARY ARTERY OF NATIVE HEART WITHOUT ANGINA PECTORIS: ICD-10-CM

## 2024-10-22 DIAGNOSIS — R93.5 ABNORMAL CT OF THE ABDOMEN: ICD-10-CM

## 2024-10-22 DIAGNOSIS — S46.012D TRAUMATIC COMPLETE TEAR OF LEFT ROTATOR CUFF, SUBSEQUENT ENCOUNTER: Primary | ICD-10-CM

## 2024-10-22 DIAGNOSIS — Z95.5 STENTED CORONARY ARTERY: ICD-10-CM

## 2024-10-22 DIAGNOSIS — I10 BENIGN ESSENTIAL HYPERTENSION: ICD-10-CM

## 2024-10-22 PROBLEM — I65.21 STENOSIS OF RIGHT CAROTID ARTERY GREATER THAN 50%: Status: ACTIVE | Noted: 2024-10-22

## 2024-10-22 PROCEDURE — G2211 COMPLEX E/M VISIT ADD ON: HCPCS | Performed by: FAMILY MEDICINE

## 2024-10-22 PROCEDURE — 99214 OFFICE O/P EST MOD 30 MIN: CPT | Performed by: FAMILY MEDICINE

## 2024-10-22 NOTE — PROGRESS NOTES
Assessment/Plan:    No problem-specific Assessment & Plan notes found for this encounter.         Diagnoses and all orders for this visit:    Traumatic complete tear of left rotator cuff, subsequent encounter    Abnormal CT of the abdomen  -     US abdomen complete; Future    Benign essential hypertension    Coronary artery disease involving native coronary artery of native heart without angina pectoris    Stented coronary artery        Cleared for surgery    Waiting cardiac clearance due to CAD/stent  Vasc cleared for carotid stenosis of right ICA 70-99%    US abdomen ordered for liver and left kidney findings on last CT as discussed with pt    PAT ok    Return if symptoms worsen or fail to improve.    Subjective:      Patient ID: Santiago Rogers is a 74 y.o. male.    Chief Complaint   Patient presents with    Pre-op Exam     Cma         HPI    Planned procedure:  rotator cuff repair, left  Surgeon:  Dr Suarez  Date:  10/30/24  Anesthesia type:  unstated    Prior major surgeries:  cardiac stent, tonsillectomy  Problems with anesthesia in past:  n    Can walk 4 blocks or 2 flights of stairs:  y  History of excessive bleeding:  n  History of excessive clotting:  n  Personal history of DVT or Pe:  n    Taking NSAIDS:  n  Takings vitamins D or E or A or fish oil supplements:  n    Usual am medications:  toprol    Carotid condition cleared by vascular surgeon    The following portions of the patient's history were reviewed and updated as appropriate: allergies, current medications, past family history, past medical history, past social history, past surgical history and problem list.    Review of Systems   Constitutional:  Negative for fever.   Respiratory:  Negative for shortness of breath.    Neurological:  Negative for dizziness.         Current Outpatient Medications   Medication Sig Dispense Refill    amLODIPine (NORVASC) 10 mg tablet TAKE 1 TABLET BY MOUTH DAILY AT  BEDTIME 90 tablet 3    aspirin (ECOTRIN LOW  "STRENGTH) 81 mg EC tablet Take 1 tablet (81 mg total) by mouth daily 30 tablet 0    clopidogrel (PLAVIX) 75 mg tablet TAKE 1 TABLET BY MOUTH DAILY AT  BEDTIME 90 tablet 0    metoprolol tartrate (LOPRESSOR) 50 mg tablet TAKE 1 TABLET BY MOUTH TWICE  DAILY 180 tablet 3    nitroglycerin (Nitrostat) 0.4 mg SL tablet Place 1 tablet (0.4 mg total) under the tongue every 5 (five) minutes as needed for chest pain 25 tablet 0    rosuvastatin (CRESTOR) 20 MG tablet TAKE 1 TABLET BY MOUTH DAILY AT  BEDTIME 90 tablet 3    tadalafil (Cialis) 20 MG tablet Take 1 tablet (20 mg total) by mouth daily as needed for erectile dysfunction 30 tablet 5    triamcinolone (KENALOG) 0.1 % ointment APPLY TOPICALLY TWO TIMES A DAY TO HANDS (SHORT TERM USE) (GENERIC FOR KENALOG) 80 g 5     No current facility-administered medications for this visit.       Objective:    /80   Pulse 61   Temp 98 °F (36.7 °C) (Tympanic)   Resp 16   Ht 5' 8.5\" (1.74 m)   Wt 80.7 kg (178 lb)   SpO2 97%   BMI 26.67 kg/m²        Physical Exam  Vitals and nursing note reviewed.   Constitutional:       General: He is not in acute distress.     Appearance: He is well-developed. He is not ill-appearing.   HENT:      Head: Normocephalic.      Right Ear: Tympanic membrane normal.      Left Ear: Tympanic membrane normal.      Mouth/Throat:      Mouth: Mucous membranes are moist.   Eyes:      General: No scleral icterus.     Conjunctiva/sclera: Conjunctivae normal.   Cardiovascular:      Rate and Rhythm: Normal rate and regular rhythm.   Pulmonary:      Effort: Pulmonary effort is normal. No respiratory distress.      Breath sounds: No wheezing.   Abdominal:      Palpations: Abdomen is soft.   Musculoskeletal:         General: No deformity.      Cervical back: Neck supple.      Right lower leg: No edema.      Left lower leg: No edema.   Skin:     General: Skin is warm and dry.      Coloration: Skin is not pale.   Neurological:      Mental Status: He is alert.      " Motor: No weakness.      Gait: Gait normal.   Psychiatric:         Mood and Affect: Mood normal.         Behavior: Behavior normal.         Thought Content: Thought content normal.                Isaac Shannon,

## 2024-10-23 NOTE — PRE-PROCEDURE INSTRUCTIONS
Pre-Surgery Instructions:   Medication Instructions    amLODIPine (NORVASC) 10 mg tablet Take night before surgery    aspirin (ECOTRIN LOW STRENGTH) 81 mg EC tablet Stop taking 7 days prior to surgery.    clopidogrel (PLAVIX) 75 mg tablet Instructions provided by MD    metoprolol tartrate (LOPRESSOR) 50 mg tablet Take day of surgery.    nitroglycerin (Nitrostat) 0.4 mg SL tablet Hold day of surgery.    rosuvastatin (CRESTOR) 20 MG tablet Take night before surgery    tadalafil (Cialis) 20 MG tablet Take night before surgery    triamcinolone (KENALOG) 0.1 % ointment Stop taking 1 day prior to surgery.    LD plavix pending CC appt. Pt given guidelines awaiting cardiology recommendations. Medication instructions for day surgery reviewed. Please use only a sip of water to take your instructed medications. Avoid all over the counter vitamins, supplements and NSAIDS for one week prior to surgery per anesthesia guidelines. Tylenol is ok to take as needed.     You will receive a call one business day prior to surgery with an arrival time and hospital directions. If your surgery is scheduled on a Monday, the hospital will be calling you on the Friday prior to your surgery. If you have not heard from anyone by 8pm, please call the hospital supervisor through the hospital  at [phone]. ([place] [phone] or [place] [phone]).    Do not eat or drink anything after midnight the night before your surgery, including candy, mints, lifesavers, or chewing gum. Do not drink alcohol 24hrs before your surgery. Try not to smoke at least 24hrs before your surgery.       Follow the pre surgery showering instructions as listed in the “My Surgical Experience Booklet” or otherwise provided by your surgeon's office. Do not use a blade to shave the surgical area 1 week before surgery. It is okay to use a clean electric clippers up to 24 hours before surgery. Do not apply any lotions, creams, including makeup, cologne,  deodorant, or perfumes after showering on the day of your surgery. Do not use dry shampoo, hair spray, hair gel, or any type of hair products.     No contact lenses, eye make-up, or artificial eyelashes. Remove nail polish, including gel polish, and any artificial, gel, or acrylic nails if possible. Remove all jewelry including rings and body piercing jewelry.     Wear causal clothing that is easy to take on and off. Consider your type of surgery.    Keep any valuables, jewelry, piercings at home. Please bring any specially ordered equipment (sling, braces) if indicated.    Arrange for a responsible person to drive you to and from the hospital on the day of your surgery. Please confirm the visitor policy for the day of your procedure when you receive your phone call with an arrival time.     Call the surgeon's office with any new illnesses, exposures, or additional questions prior to surgery.    Please reference your “My Surgical Experience Booklet” for additional information to prepare for your upcoming surgery.

## 2024-10-24 NOTE — PROGRESS NOTES
Progress Note - Cardiology Office  [facility]    Santiago Rogers 74 y.o. male MRN: [id number]  : 1950  Encounter: [id number]      ASSESSMENT:   Perioperative cardiac risk assessment for orthopedic surgery/left shoulder rotator cuff repair, biceps tenodesis, possible open rotator cuff repair, possible allograft augmentation under general anesthesia with interscalene block on 10/30/2024    Traumatic complete tear of left shoulder rotator cuff  Left shoulder pain    CAD  S/p PCI of RCA in     Benign essential hypertension  BP is elevated today at 148/80.  3 days ago it was 130/80  Advised patient to continue current antihypertensive medications.  Minimize sodium intake    Dyslipidemia    Asymptomatic R ICA stenosis    Nuclear stress test on 9/10/2021  No perfusion defects, EF 57%    TTE, 2021:  EF 55-60%, mild MR, trace TR      RECOMMENDATIONS:  Patient has low to intermediate perioperative cardiac risk for above planned orthopedic surgery.  There is no cardiac contraindication to the surgery.  May hold aspirin and Plavix for 5 days preop and then resume once cleared by the orthopedic surgeon  Low-salt diet      Please call [phone] if any questions.    HPI :     Santiago Rogers is a 74 y.o. year old male who came for   Perioperative cardiac risk assessment prior to undergoing orthopedic surgery/left rotator cuff repair.  Patient has a traumatic rupture of left rotator cuff,.  He denies any cardiac symptoms.  His EKG is normal and his cardiac examination is benign    REVIEW OF SYSTEMS:  Review of Systems   Musculoskeletal:         Left shoulder pain and mobility limitation   All other systems reviewed and are negative.        Historical Information   Past Medical History:   Diagnosis Date    Chest pain     2008  last assessed    Congenital anomaly of coronary artery     2009  last assessed    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 (HCC)     2009  last  assessed    Fracture     right lower leg    Hyperlipidemia     Hypertension     Skin neoplasm     [id number] resolved    Wears glasses      Past Surgical History:   Procedure Laterality Date    ANGIOPLASTY      COLONOSCOPY      COLONOSCOPY N/A 9/10/2018    Procedure: COLONOSCOPY;  Surgeon: Prem Friedman MD;  Location: [facility];  Service: Gastroenterology    CORONARY ANGIOPLASTY WITH STENT PLACEMENT      rca stent    FRACTURE SURGERY Right     tibia,fibula fx repair    TONSILLECTOMY      age 6    US GUIDED THYROID BIOPSY  2019     Social History     Substance and Sexual Activity   Alcohol Use Yes    Comment: occ     Social History     Substance and Sexual Activity   Drug Use No     Social History     Tobacco Use   Smoking Status Former    Current packs/day: 0.00    Average packs/day: 1 pack/day for 20.0 years (20.0 ttl pk-yrs)    Types: Cigarettes    Start date: 6/15/1966    Quit date: 6/15/1986    Years since quittin.3    Passive exposure: Past   Smokeless Tobacco Never     Family History:   Family History   Problem Relation Age of Onset    Coronary artery disease Father     Heart disease Father     Hypertension Father     Macular degeneration Father     Diabetes Sister     Heart disease Brother         CABG age 46,       Meds/Allergies     Allergies   Allergen Reactions    Lisinopril Cough       Current Outpatient Medications:     amLODIPine (NORVASC) 10 mg tablet, TAKE 1 TABLET BY MOUTH DAILY AT  BEDTIME, Disp: 90 tablet, Rfl: 3    aspirin (ECOTRIN LOW STRENGTH) 81 mg EC tablet, Take 1 tablet (81 mg total) by mouth daily, Disp: 30 tablet, Rfl: 0    clopidogrel (PLAVIX) 75 mg tablet, TAKE 1 TABLET BY MOUTH DAILY AT  BEDTIME, Disp: 90 tablet, Rfl: 0    metoprolol tartrate (LOPRESSOR) 50 mg tablet, TAKE 1 TABLET BY MOUTH TWICE  DAILY, Disp: 180 tablet, Rfl: 3    nitroglycerin (Nitrostat) 0.4 mg SL tablet, Place 1 tablet (0.4 mg total) under the tongue every 5 (five) minutes as needed for chest pain,  "Disp: 25 tablet, Rfl: 0    rosuvastatin (CRESTOR) 20 MG tablet, TAKE 1 TABLET BY MOUTH DAILY AT  BEDTIME, Disp: 90 tablet, Rfl: 3    tadalafil (Cialis) 20 MG tablet, Take 1 tablet (20 mg total) by mouth daily as needed for erectile dysfunction, Disp: 30 tablet, Rfl: 5    triamcinolone (KENALOG) 0.1 % ointment, APPLY TOPICALLY TWO TIMES A DAY TO HANDS (SHORT TERM USE) (GENERIC FOR KENALOG), Disp: 80 g, Rfl: 5    Vitals: Blood pressure 148/80, pulse 56, height 5' 8.5\" (1.74 m), weight 80.3 kg (177 lb), SpO2 97%.    Body mass index is 26.52 kg/m².  Vitals:    10/25/24 0931   Weight: 80.3 kg (177 lb)     BP Readings from Last 3 Encounters:   10/25/24 148/80   10/22/24 130/80   10/10/24 159/77       Physical Exam:  Physical Exam    Neurologic:  Alert & oriented x 3, no new focal deficits, Not in any acute distress,  Constitutional:  Well developed, well nourished, non-toxic appearance   Eyes:  Pupil equal and reacting to light, conjunctiva normal,   HENT:  Atraumatic, oropharynx moist, Neck- normal range of motion, no tenderness,  Neck supple, No JVP, No LNP   Respiratory:  Bilateral air entry, mostly clear to auscultation  Cardiovascular: S1-S2 regular with a I/VI systolic murmur   GI:  Soft, nondistended, normal bowel sounds, nontender, no hepatosplenomegaly appreciated.  Musculoskeletal: Left shoulder tenderness with limitation of movement  Skin:  Well hydrated, no rash   Lymphatic:  No lymphadenopathy noted   Extremities:  No edema and distal pulses are present        Diagnostic Studies Review Cardio:      EKG: Sinus bradycardia, heart rate 56/min    Cardiac testing:   Results for orders placed during the hospital encounter of 06/18/21    Echo complete with contrast if indicated    Narrative  [address] [id number] [phone]    Transthoracic Echocardiogram  2D, M-mode, Doppler, and Color Doppler    Study date:  18-Jun-2021    Patient: MAXWELL CISNEROS  MR number: " [id number]  Account number: [id number]  : 1950  Age: 71 years  Gender: Male  Status: Outpatient  Location: Echo lab  Height: 69 in  Weight: 182.6 lb  BP: 140/ 70 mmHg    Indications: CAD    Diagnoses: I25.83 - Coronary atherosclerosis due to lipid rich plaque    Sonographer:  EMILY Rivero  Primary Physician:  Isaac Shannon DO  Referring Physician:  Dilma Horta MD  Group:  [facility]  Interpreting Physician:  Edward Carter MD    SUMMARY    LEFT VENTRICLE:  Normal left ventricular chamber size and wall thickness  Estimated left ventricular ejection fraction is 55-60%  No definite regional wall motion abnormality seen although it cannot be completely excluded on the basis of this study  Normal diastolic filling pattern    RIGHT VENTRICLE:  Normal right ventricular size and systolic function  TAPSE is 2.5 cm    MITRAL VALVE:  There was mild regurgitation.    TRICUSPID VALVE:  There was trace regurgitation.    HISTORY: PRIOR HISTORY: HTN, CAD, IHD, HLD, S/P Stent    PROCEDURE: The procedure was performed in the echo lab. This was a routine study. The transthoracic approach was used. The study included complete 2D imaging, M-mode, complete spectral Doppler, and color Doppler. The heart rate was 56 bpm,  at the start of the study. Image quality was adequate.    LEFT VENTRICLE: Normal left ventricular chamber size and wall thickness  Estimated left ventricular ejection fraction is 55-60%  No definite regional wall motion abnormality seen although it cannot be completely excluded on the basis of this study  Normal diastolic filling pattern    RIGHT VENTRICLE: Normal right ventricular size and systolic function  TAPSE is 2.5 cm    LEFT ATRIUM: Size was normal.    RIGHT ATRIUM: Size was normal.    MITRAL VALVE: Valve structure was normal. There was normal leaflet separation. DOPPLER: The transmitral velocity was within the normal range. There was no evidence for stenosis. There was  mild regurgitation.    AORTIC VALVE: The valve was trileaflet. Leaflets exhibited normal thickness and normal cuspal separation. DOPPLER: Transaortic velocity was within the normal range. There was no evidence for stenosis. There was no regurgitation.    TRICUSPID VALVE: The valve structure was normal. There was normal leaflet separation. DOPPLER: The transtricuspid velocity was within the normal range. There was no evidence for stenosis. There was trace regurgitation. The tricuspid jet  envelope definition was inadequate for estimation of RV systolic pressure. There are no indirect findings (abnormal RV volume or geometry, altered pulmonary flow velocity profile, or leftward septal displacement) which would suggest  moderate or severe pulmonary hypertension.    PERICARDIUM: There was no pericardial effusion. The pericardium was normal in appearance.    AORTA: The root exhibited normal size.    SYSTEM MEASUREMENT TABLES    2D  EF (Teich): 50.68 %  %FS: 25.84 %  Ao Diam: 2.9 cm  EDV(Teich): 110.69 ml  ESV(Teich): 54.59 ml  IVSd: 1 cm  LA Diam: 3.27 cm  LVEDV MOD A4C: 61.81 ml  LVEF MOD A4C: 53.11 %  LVESV MOD A4C: 28.98 ml  LVIDd: 4.86 cm  LVIDs: 3.6 cm  LVLd A4C: 6.64 cm  LVLs A4C: 5.89 cm  LVOT Diam: 1.93 cm  LVPWd: 1.04 cm  SV (Teich): 56.1 ml  SV MOD A4C: 32.83 ml    CW  TR Vmax: 4.33 m/s  TR maxP.13 mmHg    MM  TAPSE: 2.46 cm    PW  MV E/A Ratio: 0.88  E' Sept: 0.11 m/s  E/E' Sept: 5.44  MV A [place]: 0.67 m/s  MV Dec [place]: 2.38 m/s2  MV DecT: 247.4 ms  MV E [place]: 0.59 m/s  MV PHT: 71.75 ms  MVA By PHT: 3.07 cm2    [employer] Accredited Echocardiography Laboratory    Prepared and electronically signed by    Edward Carter MD  Signed 2021 14:15:48      Results for orders placed during the hospital encounter of 09/10/21    NM myocardial perfusion spect (stress and/or rest)    Narrative  [address] [id number] [phone]    Rest/Stress Gated  SPECT Myocardial Perfusion Imaging After Regadenoson    Patient: MAXWELL CISNEROS  MR number: [id number]  Account number: [id number]  : 1950  Age: 71 years  Gender: Male  Status: Outpatient  Location: Stress lab  Height: 69 in  Weight: 179 lb  BP: 152/ 84 mmHg    Allergies: LISINOPRIL    Diagnosis: R94.39 - Abnormal result of other cardiovascular function study    Primary Physician:  Isaac Shannon DO  RN:  HARLEY [place]  Referring Physician:  Dilma Horta MD  Group:  [facility]  Report Prepared By::  HARLEY [place]  Interpreting Physician:  Doug Casas MD    INDICATIONS: Evaluation of known coronary artery disease.    HISTORY: The patient is a 71 year old  male. Chest pain status: chest pain. Coronary artery disease risk factors: dyslipidemia, hypertension, and family history of premature coronary artery disease. Cardiovascular history:  coronary artery disease. Prior cardiovascular procedures: percutaneous transluminal coronary angioplasty. Medications: a beta blocker, a calcium channel blocker, clopidogrel, and a lipid lowering agent.    PHYSICAL EXAM: Baseline physical exam screening: normal.    REST ECG: Sinus bradycardia.    PROCEDURE: The study was performed in the the Stress lab. A regadenoson infusion pharmacologic stress test was performed. Gated SPECT myocardial perfusion imaging was performed after stress and at rest. Systolic blood pressure was 152  mmHg, at the start of the study. Diastolic blood pressure was 84 mmHg, at the start of the study. The heart rate was 54 bpm, at the start of the study. IV double checked.  Regadenoson protocol:  Time HR bpm SBP mmHg DBP mmHg Symptoms Rhythm/conduct  Baseline 10:47 54 152 84 none sinus zo  Immediate 11:00 79 165 75 none NSR  1 min 11:01 75 158 70 none --  2 min 11:02 72 147 55 none same as above  3 min 11:03 70 139 64 none --  4 min 11:04 67 138 65 none same as above  exercised for 9:01 mins, unable to continue due to  fatigue, SOB and leg pain. protocol changed to lexiscan. No medications or fluids given.    STRESS SUMMARY: Duration of pharmacologic stress was 3 min. Maximal heart rate during stress was 108 bpm. The heart rate response to stress was normal. There was resting hypertension with an appropriate blood pressure response to stress.  The rate-pressure product for the peak heart rate and blood pressure was 12031. There was no chest pain during stress. The stress test was terminated due to protocol completion. Pre oxygen saturation: 99 %. Peak oxygen saturation: 98 %.  The stress ECG was equivocal for ischemia. There were no stress arrhythmias or conduction abnormalities.    ISOTOPE ADMINISTRATION:  Resting isotope administration Stress isotope administration  Agent Tetrofosmin Tetrofosmin  Dose 10.5 mCi 32.6 mCi  Date 09/10/2021 09/10/2021    The radiopharmaceutical was injected at the peak effect of pharmacologic stress.    MYOCARDIAL PERFUSION IMAGING:  The image quality was good. Rotating projection images reveal prone imaging completed for attenuation correction. Left ventricular size was normal. The TID ratio was 1.    PERFUSION DEFECTS:  -  There were no perfusion defects.    GATED SPECT:  The calculated left ventricular ejection fraction was 57 %. There was no left ventricular regional abnormality.    SUMMARY:  -  Stress results: There was resting hypertension with an appropriate blood pressure response to stress. There was no chest pain during stress.  -  ECG conclusions: The stress ECG was equivocal for ischemia.  -  Perfusion imaging: There were no perfusion defects.  -  Gated SPECT: The calculated left ventricular ejection fraction was 57 %. There was no left ventricular regional abnormality.  -  Impressions and recommendations: Normal study after vasodilation and low level exercise.    IMPRESSIONS: Normal study after vasodilation and low level exercise. Myocardial perfusion imaging was normal at rest and with  "stress.    Prepared and signed by    Doug Casas MD  Signed 09/10/2021 14:47:11    Imaging:  Chest X-Ray:   No Chest XR results available for this patient.    CT-scan of the chest:     CTA abdomen pelvis w wo contrast    Result Date: 9/25/2024  Impression Mild bilateral common iliac origin narrowing. If patient exhibits signs of claudication, recommend vascular surgery consultation. Indeterminant right hepatic lobe low-density lesion and probable left renal simple cyst. Consider nonemergent abdominal ultrasound for further evaluation of these 2 findings. Workstation performed: [id number]     Lab Review   Lab Results   Component Value Date    WBC 7.71 09/17/2024    HGB 13.6 09/17/2024    HCT 41.9 09/17/2024    MCV 88 09/17/2024    RDW 13.6 09/17/2024     09/17/2024     BMP:  Lab Results   Component Value Date    SODIUM 140 09/17/2024    K 3.9 09/17/2024     09/17/2024    CO2 26 09/17/2024    BUN 12 09/17/2024    CREATININE 0.94 09/17/2024    GLUC 124 09/17/2024    CALCIUM 8.8 09/17/2024    EGFR 79 09/17/2024     LFT:  Lab Results   Component Value Date    AST 18 08/21/2024    ALT 18 08/21/2024    ALKPHOS 47 08/21/2024    TP 7.0 08/21/2024    ALB 4.5 08/21/2024      No components found for: \"TSH3\"  No results found for: \"[id number]\"  Lab Results   Component Value Date    HGBA1C 6.3 (H) 03/16/2023     Lipid Profile:   Lab Results   Component Value Date    CHOLESTEROL 166 03/16/2023    HDL 48 03/16/2023    LDLCALC 91 03/16/2023    TRIG 157 (H) 03/16/2023     Lab Results   Component Value Date    CHOLESTEROL 166 03/16/2023    CHOLESTEROL 159 03/11/2022     No results found for: \"CKTOTAL\", \"CKMB\", \"CKMBINDEX\", \"TROPONINI\"  No results found for: \"NTBNP\"   No results found for this or any previous visit (from the past 672 hour(s)).          Dr. Edward Carter MD, [facility]      \"This note has been constructed using a voice recognition system.Therefore there may be syntax, spelling, and/or grammatical errors. Please " "call if you have any questions. \"  "

## 2024-10-25 ENCOUNTER — OFFICE VISIT (OUTPATIENT)
Dept: CARDIOLOGY CLINIC | Facility: CLINIC | Age: 74
End: 2024-10-25
Payer: COMMERCIAL

## 2024-10-25 VITALS
HEIGHT: 69 IN | OXYGEN SATURATION: 97 % | BODY MASS INDEX: 26.22 KG/M2 | DIASTOLIC BLOOD PRESSURE: 80 MMHG | WEIGHT: 177 LBS | HEART RATE: 56 BPM | SYSTOLIC BLOOD PRESSURE: 148 MMHG

## 2024-10-25 DIAGNOSIS — I10 BENIGN ESSENTIAL HYPERTENSION: ICD-10-CM

## 2024-10-25 DIAGNOSIS — Z95.5 STENTED CORONARY ARTERY: ICD-10-CM

## 2024-10-25 DIAGNOSIS — I25.9 CHRONIC ISCHEMIC HEART DISEASE, UNSPECIFIED: Primary | ICD-10-CM

## 2024-10-25 DIAGNOSIS — I25.10 CORONARY ARTERY DISEASE INVOLVING NATIVE CORONARY ARTERY OF NATIVE HEART WITHOUT ANGINA PECTORIS: ICD-10-CM

## 2024-10-25 DIAGNOSIS — E78.5 HYPERLIPIDEMIA LDL GOAL <100: ICD-10-CM

## 2024-10-25 PROCEDURE — 99214 OFFICE O/P EST MOD 30 MIN: CPT | Performed by: INTERNAL MEDICINE

## 2024-10-25 PROCEDURE — 93000 ELECTROCARDIOGRAM COMPLETE: CPT | Performed by: INTERNAL MEDICINE

## 2024-10-29 ENCOUNTER — ANESTHESIA EVENT (OUTPATIENT)
Dept: PERIOP | Facility: HOSPITAL | Age: 74
End: 2024-10-29
Payer: COMMERCIAL

## 2024-10-30 ENCOUNTER — ANESTHESIA (OUTPATIENT)
Dept: PERIOP | Facility: HOSPITAL | Age: 74
End: 2024-10-30
Payer: COMMERCIAL

## 2024-10-30 ENCOUNTER — HOSPITAL ENCOUNTER (OUTPATIENT)
Facility: HOSPITAL | Age: 74
Setting detail: OUTPATIENT SURGERY
Discharge: HOME | End: 2024-10-30
Attending: ORTHOPAEDIC SURGERY | Admitting: ORTHOPAEDIC SURGERY
Payer: COMMERCIAL

## 2024-10-30 VITALS
RESPIRATION RATE: 16 BRPM | DIASTOLIC BLOOD PRESSURE: 59 MMHG | SYSTOLIC BLOOD PRESSURE: 108 MMHG | HEART RATE: 58 BPM | TEMPERATURE: 97.9 F | OXYGEN SATURATION: 98 %

## 2024-10-30 DIAGNOSIS — I10 BENIGN ESSENTIAL HYPERTENSION: ICD-10-CM

## 2024-10-30 DIAGNOSIS — Z98.890 S/P RIGHT ROTATOR CUFF REPAIR: ICD-10-CM

## 2024-10-30 DIAGNOSIS — Z47.89 AFTERCARE FOLLOWING SURGERY OF THE MUSCULOSKELETAL SYSTEM: ICD-10-CM

## 2024-10-30 DIAGNOSIS — Z98.890 S/P LEFT ROTATOR CUFF REPAIR: Primary | ICD-10-CM

## 2024-10-30 DIAGNOSIS — E78.5 DYSLIPIDEMIA: ICD-10-CM

## 2024-10-30 DIAGNOSIS — S46.012D TRAUMATIC COMPLETE TEAR OF LEFT ROTATOR CUFF, SUBSEQUENT ENCOUNTER: ICD-10-CM

## 2024-10-30 PROCEDURE — 29827 SHO ARTHRS SRG RT8TR CUF RPR: CPT

## 2024-10-30 PROCEDURE — C1713 ANCHOR/SCREW BN/BN,TIS/BN: HCPCS | Performed by: ORTHOPAEDIC SURGERY

## 2024-10-30 PROCEDURE — C9290 INJ, BUPIVACAINE LIPOSOME: HCPCS | Performed by: ANESTHESIOLOGY

## 2024-10-30 PROCEDURE — 29827 SHO ARTHRS SRG RT8TR CUF RPR: CPT | Performed by: ORTHOPAEDIC SURGERY

## 2024-10-30 PROCEDURE — 29823 SHO ARTHRS SRG XTNSV DBRDMT: CPT

## 2024-10-30 PROCEDURE — 29823 SHO ARTHRS SRG XTNSV DBRDMT: CPT | Performed by: ORTHOPAEDIC SURGERY

## 2024-10-30 PROCEDURE — NC001 PR NO CHARGE: Performed by: ORTHOPAEDIC SURGERY

## 2024-10-30 DEVICE — BIO-COMP, SWIVELOCK C, TT, 4.75X19.1MM
Type: IMPLANTABLE DEVICE | Site: SHOULDER | Status: FUNCTIONAL
Brand: ARTHREX®

## 2024-10-30 DEVICE — 4.75MM BC KNOTLESS SWIVELOCK
Type: IMPLANTABLE DEVICE | Site: SHOULDER | Status: FUNCTIONAL
Brand: ARTHREX®

## 2024-10-30 DEVICE — BIO-COMP SWIVELOCK C, CLD 5.5X19.1MM
Type: IMPLANTABLE DEVICE | Site: SHOULDER | Status: FUNCTIONAL
Brand: ARTHREX®

## 2024-10-30 DEVICE — SP FBRTAK RC FBRTPE BLK/BLU & STTPE BLU
Type: IMPLANTABLE DEVICE | Site: SHOULDER | Status: FUNCTIONAL
Brand: ARTHREX®

## 2024-10-30 DEVICE — BIO-COMP, SWIVELOCK C, FT, 4.75X19.1MM
Type: IMPLANTABLE DEVICE | Site: SHOULDER | Status: FUNCTIONAL
Brand: ARTHREX®

## 2024-10-30 RX ORDER — ACETAMINOPHEN 500 MG
1000 TABLET ORAL EVERY 6 HOURS PRN
Qty: 60 TABLET | Refills: 0 | Status: SHIPPED | OUTPATIENT
Start: 2024-10-30

## 2024-10-30 RX ORDER — CEFAZOLIN SODIUM 2 G/50ML
2000 SOLUTION INTRAVENOUS ONCE
Status: COMPLETED | OUTPATIENT
Start: 2024-10-30 | End: 2024-10-30

## 2024-10-30 RX ORDER — ACETAMINOPHEN 325 MG/1
650 TABLET ORAL EVERY 6 HOURS PRN
Status: CANCELLED | OUTPATIENT
Start: 2024-10-30

## 2024-10-30 RX ORDER — LIDOCAINE HYDROCHLORIDE 10 MG/ML
INJECTION, SOLUTION EPIDURAL; INFILTRATION; INTRACAUDAL; PERINEURAL AS NEEDED
Status: DISCONTINUED | OUTPATIENT
Start: 2024-10-30 | End: 2024-10-30

## 2024-10-30 RX ORDER — ONDANSETRON 2 MG/ML
INJECTION INTRAMUSCULAR; INTRAVENOUS AS NEEDED
Status: DISCONTINUED | OUTPATIENT
Start: 2024-10-30 | End: 2024-10-30

## 2024-10-30 RX ORDER — ACETAMINOPHEN 500 MG
1000 TABLET ORAL EVERY 8 HOURS
Qty: 60 TABLET | Refills: 0 | Status: CANCELLED | OUTPATIENT
Start: 2024-10-30

## 2024-10-30 RX ORDER — OXYCODONE HYDROCHLORIDE 5 MG/1
5 TABLET ORAL EVERY 4 HOURS PRN
Qty: 15 TABLET | Refills: 0 | Status: SHIPPED | OUTPATIENT
Start: 2024-10-30 | End: 2024-11-09

## 2024-10-30 RX ORDER — FENTANYL CITRATE 50 UG/ML
INJECTION, SOLUTION INTRAMUSCULAR; INTRAVENOUS AS NEEDED
Status: DISCONTINUED | OUTPATIENT
Start: 2024-10-30 | End: 2024-10-30

## 2024-10-30 RX ORDER — PROPOFOL 10 MG/ML
INJECTION, EMULSION INTRAVENOUS AS NEEDED
Status: DISCONTINUED | OUTPATIENT
Start: 2024-10-30 | End: 2024-10-30

## 2024-10-30 RX ORDER — DEXAMETHASONE SODIUM PHOSPHATE 10 MG/ML
INJECTION, SOLUTION INTRAMUSCULAR; INTRAVENOUS AS NEEDED
Status: DISCONTINUED | OUTPATIENT
Start: 2024-10-30 | End: 2024-10-30

## 2024-10-30 RX ORDER — MIDAZOLAM HYDROCHLORIDE 2 MG/2ML
INJECTION, SOLUTION INTRAMUSCULAR; INTRAVENOUS AS NEEDED
Status: DISCONTINUED | OUTPATIENT
Start: 2024-10-30 | End: 2024-10-30

## 2024-10-30 RX ORDER — METOCLOPRAMIDE HYDROCHLORIDE 5 MG/ML
INJECTION INTRAMUSCULAR; INTRAVENOUS AS NEEDED
Status: DISCONTINUED | OUTPATIENT
Start: 2024-10-30 | End: 2024-10-30

## 2024-10-30 RX ORDER — SODIUM CHLORIDE, SODIUM LACTATE, POTASSIUM CHLORIDE, CALCIUM CHLORIDE 600; 310; 30; 20 MG/100ML; MG/100ML; MG/100ML; MG/100ML
INJECTION, SOLUTION INTRAVENOUS CONTINUOUS PRN
Status: DISCONTINUED | OUTPATIENT
Start: 2024-10-30 | End: 2024-10-30

## 2024-10-30 RX ORDER — ONDANSETRON 2 MG/ML
4 INJECTION INTRAMUSCULAR; INTRAVENOUS EVERY 6 HOURS PRN
Status: CANCELLED | OUTPATIENT
Start: 2024-10-30

## 2024-10-30 RX ORDER — SODIUM CHLORIDE, SODIUM LACTATE, POTASSIUM CHLORIDE, CALCIUM CHLORIDE 600; 310; 30; 20 MG/100ML; MG/100ML; MG/100ML; MG/100ML
125 INJECTION, SOLUTION INTRAVENOUS CONTINUOUS
Status: DISCONTINUED | OUTPATIENT
Start: 2024-10-30 | End: 2024-10-30 | Stop reason: HOSPADM

## 2024-10-30 RX ORDER — CHLORHEXIDINE GLUCONATE ORAL RINSE 1.2 MG/ML
15 SOLUTION DENTAL ONCE
Status: COMPLETED | OUTPATIENT
Start: 2024-10-30 | End: 2024-10-30

## 2024-10-30 RX ORDER — ERGOCALCIFEROL 1.25 MG/1
50000 CAPSULE ORAL WEEKLY
Qty: 8 CAPSULE | Refills: 0 | Status: CANCELLED | OUTPATIENT
Start: 2024-10-30

## 2024-10-30 RX ORDER — MAGNESIUM HYDROXIDE 1200 MG/15ML
LIQUID ORAL AS NEEDED
Status: DISCONTINUED | OUTPATIENT
Start: 2024-10-30 | End: 2024-10-30 | Stop reason: HOSPADM

## 2024-10-30 RX ORDER — FENTANYL CITRATE/PF 50 MCG/ML
25 SYRINGE (ML) INJECTION
Status: DISCONTINUED | OUTPATIENT
Start: 2024-10-30 | End: 2024-10-30 | Stop reason: HOSPADM

## 2024-10-30 RX ORDER — ONDANSETRON 2 MG/ML
4 INJECTION INTRAMUSCULAR; INTRAVENOUS ONCE
Status: DISCONTINUED | OUTPATIENT
Start: 2024-10-30 | End: 2024-10-30 | Stop reason: HOSPADM

## 2024-10-30 RX ORDER — ERGOCALCIFEROL 1.25 MG/1
50000 CAPSULE ORAL WEEKLY
Qty: 8 CAPSULE | Refills: 0 | Status: SHIPPED | OUTPATIENT
Start: 2024-10-30

## 2024-10-30 RX ORDER — BUPIVACAINE HYDROCHLORIDE 5 MG/ML
INJECTION, SOLUTION EPIDURAL; INTRACAUDAL
Status: COMPLETED | OUTPATIENT
Start: 2024-10-30 | End: 2024-10-30

## 2024-10-30 RX ORDER — OXYCODONE HYDROCHLORIDE 5 MG/1
5 TABLET ORAL EVERY 4 HOURS PRN
Status: CANCELLED | OUTPATIENT
Start: 2024-10-30

## 2024-10-30 RX ORDER — OXYCODONE HYDROCHLORIDE 5 MG/1
5 TABLET ORAL EVERY 6 HOURS PRN
Qty: 15 TABLET | Refills: 0 | Status: CANCELLED | OUTPATIENT
Start: 2024-10-30 | End: 2024-11-09

## 2024-10-30 RX ORDER — EPHEDRINE SULFATE 50 MG/ML
INJECTION INTRAVENOUS AS NEEDED
Status: DISCONTINUED | OUTPATIENT
Start: 2024-10-30 | End: 2024-10-30

## 2024-10-30 RX ADMIN — SODIUM CHLORIDE, SODIUM LACTATE, POTASSIUM CHLORIDE, AND CALCIUM CHLORIDE: .6; .31; .03; .02 INJECTION, SOLUTION INTRAVENOUS at 11:56

## 2024-10-30 RX ADMIN — EPHEDRINE SULFATE 5 MG: 50 INJECTION, SOLUTION INTRAVENOUS at 12:53

## 2024-10-30 RX ADMIN — FENTANYL CITRATE 50 MCG: 50 INJECTION, SOLUTION INTRAMUSCULAR; INTRAVENOUS at 14:33

## 2024-10-30 RX ADMIN — CHLORHEXIDINE GLUCONATE 15 ML: 1.2 RINSE ORAL at 11:18

## 2024-10-30 RX ADMIN — BUPIVACAINE 20 ML: 13.3 INJECTION, SUSPENSION, LIPOSOMAL INFILTRATION at 11:45

## 2024-10-30 RX ADMIN — DEXAMETHASONE SODIUM PHOSPHATE 10 MG: 10 INJECTION, SOLUTION INTRAMUSCULAR; INTRAVENOUS at 12:43

## 2024-10-30 RX ADMIN — BUPIVACAINE HYDROCHLORIDE 10 ML: 5 INJECTION, SOLUTION EPIDURAL; INTRACAUDAL; PERINEURAL at 11:45

## 2024-10-30 RX ADMIN — SODIUM CHLORIDE, SODIUM LACTATE, POTASSIUM CHLORIDE, AND CALCIUM CHLORIDE: .6; .31; .03; .02 INJECTION, SOLUTION INTRAVENOUS at 13:20

## 2024-10-30 RX ADMIN — CEFAZOLIN SODIUM 2000 MG: 2 SOLUTION INTRAVENOUS at 12:27

## 2024-10-30 RX ADMIN — MIDAZOLAM 2 MG: 1 INJECTION INTRAMUSCULAR; INTRAVENOUS at 11:45

## 2024-10-30 RX ADMIN — EPHEDRINE SULFATE 5 MG: 50 INJECTION, SOLUTION INTRAVENOUS at 13:05

## 2024-10-30 RX ADMIN — METOCLOPRAMIDE 10 MG: 5 INJECTION, SOLUTION INTRAMUSCULAR; INTRAVENOUS at 12:27

## 2024-10-30 RX ADMIN — PROPOFOL 130 MG: 10 INJECTION, EMULSION INTRAVENOUS at 12:31

## 2024-10-30 RX ADMIN — ONDANSETRON 4 MG: 2 INJECTION INTRAMUSCULAR; INTRAVENOUS at 12:27

## 2024-10-30 RX ADMIN — FENTANYL CITRATE 50 MCG: 50 INJECTION, SOLUTION INTRAMUSCULAR; INTRAVENOUS at 14:09

## 2024-10-30 RX ADMIN — EPHEDRINE SULFATE 5 MG: 50 INJECTION, SOLUTION INTRAVENOUS at 13:08

## 2024-10-30 RX ADMIN — LIDOCAINE HYDROCHLORIDE 50 MG: 10 INJECTION, SOLUTION EPIDURAL; INFILTRATION; INTRACAUDAL; PERINEURAL at 12:30

## 2024-10-30 RX ADMIN — SODIUM CHLORIDE, SODIUM LACTATE, POTASSIUM CHLORIDE, AND CALCIUM CHLORIDE 125 ML/HR: .6; .31; .03; .02 INJECTION, SOLUTION INTRAVENOUS at 11:18

## 2024-10-30 NOTE — ANESTHESIA POSTPROCEDURE EVALUATION
Post-Op Assessment Note    CV Status:  Stable    Pain management: adequate       Mental Status:  Alert and awake   Hydration Status:  Euvolemic   PONV Controlled:  Controlled   Airway Patency:  Patent     Post Op Vitals Reviewed: Yes    No anethesia notable event occurred.    Staff: Anesthesiologist           Last Filed PACU Vitals:  Vitals Value Taken Time   Temp 97.9 °F (36.6 °C) 10/30/24 1610   Pulse 58 10/30/24 1640   /58 10/30/24 1640   Resp 16 10/30/24 1640   SpO2 96 % 10/30/24 1640       Modified Pat:  Activity: 2 (10/30/2024  4:10 PM)  Respiration: 2 (10/30/2024  4:10 PM)  Circulation: 2 (10/30/2024  4:10 PM)  Consciousness: 1 (10/30/2024  4:10 PM)  Oxygen Saturation: 2 (10/30/2024  4:10 PM)  Modified Pat Score: 9 (10/30/2024  4:10 PM)

## 2024-10-30 NOTE — ANESTHESIA PROCEDURE NOTES
Peripheral Block    Patient location during procedure: holding area  Start time: 10/30/2024 11:45 AM  Reason for block: at surgeon's request and post-op pain management  Staffing  Performed by: Zeinab Rodriguez MD  Authorized by: Zeinab Rodriguez MD    Preanesthetic Checklist  Completed: patient identified, IV checked, site marked, risks and benefits discussed, surgical consent, monitors and equipment checked, pre-op evaluation and timeout performed  Peripheral Block  Patient position: supine  Prep: ChloraPrep  Patient monitoring: continuous pulse oximetry, frequent blood pressure checks and heart rate  Block type: Interscalene  Laterality: left  Injection technique: single-shot  Procedures: ultrasound guided, Ultrasound guidance required for the procedure to increase accuracy and safety of medication placement and decrease risk of complications.  Ultrasound permanent image saved  bupivacaine (PF) (MARCAINE) 0.5 % injection 20 mL - Perineural   10 mL - 10/30/2024 11:45:00 AM  bupivacaine liposomal (EXPAREL) 1.3 % injection 20 mL - Perineural   20 mL - 10/30/2024 11:45:00 AM  Needle  Needle type: Stimuplex   Needle gauge: 22 G  Needle length: 4 in  Needle localization: ultrasound guidance  Assessment  Injection assessment: frequent aspiration, incremental injection, needle tip visualized at all times, injected with ease, negative aspiration, negative for heart rate change, no symptoms of intraneural/intravenous injection and no paresthesia on injection  Paresthesia pain: none  Post-procedure:  site cleaned  patient tolerated the procedure well with no immediate complications

## 2024-10-30 NOTE — DISCHARGE INSTR - AVS FIRST PAGE
POSTOPERATIVE INSTRUCTIONS following SHOULDER SURGERY    MEDICATIONS:  Resume all home medications unless otherwise instructed by your surgeon.  Pain Medication:   Take Tylenol on prescribed schedule for 7 days  Take Oxycodone as needed for severe pain   Take Vitamin D 50,000 IU capsule once weekly for 8 weeks following surgery.  Restart Plavix and Aspirin as usually taken on POD 1 (10/31/24)  If you were given a regional anesthetic (nerve block), it is helpful to take your pain medication before the block wear off.    Possible side effects include nausea, constipation, and urinary retention.  If you experience these side effects, please call our office for assistance.  Pain med refills are authorized only during office hours (8am-4pm, Mon-Fri).    WOUND CARE:  Keep the dressing clean and dry.  Light drainage may occur the first 2 days postop.  You may remove the dressings and get the incision wet in the shower 48 hours after surgery.  DO NOT remove steri-strips or sutures.  DO NOT immerse the incision under water.  Carefully pat the incision dry.  If there is wound drainage, re-apply a fresh dry gauze dressing.  Please call our office ([phone]) if you experience either of the following:  Sudden increase in swelling, redness, or warmth at the surgical site  Excessive incisional drainage that persists beyond the 3rd day after surgery  Oral temperature greater than 101 degrees, not relieved with Tylenol  Shortness of breath, chest pain, nausea, or any other concerning symptoms    ICE:  You may use Ice to help with pain control   Place ice on the operative site for 20 minutes at a time when you are in pain     SLING:  Wear your sling AT ALL TIMES (including sleep) until your first postoperative office visit.  You may remove the sling for showering but must keep your arm at your side.    ACTIVITY:   DO NOT lift, carry, push, or pull anything with your operative arm.  Shoulder:  DO NOT actively (on your own) raise  your operative arm away from the side of you body or rotate it out away from your body unless instructed by your surgeon or physical therapist.  Place a pillow behind the elbow while lying down.  Sleeping in a more upright position (recliner) may be more comfortable initially.  Elbow, Wrist, and Finger Motion:  You may take your elbow out of the slight carefully to move your elbow, wrist, and fingers. Follow your motion precautions for the shoulder. Replace the sling immediately after.     PHYSICAL THERAPY:  Begin therapy 5 TO 7 DAYS AFTER SURGERY.  You were given a prescription for therapy at your preoperative office visit or on the day of surgery.  If you do not have physical therapy scheduled yet, please call our office for assistance.    FOLLOW-UP APPOINTMENT:  10-14 days following surgery with:      Dr. Shar Suarez MD    [facility] [address], [address]  [place] [address]    [facility] [address] [address]    Phone:   [phone]

## 2024-10-30 NOTE — PERIOPERATIVE NURSING NOTE
Left shoulder dsg dry, intact. Ice to left shoulder  sling intact,pt able to wiggle fingers of left hand

## 2024-10-30 NOTE — ANESTHESIA PREPROCEDURE EVALUATION
Procedure:  Left shoulder rotator cuff repair, biceps tenodesis, possible open rotator cuff repair, possible allograft augmentation (Left: Shoulder)    Relevant Problems   CARDIO   (+) Benign essential hypertension   (+) Coronary artery disease involving native coronary artery of native heart without angina pectoris   (+) Hyperlipidemia LDL goal <100      /RENAL   (+) Renal cyst, left        Physical Exam    Airway    Mallampati score: II  TM Distance: >3 FB  Neck ROM: full     Dental   No notable dental hx     Cardiovascular  Cardiovascular exam normal    Pulmonary  Pulmonary exam normal     Other Findings        Anesthesia Plan  ASA Score- 3     Anesthesia Type- general with ASA Monitors.         Additional Monitors:     Airway Plan: LMA.           Plan Factors-Exercise tolerance (METS): >4 METS.    Chart reviewed.   Existing labs reviewed. Patient summary reviewed.    Patient is not a current smoker.              Induction- intravenous.    Postoperative Plan- Plan for postoperative opioid use.     Perioperative Resuscitation Plan - Level 1 - Full Code.       Informed Consent- Anesthetic plan and risks discussed with patient.  I personally reviewed this patient with the CRNA. Discussed and agreed on the Anesthesia Plan with the CRNA..

## 2024-10-30 NOTE — H&P
H&P reviewed. After examining the patient I find no changes in the patients condition since the H&P had been written.    Vitals:    10/30/24 1109   BP: 158/77   Pulse: (!) 54   Resp: 18   Temp: 98.2 °F (36.8 °C)   SpO2: 95%

## 2024-10-31 ENCOUNTER — TELEPHONE (OUTPATIENT)
Age: 74
End: 2024-10-31

## 2024-10-31 RX ORDER — METOPROLOL TARTRATE 50 MG
50 TABLET ORAL 2 TIMES DAILY
Qty: 180 TABLET | Refills: 1 | Status: SHIPPED | OUTPATIENT
Start: 2024-10-31

## 2024-10-31 RX ORDER — ROSUVASTATIN CALCIUM 20 MG/1
20 TABLET, COATED ORAL
Qty: 90 TABLET | Refills: 0 | Status: SHIPPED | OUTPATIENT
Start: 2024-10-31

## 2024-10-31 NOTE — TELEPHONE ENCOUNTER
Patient needs updated blood work. Please place orders lipid panel. A 90D courtesy refill was provided (rosuvastatin).

## 2024-10-31 NOTE — TELEPHONE ENCOUNTER
Caller: Patient     Doctor: Erick     Reason for call: Asked for PT script to be sent to PT  Attention- pawel   Fax- [phone]

## 2024-10-31 NOTE — OP NOTE
OPERATIVE REPORT  PATIENT NAME: Santiago Rogers    :  1950  MRN: [id number]  Pt Location: [facility] OR ROOM 01    SURGERY DATE: 10/30/2024    Surgeons and Role:     * Clint Suarez MD - Primary     * Yeni Araujo PA-C - Assisting    The presence of Yeni Araujo PA-C was required for poisitioning, retraction, assistance, and closure. No qualified resident was available.      Preop Diagnosis:  Traumatic complete tear of left rotator cuff, subsequent encounter [S46.012D]  Biceps tendinosis   Glenoid Fracture    Post-Op Diagnosis Codes:     * Traumatic complete tear of left rotator cuff, subsequent encounter [S46.012D]  Biceps tendinosis   Glenoid Fracture     Procedure(s):  Left - Left shoulder rotator cuff repair, extensive debridement, biceps tenotomy     Specimen(s):  * No specimens in log *    Estimated Blood Loss:   Minimal    Drains:  * No LDAs found *    Anesthesia Type:   General w/ Interscalene Block    Operative Indications:      Operative Indications:  74 y.o. male with a left glenohumeral dislocation.  After evaluation in the clinic he was sent for additional imaging including a CT scan and MRI which did show a fracture of the anterior glenoid that was essentially nondisplaced it was decided to treat nonoperatively.  There was also a massive full-thickness rotator cuff tear and biceps instability.  Prior to this injury the patient had normal shoulder function with no issues.  At baseline the patient is active and wished to maintain his level of independence and activity if possible.  We discussed options for operative nonoperative treatment.  Given his very poor shoulder function after the injury I did feel that surgical intervention was his best option at this time.  We did have an extensive discussion about rotator cuff repair versus reverse total shoulder arthroplasty in the setting.  We did discuss that rotator cuff tear would have relatively high risk of failure given its massive size, level of  retraction.  However he had minimal atrophy on his MRI.  Additionally I did have reservations about the ability to get appropriate fixation on the glenoid with a reverse total shoulder arthroplasty given the recent fracture.  We did discuss the relative advantages and disadvantages of each approach.  After discussion we did opt to proceed with rotator cuff repair understanding that if it were to fail in the future that a reverse total shoulder arthroplasty would likely be his best option.  We discussed the risk benefits and alternatives.  He expressed understanding elected to proceed.    The patient was greeted in the preoperative holding area, where history, physical exam, review of surgical plan, and operative site dante were performed. The patient was transferred to the operative suite, placed supine on the operative table.  General endotracheal anesthesia was induced, and perioperative intravenous antibiotics were administered.  The patient was safely repositioned to the beach-chair position with cervical spine in neutral position. All bony prominences were well padded. SCDs were placed on bilateral lower extremities.      Exam under anesthesia was then performed which demonstrated 140 forward flexion, degrees of external rotation, 45 degrees of internal rotation.  No evidence of instability with  load and shift.      The operative shoulder was prepped and draped in usual sterile fashion. A time out was performed with the participation of the entire operative and anesthesia team. Standard posterior glenohumeral and anterior rotator interval portals were established, through which the scope and probe were inserted respectively, which aided and assisted in performance of a complete and thorough diagnostic arthroscopy, which demonstrated the following findings:    Operative Findings:  Humeral head: Grade 1 degenerative changes  Glenoid: 1 degenerative changes, fracture line visible at the anterior glenoid but it is  healing well without obvious chondral defect at the fracture site.  Biceps tendon and biceps anchor:  tendon was unstable and sublux out of the bicipital groove.  There was extensive tendinosis as the tendon after the groove.  There was a large degree of fraying and near full-thickness tearing.  Rotator Cuff: Massive retracted rotator cuff tear including full-thickness tears of the subscapularis, supraspinatus, infraspinatus  Glenoid labrum showed degenerative fraying with no focal tearing  Subacromial space showed extensive bursal inflammation    Diagnostic arthroscopy was completed I then performed an extensive glenohumeral debridement of  frayed labral tissue,  hypertrophic synovium,  degenerative chondral fraying,  edge of the torn rotator cuff, and subacromial bursal inflammation.  Did also perform a release of the anterior capsule as the patient was having stiffness preoperatively that was not present when he first presented to me consistent with early frozen shoulder.  I accomplish this by using an arthroscopic wand to release the anterior capsule along the anterior aspect of the glenoid.  I then turned my attention to the subscapularis repair.  To complete the subscapularis repair I placed a second anterolateral portal.  I placed cannulas in the anterior and lateral portals as well.  I then identified the torn tendon and performed release essentially superiorly and posteriorly to the tendon using a grasper to traction the tendon.  After releases were performed was able to reduce the tendon to the footprint on the humerus without excessive tension.  I did feel this was amenable to repair.  I used an arthroscopic bur to create a bleeding bed of bone at the footprint on the anterior humerus.  I placed 2 anchors on the subscapularis footprint.  Using a scorpion suture passer I placed sutures through the torn tendon.  I tied them using arthroscopic knot pusher to reduce the tendon to the tuberosity  footprint.Suture tails from the subscapularis repair were passed through a swivel lock anchor and advanced into the bone lateral to the footprint to complete a double row repair.  After repair was completed the subscapularis was stable at the appropriate position on the footprint and did have good motion with the humeral head as well.  After completion of the subscapularis repair I turned my attention back to the biceps tendon.  The tendon quality was again found to be very poor.  It was unstable within the groove.  I did consider whether to proceed with incorporating into the supraspinatus repair versus performing tenodesis.  The intra-articular portion of the biceps had extensive tearing.  I used a shaver to debride frayed portions of the tendon.  I did consider placing this into a swivel lock anchor incorporating the supraspinatus repair.  However the tendon quality was very poor and he did not feel the likelihood of this healing was very high.  I used an arthroscopic shaver to complete the arthroscopic tenotomy.  All debris was removed from the glenohumeral joint.      I then placed the arthroscope into the subacromial space. After localization with a spinal needle, I made a lateral incision to establish a lateral portal.  I performed an extensive bursectomy , removed the soft tissue from the undersurface of the acromion.    After bursectomy  was completed evaluation of the rotator cuff again showed full thickness tear of the supraspinatus and infraspinatus.  The tissue was not significantly retracted it was easy to reduce it to the greater tuberosity footprint..  A posterolateral viewing portal was created and the scope was moved to the portal.   Using a keith I created a bleeding bed at the greater tuberosity.      I did place an anchor into the posterior aspect of the greater tuberosity at the infraspinatus footprint.  The first anchor was an all suture anchor which did unfortunately pulled out of the bone  given the poor quality of the bone in that area.  We then upsized to a swivel lock anchor for 7 5 mm that did also pull out of the bone when attempting to tie through the repair.  Finally we upsized to a larger swivel lock anchor which did get adequate fixation of the bone.  Using a scorpion suture passer I passed sutures from the anchor through the infraspinatus and tied them into place using an arthroscopic knot pusher.  Next I placed 3 additional anchors across the greater tuberosity. Using a scorpion suture passer I passed the sutures in a mattress configuration.  I used an arthroscopic knot pusher to tie medial row for the sliding sutures from each anchor.      In order to complete a double row repair, the lateral aspect of the tuberosity was debrided of soft tissue using the radiofrequency ablator. Through the lateral cannula the punch was placed and tapped into position.  One Suture  from each medial row anchor was passed through a swivel lock anchor. The anchor was then placed through the cannulae and into the previously punched hole.  Once anchor was in the hole the sutures were tightened.  With the help of a mallet the anchor was tapped down to the bone.   Suture tension was confirmed.  Next the suture cutter was used to cut the excess suture.   This process was repeated for a second lateral row anchor roughly 2 cm posterior to the first.  The rotator cuff repair was then evaluated with a probe and the scope and felt to have excellent reduction onto the greater tuberosity. The repair was stable during humeral motion and with evaluation with the probe.      The shoulder was copiously irrigated and drained. The arthroscopic wounds were closed with 2-0 Nylon sutures. A sterile dressing and shoulder immobilizer were placed on the operative extremity.  The patient emerged from anesthesia and was taken to the recovery room in stable condition.  There were no apparent complications.       SIGNATURE: Clint  Keshav Suarez MD  DATE: October 31, 2024  TIME: 8:03 AM

## 2024-11-08 ENCOUNTER — TELEPHONE (OUTPATIENT)
Age: 74
End: 2024-11-08

## 2024-11-08 NOTE — TELEPHONE ENCOUNTER
Caller: [facility]    Doctor/Office: Erick HERRERA#: [phone]      What needs to be faxed: OP report    ATTN to: Lora    Fax#: [phone]      Documents were successfully e-faxed

## 2024-11-08 NOTE — TELEPHONE ENCOUNTER
Caller: jordon from [facility]    Doctor: Erick    Reason for call: OP report was not received, please refax OP report to [phone]    Call back#: [phone]

## 2024-11-09 ENCOUNTER — HOSPITAL ENCOUNTER (OUTPATIENT)
Dept: RADIOLOGY | Facility: HOSPITAL | Age: 74
Discharge: HOME | End: 2024-11-09
Attending: FAMILY MEDICINE
Payer: COMMERCIAL

## 2024-11-09 DIAGNOSIS — R93.5 ABNORMAL CT OF THE ABDOMEN: ICD-10-CM

## 2024-11-09 PROCEDURE — 76700 US EXAM ABDOM COMPLETE: CPT

## 2024-11-11 ENCOUNTER — OFFICE VISIT (OUTPATIENT)
Dept: OBGYN CLINIC | Facility: CLINIC | Age: 74
End: 2024-11-11

## 2024-11-11 VITALS
DIASTOLIC BLOOD PRESSURE: 85 MMHG | HEIGHT: 69 IN | SYSTOLIC BLOOD PRESSURE: 147 MMHG | BODY MASS INDEX: 26.22 KG/M2 | WEIGHT: 177 LBS | HEART RATE: 62 BPM

## 2024-11-11 DIAGNOSIS — Z98.890 S/P LEFT ROTATOR CUFF REPAIR: Primary | ICD-10-CM

## 2024-11-11 PROCEDURE — 99024 POSTOP FOLLOW-UP VISIT: CPT | Performed by: ORTHOPAEDIC SURGERY

## 2024-11-11 NOTE — PROGRESS NOTES
Assessment/Plan:  1. S/P left rotator cuff repair          Scribe Attestation      I,:  Guilherme Cortez MA am acting as a scribe while in the presence of the attending physician.:       I,:  Clint Suarez MD personally performed the services described in this documentation    as scribed in my presence.:               Santiago is doing well.  His sutures were removed today and steristrips applied.  There is no evidence of infection.  He should remove the Steri-Strips in 5 days.  If he notices redness at any of the incision sites asked that he call me.  We discussed the signs and symptoms of infection.  He will remain in his sling at all times except for hygiene purposes.  He will attend physical therapy where they will follow the rotator cuff repair protocol.  I will see him back in 4 weeks.    Subjective:   Santiago Rogers is a 74 y.o. male who presents today for 12-day postop visit of the left shoulder rotator cuff repair with biceps tenotomy.  Date of surgery was October 30, 2024.  Santiago states he is doing well.  He has no complaints of pain today.  He last took tylenol for pain last Wednesday.  He denies radiating pain or distal paresthesias.  He denies recent fevers, night sweats or chills.  He remarks on a small amount of redness and itchyness at a portal site.      Review of Systems   Constitutional:  Negative for chills, fever and unexpected weight change.   HENT:  Negative for hearing loss, nosebleeds and sore throat.    Eyes:  Negative for pain, redness and visual disturbance.   Respiratory:  Negative for cough, shortness of breath and wheezing.    Cardiovascular:  Negative for chest pain, palpitations and leg swelling.   Gastrointestinal:  Negative for abdominal pain, nausea and vomiting.   Endocrine: Negative for polyphagia and polyuria.   Genitourinary:  Negative for dysuria and hematuria.   Musculoskeletal:         See HPI   Skin:  Negative for rash and wound.   Neurological:  Negative for dizziness,  numbness and headaches.   Psychiatric/Behavioral:  Negative for decreased concentration and suicidal ideas. The patient is not nervous/anxious.          Past Medical History:   Diagnosis Date    Chest pain     2008  last assessed    Congenital anomaly of coronary artery     2009  last assessed    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 (HCC)     2009  last assessed    Fracture     right lower leg    Hyperlipidemia     Hypertension     Skin neoplasm     [id number] resolved    Wears glasses        Past Surgical History:   Procedure Laterality Date    ANGIOPLASTY      COLONOSCOPY      COLONOSCOPY N/A 9/10/2018    Procedure: COLONOSCOPY;  Surgeon: Prem Friedman MD;  Location: [facility];  Service: Gastroenterology    CORONARY ANGIOPLASTY WITH STENT PLACEMENT      rca stent    FRACTURE SURGERY Right     tibia,fibula fx repair    MS SURGICAL ARTHROSCOPY SHOULDER W/ROTATOR CUFF RPR Left 10/30/2024    Procedure: Left shoulder rotator cuff repair, biceps tenotomy;  Surgeon: Clint Suarez MD;  Location: [facility] OR;  Service: Orthopedics    TONSILLECTOMY      age 6    US GUIDED THYROID BIOPSY  2019       Family History   Problem Relation Age of Onset    Coronary artery disease Father     Heart disease Father     Hypertension Father     Macular degeneration Father     Diabetes Sister     Heart disease Brother         CABG age 46,       Social History     Occupational History    Not on file   Tobacco Use    Smoking status: Former     Current packs/day: 0.00     Average packs/day: 1 pack/day for 20.0 years (20.0 ttl pk-yrs)     Types: Cigarettes     Start date: 6/15/1966     Quit date: 6/15/1986     Years since quittin.4     Passive exposure: Past    Smokeless tobacco: Never   Vaping Use    Vaping status: Never Used   Substance and Sexual Activity    Alcohol use: Yes     Comment: occ    Drug use: No    Sexual activity: Not on file         Current Outpatient Medications:      amLODIPine (NORVASC) 10 mg tablet, TAKE 1 TABLET BY MOUTH DAILY AT  BEDTIME, Disp: 90 tablet, Rfl: 3    aspirin (ECOTRIN LOW STRENGTH) 81 mg EC tablet, Take 1 tablet (81 mg total) by mouth daily, Disp: 30 tablet, Rfl: 0    clopidogrel (PLAVIX) 75 mg tablet, TAKE 1 TABLET BY MOUTH DAILY AT  BEDTIME, Disp: 90 tablet, Rfl: 0    metoprolol tartrate (LOPRESSOR) 50 mg tablet, TAKE 1 TABLET BY MOUTH TWICE  DAILY, Disp: 180 tablet, Rfl: 1    nitroglycerin (Nitrostat) 0.4 mg SL tablet, Place 1 tablet (0.4 mg total) under the tongue every 5 (five) minutes as needed for chest pain, Disp: 25 tablet, Rfl: 0    rosuvastatin (CRESTOR) 20 MG tablet, TAKE 1 TABLET BY MOUTH DAILY AT  BEDTIME, Disp: 90 tablet, Rfl: 0    tadalafil (Cialis) 20 MG tablet, Take 1 tablet (20 mg total) by mouth daily as needed for erectile dysfunction, Disp: 30 tablet, Rfl: 5    triamcinolone (KENALOG) 0.1 % ointment, APPLY TOPICALLY TWO TIMES A DAY TO HANDS (SHORT TERM USE) (GENERIC FOR KENALOG), Disp: 80 g, Rfl: 5    Vitamin D, Ergocalciferol, 08797 units CAPS, Take 50,000 Units by mouth once a week, Disp: 8 capsule, Rfl: 0    acetaminophen (TYLENOL) 500 mg tablet, Take 2 tablets (1,000 mg total) by mouth every 6 (six) hours as needed for mild pain (Patient not taking: Reported on 11/11/2024), Disp: 60 tablet, Rfl: 0    Allergies   Allergen Reactions    Lisinopril Cough       Objective:  Vitals:    11/11/24 1105   BP: 147/85   Pulse: 62       Left Shoulder Exam      Comments:  Mild ecchymosis anteriorly.  No erythema extending past steristrips.    Passive external rotation to 5 degrees with no block.    The left upper extremity is neurovascular intact with normal sensation to light touch and 2+ radial pulse.  There is normal motor function about the hand and fingers.            Physical Exam  Vitals reviewed.   Constitutional:       Appearance: He is well-developed.   HENT:      Head: Normocephalic and atraumatic.   Eyes:      General:         Right eye:  No discharge.         Left eye: No discharge.      Conjunctiva/sclera: Conjunctivae normal.   Cardiovascular:      Rate and Rhythm: Regular rhythm.   Pulmonary:      Effort: Pulmonary effort is normal. No respiratory distress.   Musculoskeletal:      Cervical back: Normal range of motion and neck supple.      Comments: As noted in the HPI.   Skin:     General: Skin is warm and dry.   Neurological:      Mental Status: He is alert and oriented to person, place, and time.   Psychiatric:         Behavior: Behavior normal.               This document was created using speech voice recognition software.   Grammatical errors, random word insertions, pronoun errors, and incomplete sentences are an occasional consequence of this system due to software limitations, ambient noise, and hardware issues.   Any formal questions or concerns about content, text, or information contained within the body of this dictation should be directly addressed to the provider for clarification.

## 2024-11-13 ENCOUNTER — TELEPHONE (OUTPATIENT)
Age: 74
End: 2024-11-13

## 2024-11-13 NOTE — TELEPHONE ENCOUNTER
Called and spoke with Lora and advised PAULETTE Araujo's second message and advised to disregard previous, she states since pt had a subscapularis she wanted to know if he has any limitations for external rotation because that is not addressed in the [facility] protocol. Thanks!

## 2024-11-13 NOTE — TELEPHONE ENCOUNTER
Called and left a voicemail for Lora with Yeni Araujo's message and to call back if any other questions

## 2024-11-13 NOTE — TELEPHONE ENCOUNTER
Caller: Lora with [facility]     Doctor: Erick    Reason for call: Lora is asking if there are any limitations for the subpectoral repair. Stated she did receive the PT referral.    Call back#: [phone]

## 2024-11-14 ENCOUNTER — RESULTS FOLLOW-UP (OUTPATIENT)
Dept: FAMILY MEDICINE CLINIC | Facility: CLINIC | Age: 74
End: 2024-11-14

## 2024-11-14 DIAGNOSIS — N28.1 RENAL CYST, LEFT: Primary | ICD-10-CM

## 2024-11-30 DIAGNOSIS — I25.10 CORONARY ARTERY DISEASE INVOLVING NATIVE CORONARY ARTERY OF NATIVE HEART WITHOUT ANGINA PECTORIS: ICD-10-CM

## 2024-11-30 DIAGNOSIS — I10 BENIGN ESSENTIAL HYPERTENSION: ICD-10-CM

## 2024-12-02 RX ORDER — CLOPIDOGREL BISULFATE 75 MG/1
75 TABLET ORAL
Qty: 90 TABLET | Refills: 1 | Status: SHIPPED | OUTPATIENT
Start: 2024-12-02

## 2024-12-02 RX ORDER — AMLODIPINE BESYLATE 10 MG/1
10 TABLET ORAL
Qty: 90 TABLET | Refills: 1 | Status: SHIPPED | OUTPATIENT
Start: 2024-12-02 | End: 2024-12-12

## 2024-12-03 ENCOUNTER — NURSE TRIAGE (OUTPATIENT)
Age: 74
End: 2024-12-03

## 2024-12-03 LAB
BUN SERPL-MCNC: 11 MG/DL (ref 8–27)
BUN/CREAT SERPL: 11 (ref 10–24)
CALCIUM SERPL-MCNC: 9.3 MG/DL (ref 8.6–10.2)
CHLORIDE SERPL-SCNC: 101 MMOL/L (ref 96–106)
CO2 SERPL-SCNC: 24 MMOL/L (ref 20–29)
CREAT SERPL-MCNC: 1.01 MG/DL (ref 0.76–1.27)
EGFR: 78 ML/MIN/1.73
GLUCOSE SERPL-MCNC: 119 MG/DL (ref 70–99)
POTASSIUM SERPL-SCNC: 4.2 MMOL/L (ref 3.5–5.2)
SODIUM SERPL-SCNC: 140 MMOL/L (ref 134–144)

## 2024-12-03 NOTE — TELEPHONE ENCOUNTER
"Reason for Disposition   Systolic BP >= 130 OR Diastolic >= 80, and history of heart problems, kidney disease, or diabetes mellitus    Additional Information   Negative: Systolic BP >= 130 OR Diastolic >= 80, and is taking BP medications    Answer Assessment - Initial Assessment Questions  Reason for Conversation: hypertension    VS/Weight: (Note: Please include date/time vitals/weight were measured)    BP yesterday /78 HR 66  BP ranging -160 DBP 70-78  Pain: no    Risk Factors: yes    Recent relevant testing and date of testing:   Pt is currently doing Physical therapy for rotator cuff surgery about one month ago  Doing PT currently     Medication:   Amlodipine,metoprolol  Upcoming Office Visit: 5/1/2025    Last Office Visit:         1. BLOOD PRESSURE: \"What is your blood pressure?\" \"Did you take at least two measurements 5 minutes apart?\"      12/2 BP at 830 am 148/76, 12n /76, 4 pm /76  11/20 /62 HR none, 11/25 /78 HR 66 at 425 pm at PT  11/26/2024 /78 HR none  2. ONSET: \"When did you take your blood pressure?\"      6 times a day for the past week    3. HOW: \"How did you take your blood pressure?\" (e.g., automatic home BP monitor, visiting nurse)      Automatic upper arm when takes at home  Physical therapy also is taking manually  4. HISTORY: \"Do you have a history of high blood pressure?\"      hypertension  5. MEDICINES: \"Are you taking any medicines for blood pressure?\" \"Have you missed any doses recently?\"      Metoprolol 50 mg bid, amlodipine 10 mg qhs  6. OTHER SYMPTOMS: \"Do you have any symptoms?\" (e.g., blurred vision, chest pain, difficulty breathing, headache, weakness)      denies    Protocols used: Blood Pressure - High-Adult-[place]    "

## 2024-12-09 ENCOUNTER — OFFICE VISIT (OUTPATIENT)
Dept: OBGYN CLINIC | Facility: CLINIC | Age: 74
End: 2024-12-09

## 2024-12-09 VITALS
HEIGHT: 69 IN | DIASTOLIC BLOOD PRESSURE: 83 MMHG | BODY MASS INDEX: 27.11 KG/M2 | HEART RATE: 60 BPM | SYSTOLIC BLOOD PRESSURE: 159 MMHG | WEIGHT: 183 LBS

## 2024-12-09 DIAGNOSIS — Z98.890 S/P LEFT ROTATOR CUFF REPAIR: Primary | ICD-10-CM

## 2024-12-09 PROCEDURE — 99024 POSTOP FOLLOW-UP VISIT: CPT | Performed by: ORTHOPAEDIC SURGERY

## 2024-12-09 NOTE — PROGRESS NOTES
SUBJECTIVE:  Patient is a 74 y.o. year old male who presents for follow up now 6 weeks s/p  Left shoulder rotator cuff repair, biceps tenotomy - Left performed on  10/30/2024.  Today patient has no significant pain about the left shoulder. He is attending physical therapy, which he feels is allowing him slow progress. He has been compliant with wearing the sling. He denies taking any over the counter pain medications at this time.      VITALS:  Vitals:    12/09/24 1052   BP: 159/83   Pulse: 60       PHYSICAL EXAMINATION:  General: well developed and well nourished, alert, oriented times 3, and appears comfortable  Psychiatric: Normal    MUSCULOSKELETAL EXAMINATION:    Left Upper Extremity    Incision: Well-healed, Clean, dry, and intact  Range of Motion: passive ER 20°, passive FF 80°  Strength: IR 4/5, ER 4/5  +Axillary/AIN/PIN/Ulnar Motor Function   SILT throughout   Palpable Radial pulse       PLAN:    Santiago is a pleasant 74 y.o. male who presents 6 weeks s/p Left shoulder rotator cuff repair, biceps tenotomy - Left on 10/30/2024. He is doing well postoperatively. He may begin to wean out of the sling over the next 2 weeks. He should remove the sling at night once fully weaned out of the sling during the day. He should continue his efforts at physical therapy. He may take over the counter pain medications as needed for pain control. He will follow-up in 6 weeks for re-evaluation of the left shoulder.    Scribe Attestation      I,:  Mame Ferreira am acting as a scribe while in the presence of the attending physician.:       I,:  Clint Suarez MD personally performed the services described in this documentation    as scribed in my presence.:

## 2024-12-10 ENCOUNTER — HOSPITAL ENCOUNTER (OUTPATIENT)
Dept: RADIOLOGY | Facility: HOSPITAL | Age: 74
Discharge: HOME | End: 2024-12-10
Payer: COMMERCIAL

## 2024-12-10 DIAGNOSIS — G93.0 ARACHNOID CYST: ICD-10-CM

## 2024-12-10 PROCEDURE — A9585 GADOBUTROL INJECTION: HCPCS | Performed by: PHYSICIAN ASSISTANT

## 2024-12-10 PROCEDURE — 70553 MRI BRAIN STEM W/O & W/DYE: CPT

## 2024-12-10 RX ORDER — GADOBUTROL 604.72 MG/ML
8 INJECTION INTRAVENOUS
Status: COMPLETED | OUTPATIENT
Start: 2024-12-10 | End: 2024-12-10

## 2024-12-10 RX ADMIN — GADOBUTROL 8 ML: 604.72 INJECTION INTRAVENOUS at 14:59

## 2024-12-12 DIAGNOSIS — I10 BENIGN ESSENTIAL HYPERTENSION: Primary | ICD-10-CM

## 2024-12-12 RX ORDER — AMLODIPINE AND VALSARTAN 5; 160 MG/1; MG/1
1 TABLET ORAL DAILY
Qty: 30 TABLET | Refills: 1 | Status: SHIPPED | OUTPATIENT
Start: 2024-12-12

## 2024-12-12 RX ORDER — CARVEDILOL 12.5 MG/1
12.5 TABLET ORAL 2 TIMES DAILY WITH MEALS
Qty: 60 TABLET | Refills: 1 | Status: SHIPPED | OUTPATIENT
Start: 2024-12-12

## 2024-12-12 NOTE — TELEPHONE ENCOUNTER
Patient is allergic to benzapril    Lets start him on Exforge 5/180 mg daily.  And Coreg 12.5 twice a day.  Discontinue amlodipine and metoprolol.  He should come in our office for BP check up and BMP in 2 weeks.  Let me know if he has any allergic reaction to ARB's.

## 2024-12-12 NOTE — TELEPHONE ENCOUNTER
Amlodipine  to a new medication called lotrel 5/10 mg daily he should come for blood pressure check next week.  And again get a BMP done in 10 days.

## 2024-12-12 NOTE — TELEPHONE ENCOUNTER
Continue metoprolol    Him on Coreg 12.5 twice a day.  He should call us with the blood pressure continue amlodipine as before.

## 2024-12-12 NOTE — TELEPHONE ENCOUNTER
Pt called in stating that his BP is still elevated at times. He states mostly after physical activity. He states that when his systolic blood pressure is around 160 then he can't have physical therapy. Pt states he has been taking Amlodipine 15mg daily and Metoprolol 50mg BID. Please see BP readings below.     12/6   840am-/71   10am- /70  1pm- /77  5pm-162/69   6pm-/73    12/7  8am-/60  945am-/62  11:15am-/68  1pm-/73  430pm-/69  6pm-/73    12/8  915am-/70  1030am-/77  12:15pm- /73  1:30pm-/70  4pm-/63    12/9-skipped   12/10  10am-/65  11:15am-/56  1:15pm-/77  3:15pm 154/72  5:15pm-/72    12/11  10:15am- /79  11:15am-/72  1pm- 164/78  2pm at physical therapy-/78  220pm-/78  3:15pm-/65    12/12  8am- /71

## 2024-12-12 NOTE — TELEPHONE ENCOUNTER
Called spoke with pt, made him aware of new medications and which to d/c , he expressed understanding and will  new script from pharmacy

## 2024-12-17 ENCOUNTER — OFFICE VISIT (OUTPATIENT)
Dept: NEUROSURGERY | Facility: CLINIC | Age: 74
End: 2024-12-17
Payer: COMMERCIAL

## 2024-12-17 VITALS
BODY MASS INDEX: 27.74 KG/M2 | SYSTOLIC BLOOD PRESSURE: 160 MMHG | OXYGEN SATURATION: 97 % | WEIGHT: 183 LBS | HEART RATE: 61 BPM | TEMPERATURE: 97.3 F | DIASTOLIC BLOOD PRESSURE: 70 MMHG | HEIGHT: 68 IN

## 2024-12-17 DIAGNOSIS — G93.0 ARACHNOID CYST: Primary | ICD-10-CM

## 2024-12-17 PROCEDURE — 99213 OFFICE O/P EST LOW 20 MIN: CPT | Performed by: NURSE PRACTITIONER

## 2024-12-17 NOTE — PROGRESS NOTES
Name: Santiago Rogers      : 1950      MRN: [id number]  Encounter Provider: NOAM Pardo  Encounter Date: 2024   Encounter department: [facility] NEUROSURGICAL [facility]  :  Assessment & Plan  Arachnoid cyst  Presents for 6-months follow up surveillance of incidental L posterior fossa arachnoid cyst  Discovered  on imaging completed as part of work up s/p fall 3 feet.  Denies any HA, dizziness, vision or balance issues.    Imaging:  MRI brain w/wo, 12/10/24: 1. Stable posterior fossa arachnoid cyst with unchanged mass effect. 2. No acute process or enhancing lesion seen.    Plan:  Reviewed imaging extensively with patient and his SO.  Discussed natural history of arachnoid cyst and stability of findings.  No need for any further follow up or surveillance.             History of Present Illness     With past medical history of hyperlipidemia, prediabetes, CAD s/p coronary artery stent, hypertension, right carotid artery stenosis, on aspirin and Plavix, who presents for follow-up surveillance of incidentally discovered left posterior fossa arachnoid cyst.  This was discovered incidentally on imaging completed as part of trauma workup after he sustained a fall off of a loft about 3 to 4 feet in 2024.  He denies any history of headaches or dizziness.  He denies any balance issues.  Denies any visual issues.    He is status post shoulder surgery in October.  He states he has been recovering well.  He is accompanied today by his wife.      Review of Systems   Constitutional: Negative.    HENT: Negative.  Negative for tinnitus.    Eyes:  Positive for visual disturbance (history of floaters).   Respiratory: Negative.     Cardiovascular: Negative.    Gastrointestinal: Negative.    Endocrine: Negative.    Genitourinary: Negative.  Negative for frequency and urgency.   Musculoskeletal: Negative.    Skin: Negative.    Allergic/Immunologic: Negative.    Neurological: Negative.  Negative  "for dizziness, speech difficulty, weakness, light-headedness, numbness and headaches.        3 mo F/U  Arachnoid cyst    MRI Brain 12/10/24     Plavix, aspirin/Former smoker/No previous brain sx    Hematological: Negative.    Psychiatric/Behavioral: Negative.  Negative for sleep disturbance.    All other systems reviewed and are negative.   I have personally reviewed the MA's review of systems and made changes as necessary.    Pertinent Medical History            Objective   /70 (Patient Position: Sitting, Cuff Size: Standard)   Pulse 61   Temp (!) 97.3 °F (36.3 °C) (Temporal)   Ht 5' 8\" (1.727 m)   Wt 83 kg (183 lb)   SpO2 97%   BMI 27.83 kg/m²     Physical Exam  Constitutional:       General: He is not in acute distress.     Appearance: He is well-developed. He is not diaphoretic.   Eyes:      General:         Right eye: No discharge.         Left eye: No discharge.      Extraocular Movements: EOM normal.      Conjunctiva/sclera: Conjunctivae normal.      Pupils: Pupils are equal, round, and reactive to light.   Pulmonary:      Effort: Pulmonary effort is normal. No respiratory distress.   Abdominal:      General: There is no distension.      Tenderness: There is no abdominal tenderness.   Musculoskeletal:         General: Normal range of motion.      Cervical back: Normal range of motion and neck supple.   Skin:     General: Skin is warm and dry.   Neurological:      Mental Status: He is alert and oriented to person, place, and time.      Cranial Nerves: No cranial nerve deficit.      Motor: Motor strength is normal.     Coordination: Finger-Nose-Finger Test normal.      Gait: Gait is intact.      Deep Tendon Reflexes: Reflexes normal.   Psychiatric:         Speech: Speech normal.         Behavior: Behavior normal.         Thought Content: Thought content normal.         Judgment: Judgment normal.       Neurologic Exam     Mental Status   Oriented to person, place, and time.   Oriented to person. "   Oriented to place.   Oriented to time.   Registration: recalls 3 of 3 objects. Follows 1 step commands.   Attention: normal. Concentration: normal.   Speech: speech is normal   Level of consciousness: alert  Knowledge: good and consistent with education. Able to perform simple calculations.   Able to name object.     Cranial Nerves     CN II   Visual acuity: normal  Right visual field deficit: none  Left visual field deficit: none     CN III, IV, VI   Pupils are equal, round, and reactive to light.  Extraocular motions are normal.   Right pupil: Size: 3 mm. Shape: regular. Reactivity: brisk. Consensual response: intact. Accommodation: intact.   Left pupil: Size: 3 mm. Shape: regular. Reactivity: brisk. Consensual response: intact. Accommodation: intact.   CN III: no CN III palsy  CN VI: no CN VI palsy  Nystagmus: none   Conjugate gaze: present    CN V   Right facial sensation deficit: none  Left facial sensation deficit: none    CN VII   Right facial weakness: none  Left facial weakness: none    CN VIII   Hearing: intact    CN IX, X   Palate: symmetric    CN XI   Right sternocleidomastoid strength: normal  Left sternocleidomastoid strength: normal  Right trapezius strength: normal  Left trapezius strength: normal    CN XII   Tongue: not atrophic  Fasciculations: absent  Tongue deviation: none    Motor Exam   Muscle bulk: normal  Overall muscle tone: normal    Strength   Strength 5/5 throughout.     Sensory Exam   Light touch normal.     Gait, Coordination, and Reflexes     Gait  Gait: normal    Coordination   Finger to nose coordination: normal    Tremor   Resting tremor: absent  Intention tremor: absent  Action tremor: absent    Reflexes   Reflexes 2+ except as noted.   Right Dahl: absent  Left Dahl: absent  Right ankle clonus: absent  Left ankle clonus: absent      SL AMB Radiology Results Review: I personally reviewed the following image studies in PACS and associated radiology reports: MRI brain. My  interpretation of the radiology images/reports is: as above.    Administrative Statements   I have spent a total time of 30 minutes in caring for this patient on the day of the visit/encounter including Diagnostic results, Instructions for management, Patient and family education, Importance of tx compliance, Risk factor reductions, Impressions, Counseling / Coordination of care, Documenting in the medical record, Reviewing / ordering tests, medicine, procedures  , and Obtaining or reviewing history  .

## 2025-01-06 DIAGNOSIS — E78.5 DYSLIPIDEMIA: ICD-10-CM

## 2025-01-08 RX ORDER — ROSUVASTATIN CALCIUM 20 MG/1
20 TABLET, COATED ORAL
Qty: 90 TABLET | Refills: 3 | Status: SHIPPED | OUTPATIENT
Start: 2025-01-08

## 2025-01-14 DIAGNOSIS — I10 BENIGN ESSENTIAL HYPERTENSION: ICD-10-CM

## 2025-01-14 NOTE — TELEPHONE ENCOUNTER
Medication: carvedilol    Dose/Frequency: 12.5 mg BID    Quantity: 180    Pharmacy: [facility]    Office:   [] PCP/Provider -   [x] Speciality/Provider -     Does the patient have enough for 3 days?   [x] Yes   [] No - Send as HP to POD    Medication: amlodipine-valsartan 5-160 mg    Dose/Frequency: 5-160 mg daily    Quantity: 90    Pharmacy: [facility]    Office:   [] PCP/Provider -   [x] Speciality/Provider -     Does the patient have enough for 3 days?   [x] Yes   [] No - Send as HP to POD

## 2025-01-15 RX ORDER — CARVEDILOL 12.5 MG/1
12.5 TABLET ORAL 2 TIMES DAILY WITH MEALS
Qty: 180 TABLET | Refills: 1 | Status: SHIPPED | OUTPATIENT
Start: 2025-01-15

## 2025-01-15 RX ORDER — AMLODIPINE AND VALSARTAN 5; 160 MG/1; MG/1
1 TABLET ORAL DAILY
Qty: 180 TABLET | Refills: 1 | Status: SHIPPED | OUTPATIENT
Start: 2025-01-15

## 2025-01-20 ENCOUNTER — OFFICE VISIT (OUTPATIENT)
Dept: OBGYN CLINIC | Facility: CLINIC | Age: 75
End: 2025-01-20

## 2025-01-20 VITALS — BODY MASS INDEX: 27.28 KG/M2 | WEIGHT: 184.2 LBS | HEIGHT: 69 IN

## 2025-01-20 DIAGNOSIS — Z98.890 S/P LEFT ROTATOR CUFF REPAIR: Primary | ICD-10-CM

## 2025-01-20 DIAGNOSIS — Z47.89 AFTERCARE FOLLOWING SURGERY OF THE MUSCULOSKELETAL SYSTEM: ICD-10-CM

## 2025-01-20 PROCEDURE — 99024 POSTOP FOLLOW-UP VISIT: CPT | Performed by: ORTHOPAEDIC SURGERY

## 2025-01-20 NOTE — PROGRESS NOTES
"SUBJECTIVE:  Patient is a 74 y.o. year old male who presents for follow up now nearly 12 weeks s/p Left shoulder rotator cuff repair, biceps tenotomy - Left performed on  10/30/2024.  Today, the patient states he has no significant shoulder pain beyond soreness related to PT sessions and the recovery process has exceeded his expectations so far. He notes ADLs are getting easier. The patient denies new injury/trauma or numbness/tingling. He continues to attend PT consistently per protocol, and is \"under the impression that I am behind with my motion but continue to make progress every session.\"      PHYSICAL EXAMINATION:  General: well developed and well nourished, alert, oriented times 3, and appears comfortable  Psychiatric: Normal    MUSCULOSKELETAL EXAMINATION:    Left Upper Extremity    Incision: well-healed  Range of Motion: FF nearly 90 actively, 120 passively without significant pain but resistance; ER 45; IR T12 without pain  - Lift Off Test  Strength: 4/5 ER, 5/5 IR  Unable to hold Empty Can position actively  +Axillary/AIN/PIN/Ulnar Motor Function   SILT throughout   Palpable Radial pulse       PLAN:    On exam today, Santiago does continue to make progressions. He has persistent weakness which is not surprising given the massive nature of his tear.  As he continues to progress into the neck stages of physical therapy and hoping he makes more progress in terms of strength and motion.  Santiago will continue physical therapy per provided protocol, starting rotator cuff strengthening/resistance work. We discussed continuing ADLs as tolerated without attempting activities beyond what he is doing in therapy. Santiago can use OTC medications as needed for pain control. We will follow up in 6 weeks for reevaluation.     "

## 2025-02-04 DIAGNOSIS — I10 BENIGN ESSENTIAL HYPERTENSION: ICD-10-CM

## 2025-02-05 RX ORDER — CARVEDILOL 12.5 MG/1
TABLET ORAL
Qty: 60 TABLET | Refills: 2 | Status: SHIPPED | OUTPATIENT
Start: 2025-02-05

## 2025-02-11 ENCOUNTER — TELEPHONE (OUTPATIENT)
Dept: OBGYN CLINIC | Facility: HOSPITAL | Age: 75
End: 2025-02-11

## 2025-02-11 NOTE — TELEPHONE ENCOUNTER
Caller: patient     Doctor: Dr. mcfarland    Reason for call: patient received VM, called back     Call back#: [phone]

## 2025-03-03 ENCOUNTER — OFFICE VISIT (OUTPATIENT)
Dept: OBGYN CLINIC | Facility: CLINIC | Age: 75
End: 2025-03-03
Payer: COMMERCIAL

## 2025-03-03 VITALS — WEIGHT: 185 LBS | HEIGHT: 69 IN | BODY MASS INDEX: 27.4 KG/M2

## 2025-03-03 DIAGNOSIS — Z98.890 S/P LEFT ROTATOR CUFF REPAIR: Primary | ICD-10-CM

## 2025-03-03 PROCEDURE — 99213 OFFICE O/P EST LOW 20 MIN: CPT | Performed by: ORTHOPAEDIC SURGERY

## 2025-03-03 NOTE — PROGRESS NOTES
"Patient Name: Santiago Rogers      : 1950       MRN: [id number]   Encounter Provider: Clint Suarez MD   Encounter Date: 25  Encounter department: [facility] [place]         Assessment & Plan  S/P left rotator cuff repair  Santiago continues to make interval improvements and is subjectively very pleased with his overall shoulder function. As previously discussed, his persistent weakness is not entirely surprising given his massive tear. I recommend he continue attending formal PT per protocol to work on rotator cuff strengthening and improving overall functionality according to the patient's activity levels. We emphasized that continued exercises will be beneficial. Santiago can use OTC medications and ice/heat as needed for pain control. Follow up in 6-8 weeks for recheck.              _____________________________________________________  CHIEF COMPLAINT:  Chief Complaint   Patient presents with    Left Shoulder - Post-op         SUBJECTIVE:  Patient is a 74 y.o. year old male who presents for follow up now 4 months s/p Left shoulder rotator cuff repair, biceps tenotomy - Left performed on 10/30/2024.  Today, the patient states he is \"grateful for your work\" and feels his shoulder function is \"almost back to normal\". He continues to attend formal PT consistently and is scheduled to continue through the end of this month. He states he anticipates transitioning to home exercises at that point. Santiago states ADLs are getting easier as well. The patient denies new injuries/trauma.     _____________________________________________________  PHYSICAL EXAM:  General/Constitutional: NAD, well developed, well nourished  HENT: Normocephalic, atraumatic  CV: Intact distal pulses, regular rate  Resp: No respiratory distress or labored breathing  Abdomen: soft, nondistended   Lymphatic: No lymphadenopathy palpated  Neuro: Alert and Oriented x 3, no focal deficits  Psych: Normal mood, " normal affect  Skin: Warm, dry, no rashes, no erythema    MUSCULOSKELETAL EXAMINATION:    Left Upper Extremity    Incision: well-healed  Range of Motion: FF 90 compared to 100 contralaterally, ER 45 compared to 60+ contralaterally, IR T10 and symmetric without pain  Strength: 4/5 Empty Can, 4+/5 ER, 5/5 IR  Able to actively hold Belly Press position without pain  - Lift Off Test  +Axillary/AIN/PIN/Ulnar Motor Function   SILT throughout   Palpable Radial pulse       Scribe Attestation      I,:  Yeni Araujo PA-C am acting as a scribe while in the presence of the attending physician.:       I,:  Clint Suarez MD personally performed the services described in this documentation    as scribed in my presence.:

## 2025-04-04 ENCOUNTER — HOSPITAL ENCOUNTER (OUTPATIENT)
Dept: RADIOLOGY | Facility: HOSPITAL | Age: 75
Discharge: HOME | End: 2025-04-04
Attending: STUDENT IN AN ORGANIZED HEALTH CARE EDUCATION/TRAINING PROGRAM
Payer: COMMERCIAL

## 2025-04-04 DIAGNOSIS — I65.21 CAROTID STENOSIS, RIGHT: ICD-10-CM

## 2025-04-04 PROCEDURE — 93880 EXTRACRANIAL BILAT STUDY: CPT

## 2025-04-05 PROCEDURE — 93880 EXTRACRANIAL BILAT STUDY: CPT | Performed by: SURGERY

## 2025-04-07 ENCOUNTER — TELEPHONE (OUTPATIENT)
Dept: VASCULAR SURGERY | Facility: CLINIC | Age: 75
End: 2025-04-07

## 2025-04-07 NOTE — TELEPHONE ENCOUNTER
Caller:  Santiago    Doctor: Dr. Santiago Lawrence     Reason for call:  Patient calling back to ask if appt can be virtual before scheduling anything.     Call back#: [phone]

## 2025-04-07 NOTE — TELEPHONE ENCOUNTER
Attempted to contact patient to schedule appointment(s) listed below.  Requested patient call [phone] to schedule appointment(s).    Patient's appointment(s) are due now.    Dopplers  [] Abdominal Aorta Iliac (AOIL)  [x] Carotid (CV)   [] Celiac and/or Mesenteric  [] Endovascular Aortic Repair (EVAR)   [] Hemodialysis Access (HD)   [] Lower Limb Arterial (MACHELLE)  [] Lower Limb Venous (LEV)  [] Lower Limb Venous Duplex with Reflux (LEVDR)  [] Renal Artery  [] Upper Limb Arterial (UEA)    [] Upper Limb Venous (UEV)              [] LAILA and Waveform analysis     Advanced Imaging   [] CTA head/neck    [] CTA abdomen    [] CTA abdomen & pelvis    [] CT abdomen with/ without contrast  [] CT abdomen with contrast  [] CT abdomen without contrast    [] CT abdomen & pelvis with/ without contrast  [] CT abdomen & pelvis with contrast  [] CT abdomen & pelvis without contrast    Office Visit   [] New patient, patient last seen over 3 years ago  [] Risk factor modification (RFM)   [x] Follow up   [] Lost to follow up (LTFU)   Called patient & s/w wife she will give pt message to call us back /6 MO OV /rr cv 4/5/25

## 2025-04-11 ENCOUNTER — NEW PATIENT (OUTPATIENT)
Dept: URBAN - METROPOLITAN AREA CLINIC 6 | Facility: CLINIC | Age: 75
End: 2025-04-11

## 2025-04-11 DIAGNOSIS — H40.013: ICD-10-CM

## 2025-04-11 DIAGNOSIS — H04.123: ICD-10-CM

## 2025-04-11 DIAGNOSIS — H25.813: ICD-10-CM

## 2025-04-11 PROCEDURE — 76514 ECHO EXAM OF EYE THICKNESS: CPT

## 2025-04-11 PROCEDURE — 92134 CPTRZ OPH DX IMG PST SGM RTA: CPT | Mod: NC

## 2025-04-11 PROCEDURE — 92133 CPTRZD OPH DX IMG PST SGM ON: CPT

## 2025-04-11 PROCEDURE — 92132 CPTRZD OPH DX IMG ANT SGM: CPT

## 2025-04-11 PROCEDURE — 92004 COMPRE OPH EXAM NEW PT 1/>: CPT

## 2025-04-11 PROCEDURE — 92020 GONIOSCOPY: CPT

## 2025-04-11 ASSESSMENT — VISUAL ACUITY
OS_CC: 20/30+1
OD_CC: 20/30-2

## 2025-04-11 ASSESSMENT — KERATOMETRY
OS_AXISANGLE_DEGREES: 96
OD_AXISANGLE_DEGREES: 76
OD_K1POWER_DIOPTERS: 43.25
OS_AXISANGLE2_DEGREES: 6
OS_K2POWER_DIOPTERS: 44.25
OD_AXISANGLE2_DEGREES: 166
OS_K1POWER_DIOPTERS: 43.00
OD_K2POWER_DIOPTERS: 44.50

## 2025-04-11 ASSESSMENT — TONOMETRY
OS_IOP_MMHG: 24
OD_IOP_MMHG: 22
OS_IOP_MMHG: 24
OD_IOP_MMHG: 24

## 2025-04-11 ASSESSMENT — PACHYMETRY
OD_CT_UM: 562
OS_CT_UM: 592

## 2025-04-16 ENCOUNTER — RX CHECK (OUTPATIENT)
Dept: URBAN - METROPOLITAN AREA CLINIC 6 | Facility: CLINIC | Age: 75
End: 2025-04-16

## 2025-04-16 DIAGNOSIS — H52.13: ICD-10-CM

## 2025-04-16 PROCEDURE — 92015 DETERMINE REFRACTIVE STATE: CPT

## 2025-04-16 ASSESSMENT — KERATOMETRY
OD_K2POWER_DIOPTERS: 44.50
OD_K1POWER_DIOPTERS: 43.25
OS_AXISANGLE_DEGREES: 96
OS_K1POWER_DIOPTERS: 43.00
OD_AXISANGLE_DEGREES: 76
OS_AXISANGLE2_DEGREES: 6
OD_AXISANGLE2_DEGREES: 166
OS_K2POWER_DIOPTERS: 44.25

## 2025-04-16 ASSESSMENT — TONOMETRY
OD_IOP_MMHG: 19
OS_IOP_MMHG: 11

## 2025-04-16 ASSESSMENT — VISUAL ACUITY
OS_CC: 20/40
OU_CC: J3
OS_PH: 20/30
OD_CC: 20/30+1

## 2025-04-28 ENCOUNTER — OFFICE VISIT (OUTPATIENT)
Dept: OBGYN CLINIC | Facility: CLINIC | Age: 75
End: 2025-04-28
Payer: COMMERCIAL

## 2025-04-28 VITALS — HEIGHT: 69 IN | BODY MASS INDEX: 27.55 KG/M2 | WEIGHT: 186 LBS

## 2025-04-28 DIAGNOSIS — Z98.890 S/P LEFT ROTATOR CUFF REPAIR: Primary | ICD-10-CM

## 2025-04-28 DIAGNOSIS — Z47.89 AFTERCARE FOLLOWING SURGERY OF THE MUSCULOSKELETAL SYSTEM: ICD-10-CM

## 2025-04-28 PROCEDURE — 99213 OFFICE O/P EST LOW 20 MIN: CPT | Performed by: ORTHOPAEDIC SURGERY

## 2025-04-28 RX ORDER — ERYTHROMYCIN 5 MG/G
OINTMENT OPHTHALMIC
COMMUNITY
Start: 2025-04-03

## 2025-04-28 NOTE — PROGRESS NOTES
"Patient Name: Santiago Rogers      : 1950       MRN: [id number]   Encounter Provider: Clint Suarez MD   Encounter Date: 25  Encounter department: [facility] [place]         Assessment & Plan  S/P left rotator cuff repair            Plan:  Santiago is doing well.  He is showing interval improvements.  He has much improvement with external rotation strength and motion today.  He continues to have some weakness with shoulder abduction.  He is very happy with his function this time despite this ongoing weakness.  He is very pleased with his progress.  I encouraged him to keep up with his home exercise program.  He can now follow-up with me as needed for this if he develops worsening pain or if his ongoing weakness becomes more of a problem for him.    Follow-up:  Return if symptoms worsen or fail to improve.     _____________________________________________________  CHIEF COMPLAINT:  Chief Complaint   Patient presents with    Left Shoulder - Post-op         SUBJECTIVE:  Santiago Rogers is a 75 y.o. male who presents today for 6-month postop visit for left shoulder rotator cuff repair with biceps tenotomy.  Date of surgery was 2024.  Santiago states he is doing well.  He states he feels approximately 95% his normal self.  He has returned to normal daily living activities.  His last formal physical therapy session was last week. He has been performing daily flexibility exercises and strength exercises every other day.  He has no complaints today and is very pleased with his progress. \"I have exceeded my expectations.\"    PAST MEDICAL HISTORY:  Past Medical History:   Diagnosis Date    Chest pain     2008  last assessed    Congenital anomaly of coronary artery     2009  last assessed    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 (HCC)     2009  last assessed    Fracture     right lower leg    Hyperlipidemia     Hypertension     Skin neoplasm     " [id number] resolved    Wears glasses        PAST SURGICAL HISTORY:  Past Surgical History:   Procedure Laterality Date    ANGIOPLASTY      COLONOSCOPY      COLONOSCOPY N/A 9/10/2018    Procedure: COLONOSCOPY;  Surgeon: Prem Friedman MD;  Location: [facility] GI LAB;  Service: Gastroenterology    CORONARY ANGIOPLASTY WITH STENT PLACEMENT      rca stent    FRACTURE SURGERY Right     tibia,fibula fx repair    DC SURGICAL ARTHROSCOPY SHOULDER W/ROTATOR CUFF RPR Left 10/30/2024    Procedure: Left shoulder rotator cuff repair, biceps tenotomy;  Surgeon: Clint Suarez MD;  Location: [facility];  Service: Orthopedics    TONSILLECTOMY      age 6    US GUIDED THYROID BIOPSY  2019       FAMILY HISTORY:  Family History   Problem Relation Age of Onset    Coronary artery disease Father     Heart disease Father     Hypertension Father     Macular degeneration Father     Diabetes Sister     Heart disease Brother         CABG age 46,       SOCIAL HISTORY:  Social History     Tobacco Use    Smoking status: Former     Current packs/day: 0.00     Average packs/day: 1 pack/day for 20.0 years (20.0 ttl pk-yrs)     Types: Cigarettes     Start date: 6/15/1966     Quit date: 6/15/1986     Years since quittin.8     Passive exposure: Past    Smokeless tobacco: Never   Vaping Use    Vaping status: Never Used   Substance Use Topics    Alcohol use: Yes     Comment: occ    Drug use: No       MEDICATIONS:    Current Outpatient Medications:     acetaminophen (TYLENOL) 500 mg tablet, Take 2 tablets (1,000 mg total) by mouth every 6 (six) hours as needed for mild pain, Disp: 60 tablet, Rfl: 0    amLODIPine-valsartan (EXFORGE) 5-160 MG per tablet, Take 1 tablet by mouth daily, Disp: 180 tablet, Rfl: 1    aspirin (ECOTRIN LOW STRENGTH) 81 mg EC tablet, Take 1 tablet (81 mg total) by mouth daily, Disp: 30 tablet, Rfl: 0    carvedilol (COREG) 12.5 mg tablet, TAKE ONE TABLET BY MOUTH TWICE A DAY WITH MEALS (GENERIC FOR COREG), Disp: 60  "tablet, Rfl: 2    clopidogrel (PLAVIX) 75 mg tablet, TAKE 1 TABLET BY MOUTH DAILY AT  BEDTIME, Disp: 90 tablet, Rfl: 1    erythromycin (ILOTYCIN) ophthalmic ointment, PRN, Disp: , Rfl:     nitroglycerin (Nitrostat) 0.4 mg SL tablet, Place 1 tablet (0.4 mg total) under the tongue every 5 (five) minutes as needed for chest pain, Disp: 25 tablet, Rfl: 0    rosuvastatin (CRESTOR) 20 MG tablet, TAKE 1 TABLET BY MOUTH DAILY AT  BEDTIME, Disp: 90 tablet, Rfl: 3    tadalafil (Cialis) 20 MG tablet, Take 1 tablet (20 mg total) by mouth daily as needed for erectile dysfunction, Disp: 30 tablet, Rfl: 5    triamcinolone (KENALOG) 0.1 % ointment, APPLY TOPICALLY TWO TIMES A DAY TO HANDS (SHORT TERM USE) (GENERIC FOR KENALOG), Disp: 80 g, Rfl: 5    Vitamin D, Ergocalciferol, 39781 units CAPS, Take 50,000 Units by mouth once a week (Patient not taking: Reported on 4/28/2025), Disp: 8 capsule, Rfl: 0    ALLERGIES:  Allergies   Allergen Reactions    Lisinopril Cough       LABS:  HgA1c:   Lab Results   Component Value Date    HGBA1C 6.3 (H) 03/16/2023     BMP:   Lab Results   Component Value Date    GLUCOSE 107 (H) 11/13/2017    CALCIUM 8.8 09/17/2024     11/13/2017    K 4.2 12/02/2024    CO2 24 12/02/2024     12/02/2024    BUN 11 12/02/2024    CREATININE 1.01 12/02/2024     CBC: No components found for: \"CBC\"    _____________________________________________________  Review of Systems   Constitutional:  Negative for chills, fever and unexpected weight change.   HENT:  Negative for hearing loss, nosebleeds and sore throat.    Eyes:  Negative for pain, redness and visual disturbance.   Respiratory:  Negative for cough, shortness of breath and wheezing.    Cardiovascular:  Negative for chest pain, palpitations and leg swelling.   Gastrointestinal:  Negative for abdominal pain, nausea and vomiting.   Endocrine: Negative for polyphagia and polyuria.   Genitourinary:  Negative for dysuria and hematuria.   Musculoskeletal:        "  See HPI   Skin:  Negative for rash and wound.   Neurological:  Negative for dizziness, numbness and headaches.   Psychiatric/Behavioral:  Negative for decreased concentration and suicidal ideas. The patient is not nervous/anxious.         Left Shoulder Exam     Tenderness   The patient is experiencing no tenderness.     Range of Motion   Active abduction:  110   Passive abduction:  160   External rotation:  60   Forward flexion:  110   Internal rotation 0 degrees:  T12     Muscle Strength   Left shoulder normal muscle strength: 4+/5 abduction.  Abduction: 4/5   Internal rotation: 5/5   External rotation: 5/5   Supraspinatus: 4/5   Subscapularis: 5/5     Other   Erythema: absent  Scars: present  Sensation: normal  Pulse: present     Comments:  Normal sensation and motor function in axillary, ulnar, median and radial nerve distributions.             Physical Exam  Vitals reviewed.   Constitutional:       Appearance: He is well-developed.   HENT:      Head: Normocephalic and atraumatic.   Eyes:      General:         Right eye: No discharge.         Left eye: No discharge.      Conjunctiva/sclera: Conjunctivae normal.   Cardiovascular:      Rate and Rhythm: Regular rhythm.   Pulmonary:      Effort: Pulmonary effort is normal. No respiratory distress.   Musculoskeletal:      Cervical back: Normal range of motion and neck supple.      Comments: As noted in the HPI.   Skin:     General: Skin is warm and dry.   Neurological:      Mental Status: He is alert and oriented to person, place, and time.   Psychiatric:         Behavior: Behavior normal.        _____________________________________________________  STUDIES REVIEWED:  I personally reviewed the pertinent images and my independent interpretation is as follows:  No images      PROCEDURES PERFORMED:  No Procedures performed today    Scribe Attestation      I,:  Guilherme Cortez am acting as a scribe while in the presence of the attending physician.:       I,:  Clint  Keshav Suarez MD personally performed the services described in this documentation    as scribed in my presence.:

## 2025-04-29 DIAGNOSIS — I25.10 CORONARY ARTERY DISEASE INVOLVING NATIVE CORONARY ARTERY OF NATIVE HEART WITHOUT ANGINA PECTORIS: ICD-10-CM

## 2025-04-30 RX ORDER — CLOPIDOGREL BISULFATE 75 MG/1
75 TABLET ORAL
Qty: 90 TABLET | Refills: 0 | Status: SHIPPED | OUTPATIENT
Start: 2025-04-30

## 2025-04-30 NOTE — TELEPHONE ENCOUNTER
Patient needs updated blood work. Please place orders. A courtesy refill was provided.   HCT in normal range and within 180 days   HGB in normal range and within 180 days   PLT in normal range and within 180 days    Patient has an appointment 05/01/25  
no

## 2025-05-01 ENCOUNTER — TELEPHONE (OUTPATIENT)
Dept: CARDIOLOGY CLINIC | Facility: CLINIC | Age: 75
End: 2025-05-01

## 2025-05-01 ENCOUNTER — OFFICE VISIT (OUTPATIENT)
Dept: CARDIOLOGY CLINIC | Facility: CLINIC | Age: 75
End: 2025-05-01
Payer: COMMERCIAL

## 2025-05-01 VITALS
HEART RATE: 62 BPM | SYSTOLIC BLOOD PRESSURE: 128 MMHG | BODY MASS INDEX: 27.4 KG/M2 | HEIGHT: 69 IN | DIASTOLIC BLOOD PRESSURE: 80 MMHG | WEIGHT: 185 LBS | OXYGEN SATURATION: 96 %

## 2025-05-01 DIAGNOSIS — R01.1 CARDIAC MURMUR: ICD-10-CM

## 2025-05-01 DIAGNOSIS — R73.09 IMPAIRED GLUCOSE METABOLISM: ICD-10-CM

## 2025-05-01 DIAGNOSIS — I25.10 CORONARY ARTERY DISEASE INVOLVING NATIVE CORONARY ARTERY OF NATIVE HEART WITHOUT ANGINA PECTORIS: ICD-10-CM

## 2025-05-01 DIAGNOSIS — I11.9 HYPERTENSIVE HEART DISEASE WITHOUT HEART FAILURE: ICD-10-CM

## 2025-05-01 DIAGNOSIS — E78.5 DYSLIPIDEMIA: ICD-10-CM

## 2025-05-01 DIAGNOSIS — Z98.61 HISTORY OF PERCUTANEOUS CORONARY INTERVENTION: ICD-10-CM

## 2025-05-01 DIAGNOSIS — R09.89 RIGHT CAROTID BRUIT: ICD-10-CM

## 2025-05-01 PROCEDURE — 99214 OFFICE O/P EST MOD 30 MIN: CPT | Performed by: INTERNAL MEDICINE

## 2025-05-01 PROCEDURE — 93000 ELECTROCARDIOGRAM COMPLETE: CPT | Performed by: INTERNAL MEDICINE

## 2025-05-01 NOTE — PROGRESS NOTES
Progress note - Cardiology Office  [facility].    Santiago Rogers 75 y.o. male MRN: [id number]  : 1950  Unit/Bed#:  Encounter: [id number]      Assessment:     1. Coronary artery disease involving native coronary artery of native heart without angina pectoris    2. Hypertensive heart disease without heart failure    3. Cardiac murmur    4. Dyslipidemia    5. History of percutaneous coronary intervention    6. Impaired glucose metabolism    7. Right carotid bruit        Discussion summary and Plan:     1. Coronary artery disease.  Patient had history of coronary artery disease with stenting done of right coronary artery in .  Does not remember about other arteries.  His exercise stress test he could not get the target heart rate he did a nuclear stress test 2021 which was nonischemic with normal LV systolic function.  He has no change in symptoms.  Encouraged him to do at least 2 to 3 miles a day.  His triglyceride was elevated in  he has not had any cholesterol testing in  we took the liberty of ordering routine blood test of CMP CBC lipid profile and TSH.      2.  Hypertensive heart disease without heart failure.  Blood pressure has been well-controlled since on amlodipine.  Electrolyte ordered.      3. Dyslipidemia.  On statins.  Continue Crestor 20 mg LDL was 87.  Renew statins LFTs and cholesterol profile acceptable in 2023.  Repeat blood test ordered     4.  Cardiac murmur.  Echo Doppler shows normal LV systolic function mild thickened aortic valve no significant other disease noted.  No change in clinical exam.    5. History of erectile dysfunction.  On Cialis.  Patient also with a prescription for nitro.  Advised him not to take nitro if he has taken Cialis he understand it very well that it can lead to interaction and decreasing blood pressure.    6.  Impaired glucose metabolism.  Check HbA1c    7.  Right carotid bruit with known right carotid ICA has 70%  plus stenosis and both external carotid stenosis disease on the right and left.  He follows with vascular.    Plan.     Rx follow-up  6 to 9 months.      Patient  was advised and educated to call our office  immediately or go to the nearest hospital if  patient has any new symptoms of chest pain/shortness of breath, near-syncope, syncope, light headedness sustained palpitations  or any other cardiovascular symptoms before their scheduled follow-up appointment.  Office phone number was provided #[phone].    Thank you for your consultation.  If you have any question please call me at [phone]    Counseling :  A description of the counseling.  Goals and Barriers.  Patient's ability to self care: Yes  Medication side effect reviewed with patient in detail and all their questions answered to their satisfaction.      Primary Care Physician : Isaac Shannon DO      HPI :     Santiago Rogers is a 75 y.o. year old male who was referred by primary care doctor for coronary artery disease.  Patient who has medical history significant for coronary artery disease status post angioplasty of  Right coronary artery, hypertension, dyslipidemia who used to follow at [facility] now came to follow follow-up with us for his cardiovascular issues.  Patient has not seen a cardiologist since 2019. He used to follow up with [facility].  He had a stenting done of his RCA in 2009. It was done for the symptoms of exertional shortness of breath and he underwent cardiac catheterization found to have RCA stenosis.    He had a strong family history of heart problem multiple family members have history of coronary artery disease and has history of bypass surgery.  He also himself has impaired glucose metabolism.    He has no recent surgical history recently he was found to have thyroid nodule which is followed by the medical MD.     He is  he lives with his wife.  He does not smoke.  Used to smoke quit 20  years ago      5/31/2023.    Above reviewed.  Patient came for follow-up.  He had history of coronary artery disease s/p angioplasty of RCA in 2009, strong family Siv heart disease with multiple family member having coronary artery disease, dyslipidemia, erectile dysfunction on Cialis who came for follow-up.  He had a nuclear stress test done in 2021 which was nonischemic.  EKG today shows sinus them heart rate 63 bpm which is not changed.  He simply had blood pressure was found to be elevated and primary care team increase his amlodipine to 10 mg which seem to be working better.  His blood pressure has improved.  His blood test done in March 2023 which shows his electrolyte and Cresta profiles are significantly improved and better.  Slightly elevated triglyceride levels noted.  No other issues at this time.    1/16/2024.    Above reviewed.  Patient came for follow-up.  Had history of coronary artery disease to respond history of artery in 2009, strong family heart disease, dyslipidemia, erectile dysfunction on Cialis who came for follow-up.  He has a nuclear stress was done in 2021 which shows no ischemia and he has normal LV systolic function at that time.  His blood test from March 2023 as well as December 2023 reviewed.  LFTs and cholesterol profile is acceptable vitals are stable.  He is currently taking Plavix, amlodipine 10 mg daily Crestor 20 and metoprolol 50 mg twice a day.  He has no new symptoms he feels well.  His EKG shows no change from previous EKG.    5/1/2025.    Above reviewed.  Patient came for follow-up.  He has a medical history significant for coronary artery disease s/p angioplasty in 2009, family history of heart disease, dyslipidemia, erectile dysfunction on Cialis who came for follow-up.  His blood test from 12/2/2024 shows his electrolyte were acceptable.  His last cholesterol testing in [facility] was 3/16/2023 with LDL of 91 triglyceride elevated.  He had a history of bilateral  carotid bruit and his carotid Doppler shows bilateral external carotid stenosis.  His cardiac stress test was in September 2021.  His med list reviewed.  His carotid Doppler shows moderate carotid stenosis and right carotid has a known 70% R ICA stenosis he follows with vascular.  He has shoulder dislocation after a fall in August 2024 now recovering well.    Review of Systems   Constitutional:  Negative for activity change, chills, diaphoresis, fever and unexpected weight change.   HENT:  Negative for congestion.    Eyes:  Negative for discharge and redness.   Respiratory:  Negative for cough, chest tightness, shortness of breath and wheezing.    Cardiovascular: Negative.  Negative for chest pain, palpitations and leg swelling.   Gastrointestinal:  Negative for abdominal pain, diarrhea and nausea.   Endocrine: Negative.    Genitourinary:  Negative for decreased urine volume and urgency.   Musculoskeletal: Negative.  Negative for arthralgias, back pain and gait problem.   Skin:  Negative for rash and wound.   Allergic/Immunologic: Negative.    Neurological:  Negative for dizziness, seizures, syncope, weakness, light-headedness and headaches.   Hematological: Negative.    Psychiatric/Behavioral:  Negative for agitation and confusion. The patient is not nervous/anxious.        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)     16jan2009  last assessed   • Fracture     right lower leg   • Hyperlipidemia    • Hypertension    • Skin neoplasm     27ovt2016 resolved   • Wears glasses      Past Surgical History:   Procedure Laterality Date   • ANGIOPLASTY     • COLONOSCOPY     • COLONOSCOPY N/A 9/10/2018    Procedure: COLONOSCOPY;  Surgeon: Prem Friedman MD;  Location: [facility];  Service: Gastroenterology   • CORONARY  ANGIOPLASTY WITH STENT PLACEMENT      rca stent   • FRACTURE SURGERY Right     tibia,fibula fx repair   • NH SURGICAL ARTHROSCOPY SHOULDER W/ROTATOR CUFF RPR Left 10/30/2024    Procedure: Left shoulder rotator cuff repair, biceps tenotomy;  Surgeon: Clint Suarez MD;  Location: [facility];  Service: Orthopedics   • TONSILLECTOMY      age 6   • US GUIDED THYROID BIOPSY  2019     Social History     Substance and Sexual Activity   Alcohol Use Yes    Comment: occ     Social History     Substance and Sexual Activity   Drug Use No     Social History     Tobacco Use   Smoking Status Former   • Current packs/day: 0.00   • Average packs/day: 1 pack/day for 20.0 years (20.0 ttl pk-yrs)   • Types: Cigarettes   • Start date: 6/15/1966   • Quit date: 6/15/1986   • Years since quittin.9   • Passive exposure: Past   Smokeless Tobacco Never     Family History:   Family History   Problem Relation Age of Onset   • Coronary artery disease Father    • Heart disease Father    • Hypertension Father    • Macular degeneration Father    • Diabetes Sister    • Heart disease Brother         CABG age 46,       Meds/Allergies     Allergies   Allergen Reactions   • Lisinopril Cough       Current Outpatient Medications:   •  acetaminophen (TYLENOL) 500 mg tablet, Take 2 tablets (1,000 mg total) by mouth every 6 (six) hours as needed for mild pain, Disp: 60 tablet, Rfl: 0  •  amLODIPine-valsartan (EXFORGE) 5-160 MG per tablet, Take 1 tablet by mouth daily, Disp: 180 tablet, Rfl: 1  •  aspirin (ECOTRIN LOW STRENGTH) 81 mg EC tablet, Take 1 tablet (81 mg total) by mouth daily, Disp: 30 tablet, Rfl: 0  •  carvedilol (COREG) 12.5 mg tablet, TAKE ONE TABLET BY MOUTH TWICE A DAY WITH MEALS (GENERIC FOR COREG), Disp: 60 tablet, Rfl: 2  •  clopidogrel (PLAVIX) 75 mg tablet, TAKE 1 TABLET BY MOUTH DAILY AT  BEDTIME, Disp: 90 tablet, Rfl: 0  •  erythromycin (ILOTYCIN) ophthalmic ointment, PRN, Disp: , Rfl:   •  nitroglycerin  "(Nitrostat) 0.4 mg SL tablet, Place 1 tablet (0.4 mg total) under the tongue every 5 (five) minutes as needed for chest pain, Disp: 25 tablet, Rfl: 0  •  rosuvastatin (CRESTOR) 20 MG tablet, TAKE 1 TABLET BY MOUTH DAILY AT  BEDTIME, Disp: 90 tablet, Rfl: 3  •  tadalafil (Cialis) 20 MG tablet, Take 1 tablet (20 mg total) by mouth daily as needed for erectile dysfunction, Disp: 30 tablet, Rfl: 5  •  triamcinolone (KENALOG) 0.1 % ointment, APPLY TOPICALLY TWO TIMES A DAY TO HANDS (SHORT TERM USE) (GENERIC FOR KENALOG), Disp: 80 g, Rfl: 5  •  Vitamin D, Ergocalciferol, 10063 units CAPS, Take 50,000 Units by mouth once a week (Patient not taking: Reported on 3/3/2025), Disp: 8 capsule, Rfl: 0    Vitals: Blood pressure 128/80, pulse 62, height 5' 8.5\" (1.74 m), weight 83.9 kg (185 lb), SpO2 96%.    Body mass index is 27.72 kg/m².  Wt Readings from Last 3 Encounters:   05/01/25 83.9 kg (185 lb)   04/28/25 84.4 kg (186 lb)   03/03/25 83.9 kg (185 lb)     Vitals:    05/01/25 1135   Weight: 83.9 kg (185 lb)       BP Readings from Last 3 Encounters:   05/01/25 128/80   12/17/24 160/70   12/09/24 159/83         Physical Exam  Constitutional:       General: He is not in acute distress.     Appearance: He is well-developed. He is not diaphoretic.   Neck:      Thyroid: No thyromegaly.      Vascular: No JVD.      Trachea: No tracheal deviation.   Cardiovascular:      Rate and Rhythm: Normal rate and regular rhythm.      Heart sounds: S1 normal and S2 normal. Heart sounds not distant. Murmur heard.      Systolic (ejection) murmur is present with a grade of 2/6.      No friction rub. No gallop. No S3 or S4 sounds.   Pulmonary:      Effort: Pulmonary effort is normal. No respiratory distress.      Breath sounds: No wheezing or rales.      Comments: Bilateral air entry coarse breath sounds.  Chest:      Chest wall: No tenderness.   Abdominal:      General: Bowel sounds are normal. There is no distension.      Palpations: Abdomen is " soft.      Tenderness: There is no abdominal tenderness.   Musculoskeletal:         General: No deformity.      Cervical back: Neck supple.   Skin:     General: Skin is warm and dry.      Coloration: Skin is not pale.      Findings: No rash.   Neurological:      Mental Status: He is alert and oriented to person, place, and time.   Psychiatric:         Behavior: Behavior normal.         Judgment: Judgment normal.             Diagnostic Studies Review Cardio:     exercise stress test.  Exercise stress test done 06/18/2021 equivocal.  Patient exercised for 8 minutes achieved workload of 10.1 metabolic equivalents.  It was only 73% maximum predicted heart rate.  And he has mild hypertensive response to exercise.  He was recommended nuclear stress test which he did not due yet.    Nuclear stress test done in September 2021 shows patient's EF is 57%, patient id lower level exercise.  No ischemia was noted      Echo Doppler.  Echo Doppler done 06/18/2020 shows normal LV systolic function.  EF 55 60%, no significant valvular disease.      EKG:  Twelve lead EKG done 04/12/2021 shows sinus rhythm heart rate 55 beats per minute no significant abnormality.    Twelve lead EKG 11/17/2022 shows normal sinus rhythm heart rate 65 beats per minute no change from previous EKG    Twelve-lead EKG 5/31/2023 shows normal sinus some heart rate 63 bpm normal EKG no change from previous EKG.    Twelve-lead EKG done on 1/16/2024 normal sinus some heart rate 57 bpm.  No change from previous EKG and no other significant normality.    Twelve-lead EKG done on 5/1/2025 shows sinus rhythm heart rate 62 bpm no change from previous EKG.    Lab Review   Lab Results   Component Value Date    WBC 7.71 09/17/2024    HGB 13.6 09/17/2024    HCT 41.9 09/17/2024    MCV 88 09/17/2024    RDW 13.6 09/17/2024     09/17/2024     BMP:  Lab Results   Component Value Date    SODIUM 140 12/02/2024    K 4.2 12/02/2024     12/02/2024    CO2 24 12/02/2024  "   BUN 11 12/02/2024    CREATININE 1.01 12/02/2024    GLUC 119 (H) 12/02/2024    CALCIUM 8.8 09/17/2024    EGFR 78 12/02/2024     LFT:  Lab Results   Component Value Date    AST 18 08/21/2024    ALT 18 08/21/2024    ALKPHOS 47 08/21/2024    TP 7.0 08/21/2024    ALB 4.5 08/21/2024        Lab Results   Component Value Date    HGBA1C 6.3 (H) 03/16/2023     Lipid Profile:   Lab Results   Component Value Date    CHOLESTEROL 166 03/16/2023    HDL 48 03/16/2023    LDLCALC 91 03/16/2023    TRIG 157 (H) 03/16/2023     Lab Results   Component Value Date    CHOLESTEROL 166 03/16/2023    CHOLESTEROL 159 03/11/2022         Dr. Dilma Horta MD [facility]      \"This note has been constructed using a voice recognition system.Therefore there may be syntax, spelling, and/or grammatical errors. Please call if you have any questions. \"  "

## 2025-05-08 ENCOUNTER — FOLLOW UP (OUTPATIENT)
Dept: URBAN - METROPOLITAN AREA CLINIC 6 | Facility: CLINIC | Age: 75
End: 2025-05-08

## 2025-05-08 DIAGNOSIS — H04.123: ICD-10-CM

## 2025-05-08 DIAGNOSIS — H40.1131: ICD-10-CM

## 2025-05-08 PROCEDURE — 92012 INTRM OPH EXAM EST PATIENT: CPT

## 2025-05-08 PROCEDURE — 92083 EXTENDED VISUAL FIELD XM: CPT

## 2025-05-08 ASSESSMENT — TONOMETRY
OS_IOP_MMHG: 15
OD_IOP_MMHG: 21

## 2025-05-08 ASSESSMENT — VISUAL ACUITY
OU_CC: J1
OS_CC: 20/20
OD_CC: 20/20

## 2025-05-08 NOTE — PROGRESS NOTES
Name: Santiago Rogers      : 1950      MRN: [id number]  Encounter Provider: Wicho Mosqueda DO  Encounter Date: 2025   Enc[facility]  :  Assessment & Plan  Asymptomatic bilateral carotid artery stenosis  75-year-old male presents for follow-up for carotid stenosis.  He was seen last year in our practice for asymptomatic carotid disease.  And at that time was managed medically I reviewed his last duplex as well as his most recent duplex with him he has a severe right asymptomatic ICA stenosis.  And a mild lesion on the left.  He has no neurologic symptoms that are associated with this he does have glaucoma that is being treated but he has no visual symptoms consistent with TIA or any focal neurologic symptoms.  He has had no prior neck surgery or radiation.  He is a former smoker he is compliant with aspirin and statin.  His velocities on the right side the peak systolic and end-diastolic are both in the severe range however the ratio was less than 4 at this point.  At this time I think overall looking at his data on his duplex is stenosis is likely approximately 70% I explained to him that for good risk surgical patients with long life expectancy we would generally recommend intervention at a degree of 80% stenosis.  At this time I recommended continued medical management and surveillance we will plan to repeat his duplex in 6-month and he can see me back in the office in 1 year he is aware that he is getting close to the threshold to consider surgical intervention for his right carotid.    Orders:    VAS carotid complete study; Future        History of Present Illness     Patient presents today to review carotid duplex done 25. Reports cloudy/blurry vision in R eye on occasion.     HPI  Santiago Rogers is a 75 y.o. male   History obtained from: patient    Review of Systems   Constitutional: Negative.    HENT: Negative.     Eyes: Negative.    Respiratory: Negative.    "  Cardiovascular: Negative.    Gastrointestinal: Negative.    Endocrine: Negative.    Genitourinary: Negative.    Musculoskeletal: Negative.    Skin: Negative.    Allergic/Immunologic: Negative.    Neurological: Negative.    Hematological: Negative.    Psychiatric/Behavioral: Negative.       I have reviewed the ROS above and made changes as needed.         Objective   /68 (BP Location: Right arm, Patient Position: Sitting)   Pulse 56   Ht 5' 8.5\" (1.74 m)   Wt 83.5 kg (184 lb)   BMI 27.57 kg/m²      Physical Exam    General  Exam: alert, awake, oriented, no distress, consistent with stated age    Integumentary  Exam: warm, dry, no gross lesions, no bruises and normal color    Head and Neck  Exam: supple, no bruits, trachea midline, no JVD, no mass or palpable nodes    Chest and Lung  Exam: chest normal without deformity, bilaterally expansive, clear to auscultation    Cardiovascular  Exam: regular rate, regular rhythm, no murmurs, no rubs or gallops    Adbomen  Exam: soft, non-tender, non-distended, no pulsatile abdominal masses, no abdominal bruit      Neurologic  Exam:alert, non-focal, oriented x 3      Administrative Statements   I have spent a total time of 30 minutes in caring for this patient on the day of the visit/encounter including Counseling / Coordination of care, Documenting in the medical record, and Reviewing/placing orders in the medical record (including tests, medications, and/or procedures).  "

## 2025-05-09 ENCOUNTER — OFFICE VISIT (OUTPATIENT)
Dept: VASCULAR SURGERY | Facility: CLINIC | Age: 75
End: 2025-05-09
Payer: COMMERCIAL

## 2025-05-09 VITALS
HEIGHT: 69 IN | HEART RATE: 56 BPM | WEIGHT: 184 LBS | BODY MASS INDEX: 27.25 KG/M2 | DIASTOLIC BLOOD PRESSURE: 68 MMHG | SYSTOLIC BLOOD PRESSURE: 128 MMHG

## 2025-05-09 DIAGNOSIS — I65.23 ASYMPTOMATIC BILATERAL CAROTID ARTERY STENOSIS: Primary | ICD-10-CM

## 2025-05-09 PROBLEM — I65.21 STENOSIS OF RIGHT CAROTID ARTERY GREATER THAN 50%: Status: RESOLVED | Noted: 2024-10-22 | Resolved: 2025-05-09

## 2025-05-09 PROBLEM — I65.29 CAROTID STENOSIS, ASYMPTOMATIC: Status: ACTIVE | Noted: 2025-05-09

## 2025-05-09 PROCEDURE — 99215 OFFICE O/P EST HI 40 MIN: CPT | Performed by: SURGERY

## 2025-05-09 NOTE — ASSESSMENT & PLAN NOTE
75-year-old male presents for follow-up for carotid stenosis.  He was seen last year in our practice for asymptomatic carotid disease.  And at that time was managed medically I reviewed his last duplex as well as his most recent duplex with him he has a severe right asymptomatic ICA stenosis.  And a mild lesion on the left.  He has no neurologic symptoms that are associated with this he does have glaucoma that is being treated but he has no visual symptoms consistent with TIA or any focal neurologic symptoms.  He has had no prior neck surgery or radiation.  He is a former smoker he is compliant with aspirin and statin.  His velocities on the right side the peak systolic and end-diastolic are both in the severe range however the ratio was less than 4 at this point.  At this time I think overall looking at his data on his duplex is stenosis is likely approximately 70% I explained to him that for good risk surgical patients with long life expectancy we would generally recommend intervention at a degree of 80% stenosis.  At this time I recommended continued medical management and surveillance we will plan to repeat his duplex in 6-month and he can see me back in the office in 1 year he is aware that he is getting close to the threshold to consider surgical intervention for his right carotid.    Orders:    VAS carotid complete study; Future

## 2025-05-13 DIAGNOSIS — I10 BENIGN ESSENTIAL HYPERTENSION: ICD-10-CM

## 2025-05-13 LAB
ALBUMIN SERPL-MCNC: 4.5 G/DL (ref 3.8–4.8)
ALP SERPL-CCNC: 56 IU/L (ref 44–121)
ALT SERPL-CCNC: 14 IU/L (ref 0–44)
AST SERPL-CCNC: 16 IU/L (ref 0–40)
BASOPHILS # BLD AUTO: 0.1 X10E3/UL (ref 0–0.2)
BASOPHILS NFR BLD AUTO: 2 %
BILIRUB SERPL-MCNC: 0.5 MG/DL (ref 0–1.2)
BUN SERPL-MCNC: 13 MG/DL (ref 8–27)
BUN/CREAT SERPL: 12 (ref 10–24)
CALCIUM SERPL-MCNC: 9.2 MG/DL (ref 8.6–10.2)
CHLORIDE SERPL-SCNC: 102 MMOL/L (ref 96–106)
CHOLEST SERPL-MCNC: 157 MG/DL (ref 100–199)
CHOLEST/HDLC SERPL: 3.5 RATIO (ref 0–5)
CO2 SERPL-SCNC: 23 MMOL/L (ref 20–29)
CREAT SERPL-MCNC: 1.08 MG/DL (ref 0.76–1.27)
EGFR: 72 ML/MIN/1.73
EOSINOPHIL # BLD AUTO: 0.8 X10E3/UL (ref 0–0.4)
EOSINOPHIL NFR BLD AUTO: 12 %
ERYTHROCYTE [DISTWIDTH] IN BLOOD BY AUTOMATED COUNT: 13.7 % (ref 11.6–15.4)
EST. AVERAGE GLUCOSE BLD GHB EST-MCNC: 140 MG/DL
GLOBULIN SER-MCNC: 1.9 G/DL (ref 1.5–4.5)
GLUCOSE SERPL-MCNC: 115 MG/DL (ref 70–99)
HBA1C MFR BLD: 6.5 % (ref 4.8–5.6)
HCT VFR BLD AUTO: 42.5 % (ref 37.5–51)
HDLC SERPL-MCNC: 45 MG/DL
HGB BLD-MCNC: 14.2 G/DL (ref 13–17.7)
IMM GRANULOCYTES # BLD: 0 X10E3/UL (ref 0–0.1)
IMM GRANULOCYTES NFR BLD: 0 %
LDLC SERPL CALC-MCNC: 88 MG/DL (ref 0–99)
LYMPHOCYTES # BLD AUTO: 2.2 X10E3/UL (ref 0.7–3.1)
LYMPHOCYTES NFR BLD AUTO: 33 %
MCH RBC QN AUTO: 28.4 PG (ref 26.6–33)
MCHC RBC AUTO-ENTMCNC: 33.4 G/DL (ref 31.5–35.7)
MCV RBC AUTO: 85 FL (ref 79–97)
MONOCYTES # BLD AUTO: 0.5 X10E3/UL (ref 0.1–0.9)
MONOCYTES NFR BLD AUTO: 7 %
NEUTROPHILS # BLD AUTO: 3.1 X10E3/UL (ref 1.4–7)
NEUTROPHILS NFR BLD AUTO: 46 %
PLATELET # BLD AUTO: 234 X10E3/UL (ref 150–450)
POTASSIUM SERPL-SCNC: 4.6 MMOL/L (ref 3.5–5.2)
PROT SERPL-MCNC: 6.4 G/DL (ref 6–8.5)
RBC # BLD AUTO: 5 X10E6/UL (ref 4.14–5.8)
SL AMB VLDL CHOLESTEROL CALC: 24 MG/DL (ref 5–40)
SODIUM SERPL-SCNC: 135 MMOL/L (ref 134–144)
TRIGL SERPL-MCNC: 139 MG/DL (ref 0–149)
TSH SERPL DL<=0.005 MIU/L-ACNC: 3.48 UIU/ML (ref 0.45–4.5)
WBC # BLD AUTO: 6.7 X10E3/UL (ref 3.4–10.8)

## 2025-05-13 RX ORDER — CARVEDILOL 12.5 MG/1
TABLET ORAL
Qty: 180 TABLET | Refills: 1 | Status: SHIPPED | OUTPATIENT
Start: 2025-05-13

## 2025-05-13 RX ORDER — AMLODIPINE AND VALSARTAN 5; 160 MG/1; MG/1
1 TABLET ORAL DAILY
Qty: 14 TABLET | Refills: 0 | Status: SHIPPED | OUTPATIENT
Start: 2025-05-13

## 2025-05-13 RX ORDER — AMLODIPINE AND VALSARTAN 5; 160 MG/1; MG/1
1 TABLET ORAL DAILY
Qty: 90 TABLET | Refills: 1 | Status: SHIPPED | OUTPATIENT
Start: 2025-05-13

## 2025-05-13 RX ORDER — CARVEDILOL 12.5 MG/1
TABLET ORAL
Qty: 28 TABLET | Refills: 0 | Status: SHIPPED | OUTPATIENT
Start: 2025-05-13

## 2025-05-13 NOTE — TELEPHONE ENCOUNTER
Mail order needs new prescriptions with 14 day emergency supply to retail pharmacy     Reason for call:   [x] Refill   [] Prior Auth  [] Other:     Office:   [] PCP/Provider -   [x] Specialty/Provider - Dilma Jack    Medication: Amlodipine-Valsartan   Dose/Frequency: 5-160 mg Daily   Quantity: 90    Medication: Carvedilol   Dose/Frequency: 12.5 mg BID  Quantity: 180    Pharmacy: [facility] [address]    Medication: Amlodipine-Valsartan   Dose/Frequency: 5-160 mg   Quantity: 14    Medication: Carvedilol   Dose/Frequency: 12.5 mg BID  Quantity: 28    Pharmacy: [place] [id number]    Local Pharmacy   Does the patient have enough for 3 days?   [] Yes   [x] No - Send as HP to POD    Mail Away Pharmacy   Does the patient have enough for 10 days?   [] Yes   [x] No - Send as HP to POD

## 2025-05-14 ENCOUNTER — RESULTS FOLLOW-UP (OUTPATIENT)
Dept: CARDIOLOGY CLINIC | Facility: CLINIC | Age: 75
End: 2025-05-14

## 2025-05-14 NOTE — TELEPHONE ENCOUNTER
I spoke with patient, made aware of results.   He had inquired about his A1C ; he will be discussing further with Dr. Shannon Monday.

## 2025-05-14 NOTE — TELEPHONE ENCOUNTER
----- Message from NOAM Paz sent at 5/14/2025  3:06 PM EDT -----  Please let patient know that his labs look very good. Lipid panel is excellent. Continue current medication, diet and exercise.  ----- Message -----  From: [facility] Results In  Sent: 5/13/2025   7:35 AM EDT  To: Dilma Horta MD

## 2025-05-19 ENCOUNTER — OFFICE VISIT (OUTPATIENT)
Dept: FAMILY MEDICINE CLINIC | Facility: CLINIC | Age: 75
End: 2025-05-19
Payer: COMMERCIAL

## 2025-05-19 VITALS
TEMPERATURE: 98.5 F | HEART RATE: 57 BPM | BODY MASS INDEX: 27.31 KG/M2 | RESPIRATION RATE: 16 BRPM | OXYGEN SATURATION: 98 % | DIASTOLIC BLOOD PRESSURE: 70 MMHG | HEIGHT: 69 IN | WEIGHT: 184.4 LBS | SYSTOLIC BLOOD PRESSURE: 138 MMHG

## 2025-05-19 DIAGNOSIS — N52.9 ORGANIC IMPOTENCE: ICD-10-CM

## 2025-05-19 DIAGNOSIS — I25.10 CORONARY ARTERY DISEASE INVOLVING NATIVE CORONARY ARTERY OF NATIVE HEART WITHOUT ANGINA PECTORIS: ICD-10-CM

## 2025-05-19 DIAGNOSIS — I10 BENIGN ESSENTIAL HYPERTENSION: Primary | ICD-10-CM

## 2025-05-19 DIAGNOSIS — R73.03 PREDIABETES: ICD-10-CM

## 2025-05-19 DIAGNOSIS — N42.9 DISORDER OF PROSTATE, UNSPECIFIED: ICD-10-CM

## 2025-05-19 DIAGNOSIS — Z12.5 ENCOUNTER FOR SCREENING FOR MALIGNANT NEOPLASM OF PROSTATE: ICD-10-CM

## 2025-05-19 PROCEDURE — 99214 OFFICE O/P EST MOD 30 MIN: CPT | Performed by: FAMILY MEDICINE

## 2025-05-19 PROCEDURE — G2211 COMPLEX E/M VISIT ADD ON: HCPCS | Performed by: FAMILY MEDICINE

## 2025-05-19 RX ORDER — TADALAFIL 20 MG/1
20 TABLET ORAL DAILY PRN
Qty: 30 TABLET | Refills: 5 | Status: SHIPPED | OUTPATIENT
Start: 2025-05-19

## 2025-05-19 NOTE — PROGRESS NOTES
Name: Santiago Rogers      : 1950      MRN: [id number]  Encounter Provider: Isaac Shannon DO  Encounter Date: 2025   Encounter department: [facility]  :  Assessment & Plan  Organic impotence  Stable, use aware  Orders:    tadalafil (Cialis) 20 MG tablet; Take 1 tablet (20 mg total) by mouth daily as needed for erectile dysfunction    Benign essential hypertension  Stable on meds       Coronary artery disease involving native coronary artery of native heart without angina pectoris  Stable, f/u cardiology       Encounter for screening for malignant neoplasm of prostate  yearly  Orders:    PSA Total (Reflex To Free); Future    Disorder of prostate, unspecified    Orders:    PSA Total (Reflex To Free); Future    Prediabetes  Watch carbs  Orders:    Comprehensive metabolic panel; Future    Hemoglobin A1C; Future           History of Present Illness   HPI  Some wt gain, 5# gain  Less exercise with shoulder issue  Diet is bad per pt  Diet aware  Can improve  A1c 6.5  Cialis tolerated  4 blocks, 2 stairs ok      Review of Systems   Constitutional:  Negative for chills and fever.     Family History   Problem Relation Age of Onset    Coronary artery disease Father     Heart disease Father     Hypertension Father     Macular degeneration Father     Diabetes Sister     Heart disease Brother         CABG age 46,      Social History[1]   Current Outpatient Medications   Medication Instructions    acetaminophen (TYLENOL) 1,000 mg, Oral, Every 6 hours PRN    amLODIPine-valsartan (EXFORGE) 5-160 MG per tablet 1 tablet, Oral, Daily    amLODIPine-valsartan (EXFORGE) 5-160 MG per tablet 1 tablet, Oral, Daily    aspirin (ECOTRIN LOW STRENGTH) 81 mg, Oral, Daily    bimatoprost (LUMIGAN) 0.01 % ophthalmic drops 1 drop, Daily at bedtime    carvedilol (COREG) 12.5 mg tablet TAKE ONE TABLET BY MOUTH TWICE A DAY WITH MEALS (GENERIC FOR COREG) Strength: 12.5 mg    carvedilol (COREG) 12.5 mg tablet TAKE ONE  "TABLET BY MOUTH TWICE A DAY WITH MEALS (GENERIC FOR COREG)    clopidogrel (PLAVIX) 75 mg, Oral, Daily at bedtime    erythromycin (ILOTYCIN) ophthalmic ointment PRN    nitroglycerin (NITROSTAT) 0.4 mg, Sublingual, Every 5 minutes PRN    rosuvastatin (CRESTOR) 20 mg, Oral, Daily at bedtime    tadalafil (CIALIS) 20 mg, Oral, Daily PRN    triamcinolone (KENALOG) 0.1 % ointment APPLY TOPICALLY TWO TIMES A DAY TO HANDS (SHORT TERM USE) (GENERIC FOR KENALOG)     >>>>>>>>  No follow-ups on file.  >>>>>>>>    [POCT]  No results found for this or any previous visit (from the past 24 hours).    Visit Vitals  /70   Pulse 57   Temp 98.5 °F (36.9 °C)   Resp 16   Ht 5' 8.5\" (1.74 m)   Wt 83.6 kg (184 lb 6.4 oz)   SpO2 98%   BMI 27.63 kg/m²   Smoking Status Former   BSA 1.98 m²        Objective   /70   Pulse 57   Temp 98.5 °F (36.9 °C)   Resp 16   Ht 5' 8.5\" (1.74 m)   Wt 83.6 kg (184 lb 6.4 oz)   SpO2 98%   BMI 27.63 kg/m²      Physical Exam  Vitals and nursing note reviewed.   Constitutional:       General: He is not in acute distress.     Appearance: He is well-developed. He is not ill-appearing.   HENT:      Head: Normocephalic.      Right Ear: Ear canal and external ear normal.      Left Ear: Ear canal and external ear normal.      Mouth/Throat:      Mouth: Mucous membranes are moist.     Eyes:      Conjunctiva/sclera: Conjunctivae normal.       Cardiovascular:      Rate and Rhythm: Normal rate and regular rhythm.   Pulmonary:      Effort: Pulmonary effort is normal. No respiratory distress.      Breath sounds: No wheezing.   Abdominal:      General: There is no distension.      Palpations: Abdomen is soft.     Musculoskeletal:         General: No deformity.      Cervical back: Neck supple.      Right lower leg: No edema.      Left lower leg: No edema.     Skin:     General: Skin is warm and dry.      Coloration: Skin is not jaundiced or pale.     Neurological:      Mental Status: He is alert.      Motor: No " weakness.      Gait: Gait normal.     Psychiatric:         Behavior: Behavior normal.         Thought Content: Thought content normal.                [1]   Social History  Tobacco Use    Smoking status: Former     Current packs/day: 0.00     Average packs/day: 1 pack/day for 20.0 years (20.0 ttl pk-yrs)     Types: Cigarettes     Start date: 6/15/1966     Quit date: 6/15/1986     Years since quittin.9     Passive exposure: Past    Smokeless tobacco: Never   Vaping Use    Vaping status: Never Used   Substance Use Topics    Alcohol use: Yes     Comment: occ    Drug use: No

## 2025-05-19 NOTE — ASSESSMENT & PLAN NOTE
Stable, use aware  Orders:    tadalafil (Cialis) 20 MG tablet; Take 1 tablet (20 mg total) by mouth daily as needed for erectile dysfunction

## 2025-05-22 NOTE — TELEPHONE ENCOUNTER
Patient said he has not received his 90-day orders for carvedilol and amlodipine-valsartan, sent to [facility] 05/13/25. He was advised that they may be on hold if the short supplies that were sent to [facility] on the same day were run through his insurance. Patient could not confirm if he paid out of pocket for the short supplies. He agreed to call [facility] to find out what is causing the delay of his shipment. Patient said he would call back if he needs further assistance with these refills.

## 2025-06-30 ENCOUNTER — NURSE TRIAGE (OUTPATIENT)
Age: 75
End: 2025-06-30

## 2025-06-30 NOTE — TELEPHONE ENCOUNTER
Regarding: Low BP and lightheadedness  ----- Message from [facility] sent at 6/30/2025 11:38 AM EDT -----  Received call from patient regarding his BP being low over the past 2 weeks, with the diastolic ranging around 50-60. He reports lightheadedness and vision concerns in his R eye. He does take Lumigan eye drops for glaucoma.

## 2025-06-30 NOTE — TELEPHONE ENCOUNTER
"REASON FOR CONVERSATION: Hypotension    SYMPTOMS: Patient believes BP is too low and he has been feeling lightheaded at times.  Diastolic BP has been in 50s and 60s.  -149 .  HR low 60s.  Has a feeling of \"right eye being dilated, colors appear brighter\"  Patient believes meds need to be adjusted.    OTHER HEALTH INFORMATION: Right eye glaucoma.  Patient said he has discussed recent eye symptom with his eye doctor.    Taking Coreg 12.5 mg BID  Exforge 5-160 Daily    Patient thinks his meds should be adjusted. He held his AM dose of Coreg today and feels better.  BP now is 158/85 without Coreg.    PROTOCOL DISPOSITION: See Within 3 Days in Office    CARE ADVICE PROVIDED: Continue to monitor BP.  Call [facility] Dr Wilkins to let him know about continued Right eye symptom.     PRACTICE FOLLOW-UP: Please call patient if ok to come in for BP check.  He will bring his home BP cuff with him to compare readings.    I advised NP visit but patient declined and said he would like to know what Dr Horta thinks.      Reason for Disposition   Wants doctor to measure BP    Answer Assessment - Initial Assessment Questions  1. BLOOD PRESSURE: \"What is your blood pressure?\" \"Did you take at least two measurements 5 minutes apart?\"     Diastolic has been 50s 60s for the last two weeks  2. ONSET: \"When did you take your blood pressure?\"      Has been taking the last few days due to feeling lightheaded  3. HOW: \"How did you take your blood pressure?\" (e.g., visiting nurse, automatic home BP monitor)         Upper arm automatic BP cuff  4. HISTORY: \"Do you have a history of low blood pressure?\" \"What is your blood pressure normally?\"      No, history of hypertension  5. MEDICINES: \"Are you taking any medicines for blood pressure?\" If Yes, ask: \"Have they been changed recently?\"      Exforge and Coreg  6. PULSE RATE: \"Do you know what your pulse rate is?\"       HR is 65  7. OTHER SYMPTOMS: \"Have you been sick recently?\" \"Have you " "had a recent injury?\"      Feels lightheaded and has mentioned a vision disturbance    Protocols used: Blood Pressure - Low-Adult-OH    "

## 2025-07-01 NOTE — TELEPHONE ENCOUNTER
Patient returned call. Advised him of recommendations and he expressed understanding and is agreeable to plan with Coreg.

## 2025-07-06 DIAGNOSIS — I25.10 CORONARY ARTERY DISEASE INVOLVING NATIVE CORONARY ARTERY OF NATIVE HEART WITHOUT ANGINA PECTORIS: ICD-10-CM

## 2025-07-08 RX ORDER — CLOPIDOGREL BISULFATE 75 MG/1
75 TABLET ORAL
Qty: 90 TABLET | Refills: 1 | Status: SHIPPED | OUTPATIENT
Start: 2025-07-08

## 2025-08-13 ENCOUNTER — TELEPHONE (OUTPATIENT)
Dept: CARDIOLOGY CLINIC | Facility: CLINIC | Age: 75
End: 2025-08-13

## (undated) DEVICE — FIBERTAK ROTATOR CUFF DISPOSABLES KIT

## (undated) DEVICE — INTENDED FOR TISSUE SEPARATION, AND OTHER PROCEDURES THAT REQUIRE A SHARP SURGICAL BLADE TO PUNCTURE OR CUT.: Brand: BARD-PARKER ® CARBON RIB-BACK BLADES

## (undated) DEVICE — ASTOUND SURGICAL GOWN, XXX LARGE, X-LONG: Brand: CONVERTORS

## (undated) DEVICE — BLADE SHAVER TORPEDO 4MM 13CM  COOLCUT

## (undated) DEVICE — POSITIONER HEAD DISP STRL

## (undated) DEVICE — U-DRAPE: Brand: CONVERTORS

## (undated) DEVICE — PACK ARTHROSCOPY

## (undated) DEVICE — "MH-438 A/W VLVE F/140 EVIS-140": Brand: AIR/WATER VALVE

## (undated) DEVICE — SUT FIBERLINK #2 26IN AR-7235

## (undated) DEVICE — GLOVE SRG BIOGEL ECLIPSE 8

## (undated) DEVICE — CURITY NON-ADHERENT STRIPS: Brand: CURITY

## (undated) DEVICE — TIBURON BEACH CHAIR SHOULDER DRAPE: Brand: CONVERTORS

## (undated) DEVICE — LUBRICANT SURGILUBE TUBE 4 OZ  FLIP TOP

## (undated) DEVICE — TUBING ARTHROSCOPIC WAVE  MAIN PUMP

## (undated) DEVICE — BLADE SHAVER EXCALIBUR 4MM 13CM COOLCUT

## (undated) DEVICE — GLOVE EXAM NON-STRL NTRL PLUS LRG PF

## (undated) DEVICE — NEEDLE SUT SCORPION MEGALOADER

## (undated) DEVICE — DISPOSABLE EQUIPMENT COVER: Brand: LARGE TOWEL DRAPE

## (undated) DEVICE — DRESSING MEPILEX AG BORDER POST-OP 4 X 6 IN

## (undated) DEVICE — NEPTUNE E-SEP SMOKE EVACUATION PENCIL, COATED, 70MM BLADE, PUSH BUTTON SWITCH: Brand: NEPTUNE E-SEP

## (undated) DEVICE — BRUSH ENDO CLEANING DBL-HEADER

## (undated) DEVICE — 60 ML SYRINGE,REGULAR TIP: Brand: MONOJECT

## (undated) DEVICE — "MAJ-901 WATER CONTAINER SET CV-160/140": Brand: WATER CONTAINER

## (undated) DEVICE — 1200CC GUARDIAN II: Brand: GUARDIAN

## (undated) DEVICE — SUTURETAPE 1.3MM W/NEEDLE WHITE/BLUE

## (undated) DEVICE — 3M™ STERI-STRIP™ REINFORCED ADHESIVE SKIN CLOSURES, R1547, 1/2 IN X 4 IN (12 MM X 100 MM), 6 STRIPS/ENVELOPE: Brand: 3M™ STERI-STRIP™

## (undated) DEVICE — "MH-443 SUCTION VALVE F/EVIS140 EVIS160": Brand: SUCTION VALVE

## (undated) DEVICE — AIRLIFE™  ADULT CUSHION NASAL CANNULA WITH 7 FOOT (2.1 M) CRUSH-RESISTANT OXYGEN TUBING, AND U/CONNECT-IT ADAPTER: Brand: AIRLIFE™

## (undated) DEVICE — SUT ETHILON 3-0 PS-1 18 IN 1663G

## (undated) DEVICE — 3M™ IOBAN™ 2 ANTIMICROBIAL INCISE DRAPE 6650EZ: Brand: IOBAN™ 2

## (undated) DEVICE — GLOVE SRG BIOGEL 6.5

## (undated) DEVICE — SP FIBERTAK RC, DBLOAD TAPE BL/W, BLK/W
Type: IMPLANTABLE DEVICE | Site: SHOULDER | Status: NON-FUNCTIONAL
Brand: ARTHREX®

## (undated) DEVICE — PROBE ABLATION  APOLLORF 90 DEG MULTI PORT

## (undated) DEVICE — MEDI-VAC YANKAUER SUCTION HANDLE: Brand: CARDINAL HEALTH

## (undated) DEVICE — TUBING BUBBLE CLEAR 5MM X 100 FT NS

## (undated) DEVICE — TUBING SUCTION 5MM X 12 FT

## (undated) DEVICE — SUT FIBERWIRE #2 1/2 CIRCLE T-5 38IN AR-7200

## (undated) DEVICE — DUAL SPIKE ADAPTER: Brand: CONMED

## (undated) DEVICE — FOOT SWITCH DRAPE: Brand: UNBRANDED

## (undated) DEVICE — ABDOMINAL PAD: Brand: DERMACEA

## (undated) DEVICE — SUT ETHILON 3-0 PS-1 18 IN 1663H

## (undated) DEVICE — BURR  OVAL 4MM 13CM 8 FLUTE COOLCUT

## (undated) DEVICE — GLOVE INDICATOR PI UNDERGLOVE SZ 8 BLUE

## (undated) DEVICE — DISPOSABLE BIOPSY VALVE MAJ-1555: Brand: SINGLE USE BIOPSY VALVE (STERILE)

## (undated) DEVICE — STRETCH BANDAGE: Brand: CURITY

## (undated) DEVICE — CANNULA BUTTON 8 X 30MM PASSPORT

## (undated) DEVICE — CHLORAPREP HI-LITE 26ML ORANGE

## (undated) DEVICE — SOLIDIFIER FLUID WASTE CONTROL 1500ML

## (undated) DEVICE — POSITIONER TRIMANO LIMB BEACH CHAIR

## (undated) DEVICE — DRESSING MEPILEX AG BORDER 3 X 3 IN

## (undated) DEVICE — GAUZE SPONGES,16 PLY: Brand: CURITY

## (undated) DEVICE — GLOVE INDICATOR PI UNDERGLOVE SZ 6.5 BLUE

## (undated) DEVICE — DRESSING MEPILEX AG BORDER 4 X 4 IN

## (undated) DEVICE — 3M™ STERI-DRAPE™ U-DRAPE 1015: Brand: STERI-DRAPE™

## (undated) DEVICE — TUBING AUX CHANNEL

## (undated) DEVICE — COBAN 4 IN STERILE

## (undated) RX ORDER — BIMATOPROST 0.1 MG/ML: 1 SOLUTION/ DROPS OPHTHALMIC EVERY EVENING